# Patient Record
Sex: FEMALE | Race: WHITE | NOT HISPANIC OR LATINO | Employment: FULL TIME | URBAN - METROPOLITAN AREA
[De-identification: names, ages, dates, MRNs, and addresses within clinical notes are randomized per-mention and may not be internally consistent; named-entity substitution may affect disease eponyms.]

---

## 2017-02-15 ENCOUNTER — OFFICE VISIT (OUTPATIENT)
Dept: FAMILY MEDICINE | Facility: CLINIC | Age: 42
End: 2017-02-15
Payer: COMMERCIAL

## 2017-02-15 VITALS
WEIGHT: 149 LBS | HEIGHT: 67 IN | OXYGEN SATURATION: 98 % | TEMPERATURE: 97.4 F | SYSTOLIC BLOOD PRESSURE: 142 MMHG | DIASTOLIC BLOOD PRESSURE: 92 MMHG | BODY MASS INDEX: 23.39 KG/M2

## 2017-02-15 DIAGNOSIS — J32.9 OTHER SINUSITIS: Primary | ICD-10-CM

## 2017-02-15 DIAGNOSIS — J30.1 ALLERGIC RHINITIS DUE TO POLLEN, UNSPECIFIED RHINITIS SEASONALITY: ICD-10-CM

## 2017-02-15 PROCEDURE — 99213 OFFICE O/P EST LOW 20 MIN: CPT | Performed by: NURSE PRACTITIONER

## 2017-02-15 RX ORDER — CEFDINIR 300 MG/1
600 CAPSULE ORAL DAILY
Qty: 14 CAPSULE | Refills: 0 | Status: SHIPPED | OUTPATIENT
Start: 2017-02-15 | End: 2017-02-22

## 2017-02-15 NOTE — PROGRESS NOTES
"  SUBJECTIVE:                                                    Olesya Cintron is a 41 year old female who presents to clinic today for the following health issues:      ENT Symptoms             Symptoms: cc Present Absent Comment   Fever/Chills   x    Fatigue  x     Muscle Aches   x    Eye Irritation   x    Sneezing   x    Nasal Amandeep/Drg  x     Sinus Pressure/Pain  x     Loss of smell  x     Dental pain  x     Sore Throat   x    Swollen Glands  x     Ear Pain/Fullness   x    Cough   x    Wheeze   x    Chest Pain   x    Shortness of breath   x    Rash   x    Other   x      Symptom duration:  two weeks   Symptom severity:  moderate   Treatments tried:  OTC   Contacts:  none     History of asthma/allergies- controlled    -------------------------------------    Problem list and histories reviewed & adjusted, as indicated.  Additional history: as documented    Problem list, Medication list, Allergies, and Medical/Social/Surgical histories reviewed in EPIC and updated as appropriate.    ROS:  Constitutional, HEENT, cardiovascular, pulmonary, GI, , musculoskeletal, neuro, skin, endocrine and psych systems are negative, except as otherwise noted.    OBJECTIVE:                                                    BP (!) 142/92 (BP Location: Left arm, Patient Position: Left side, Cuff Size: Adult Regular)  Temp 97.4  F (36.3  C) (Tympanic)  Ht 5' 6.75\" (1.695 m)  Wt 149 lb (67.6 kg)  SpO2 98%  BMI 23.51 kg/m2  Body mass index is 23.51 kg/(m^2).  GENERAL: healthy, alert and no distress  HENT: normal cephalic/atraumatic, ear canals and TM's normal, nose and mouth without ulcers or lesions, oropharynx clear, oral mucous membranes moist and sinuses: maxillary, frontal, ethmoid tenderness on bilaterally  NECK: no adenopathy, no asymmetry, masses, or scars and thyroid normal to palpation  RESP: lungs clear to auscultation - no rales, rhonchi or wheezes  CV: regular rate and rhythm, normal S1 S2, no S3 or S4, no murmur, click or " rub, no peripheral edema and peripheral pulses strong  MS: no gross musculoskeletal defects noted, no edema    Diagnostic Test Results:  none      ASSESSMENT/PLAN:                                                      1. Other sinusitis    - cefdinir (OMNICEF) 300 MG capsule; Take 2 capsules (600 mg) by mouth daily for 7 days  Dispense: 14 capsule; Refill: 0  Continue to use Neti Pot-  Consider starting flonase.     2. Allergic rhinitis due to pollen, unspecified rhinitis seasonality          FANI Loera [facility]

## 2017-02-15 NOTE — NURSING NOTE
"Initial BP (!) 142/92 (BP Location: Left arm, Patient Position: Left side, Cuff Size: Adult Regular)  Temp 97.4  F (36.3  C) (Tympanic)  Ht 5' 6.75\" (1.695 m)  Wt 149 lb (67.6 kg)  SpO2 98%  BMI 23.51 kg/m2 Estimated body mass index is 23.51 kg/(m^2) as calculated from the following:    Height as of this encounter: 5' 6.75\" (1.695 m).    Weight as of this encounter: 149 lb (67.6 kg). .    Amada Rolle    "

## 2017-02-15 NOTE — PATIENT INSTRUCTIONS
1. Take antibiotics once a day for 7 days  Sinusitis (Antibiotic Treatment)    The sinuses are air-filled spaces within the bones of the face. They connect to the inside of the nose. Sinusitis is an inflammation of the tissue lining the sinus cavity. Sinus inflammation can occur during a cold. It can also be due to allergies to pollens and other particles in the air. Sinusitis can cause symptoms of sinus congestion and fullness. A sinus infection causes fever, headache and facial pain. There is often green or yellow drainage from the nose or into the back of the throat (post-nasal drip). You have been given antibiotics to treat this condition.  Home care:    Take the full course of antibiotics as instructed. Do not stop taking them, even if you feel better.    Drink plenty of water, hot tea, and other liquids. This may help thin mucus. It also may promote sinus drainage.    Heat may help soothe painful areas of the face. Use a towel soaked in hot water. Or,  the shower and direct the hot spray onto your face. Using a vaporizer along with a menthol rub at night may also help.     An expectorant containing guaifenesin may help thin the mucus and promote drainage from the sinuses.    Over-the-counter decongestants may be used unless a similar medicine was prescribed. Nasal sprays work the fastest. Use one that contains phenylephrine or oxymetazoline. First blow the nose gently. Then use the spray. Do not use these medicines more often than directed on the label or symptoms may get worse. You may also use tablets containing pseudoephedrine. Avoid products that combine ingredients, because side effects may be increased. Read labels. You can also ask the pharmacist for help. (NOTE: Persons with high blood pressure should not use decongestants. They can raise blood pressure.)    Over-the-counter antihistamines may help if allergies contributed to your sinusitis.      Do not use nasal rinses or irrigation during an  acute sinus infection, unless told to by your health care provider. Rinsing may spread the infection to other sinuses.    Use acetaminophen or ibuprofen to control pain, unless another pain medicine was prescribed. (If you have chronic liver or kidney disease or ever had a stomach ulcer, talk with your doctor before using these medicines. Aspirin should never be used in anyone under 18 years of age who is ill with a fever. It may cause severe liver damage.)    Don't smoke. This can worsen symptoms.  Follow-up care  Follow up with your healthcare provider or our staff if you are not improving within the next week.  When to seek medical advice  Call your healthcare provider if any of these occur:    Facial pain or headache becoming more severe    Stiff neck    Unusual drowsiness or confusion    Swelling of the forehead or eyelids    Vision problems, including blurred or double vision    Fever of 100.4 F (38 C) or higher, or as directed by your healthcare provider    Seizure    Breathing problems    Symptoms not resolving within 10 days    [id number] [employer] [address] [place] [address], [place] [address]. All rights reserved. This information is not intended as a substitute for professional medical care. Always follow your healthcare professional's instructions.

## 2017-02-15 NOTE — MR AVS SNAPSHOT
After Visit Summary   2/15/2017    Olesya Cintron    MRN: [id number]           Patient Information     Date Of Birth          1975        Visit Information        Provider Department      2/15/2017 11:40 AM Randa Boles APRN [facility]        Today's Diagnoses     Other sinusitis    -  1      Care Instructions    1. Take antibiotics once a day for 7 days  Sinusitis (Antibiotic Treatment)    The sinuses are air-filled spaces within the bones of the face. They connect to the inside of the nose. Sinusitis is an inflammation of the tissue lining the sinus cavity. Sinus inflammation can occur during a cold. It can also be due to allergies to pollens and other particles in the air. Sinusitis can cause symptoms of sinus congestion and fullness. A sinus infection causes fever, headache and facial pain. There is often green or yellow drainage from the nose or into the back of the throat (post-nasal drip). You have been given antibiotics to treat this condition.  Home care:    Take the full course of antibiotics as instructed. Do not stop taking them, even if you feel better.    Drink plenty of water, hot tea, and other liquids. This may help thin mucus. It also may promote sinus drainage.    Heat may help soothe painful areas of the face. Use a towel soaked in hot water. Or,  the shower and direct the hot spray onto your face. Using a vaporizer along with a menthol rub at night may also help.     An expectorant containing guaifenesin may help thin the mucus and promote drainage from the sinuses.    Over-the-counter decongestants may be used unless a similar medicine was prescribed. Nasal sprays work the fastest. Use one that contains phenylephrine or oxymetazoline. First blow the nose gently. Then use the spray. Do not use these medicines more often than directed on the label or symptoms may get worse. You may also use tablets containing pseudoephedrine. Avoid products that  combine ingredients, because side effects may be increased. Read labels. You can also ask the pharmacist for help. (NOTE: Persons with high blood pressure should not use decongestants. They can raise blood pressure.)    Over-the-counter antihistamines may help if allergies contributed to your sinusitis.      Do not use nasal rinses or irrigation during an acute sinus infection, unless told to by your health care provider. Rinsing may spread the infection to other sinuses.    Use acetaminophen or ibuprofen to control pain, unless another pain medicine was prescribed. (If you have chronic liver or kidney disease or ever had a stomach ulcer, talk with your doctor before using these medicines. Aspirin should never be used in anyone under 18 years of age who is ill with a fever. It may cause severe liver damage.)    Don't smoke. This can worsen symptoms.  Follow-up care  Follow up with your healthcare provider or our staff if you are not improving within the next week.  When to seek medical advice  Call your healthcare provider if any of these occur:    Facial pain or headache becoming more severe    Stiff neck    Unusual drowsiness or confusion    Swelling of the forehead or eyelids    Vision problems, including blurred or double vision    Fever of 100.4 F (38 C) or higher, or as directed by your healthcare provider    Seizure    Breathing problems    Symptoms not resolving within 10 days    [id number] [employer]. [address] [place] [address], [place] [address]. All rights reserved. This information is not intended as a substitute for professional medical care. Always follow your healthcare professional's instructions.              Follow-ups after your visit        Who to contact     If you have questions or need follow up information about today's clinic visit or your schedule please contact [facility] directly at [phone].  Normal or non-critical lab and imaging results will be communicated  "to you by Foodcloudhart, letter or phone within 4 business days after the clinic has received the results. If you do not hear from us within 7 days, please contact the clinic through Des[employer]plic[employer] or phone. If you have a critical or abnormal lab result, we will notify you by phone as soon as possible.  Submit refill requests through [employer] or call your pharmacy and they will forward the refill request to us. Please allow 3 business days for your refill to be completed.          Additional Information About Your Visit        [employer]a Information     [employer] gives you secure access to your electronic health record. If you see a primary care provider, you can also send messages to your care team and make appointments. If you have questions, please call your primary care clinic.  If you do not have a primary care provider, please call [phone] and they will assist you.        Care EveryWhere ID     This is your Care EveryWhere ID. This could be used by other organizations to access your [place] medical records  [id number]        Your Vitals Were     Temperature Height Pulse Oximetry BMI (Body Mass Index)          97.4  F (36.3  C) (Tympanic) 5' 6.75\" (1.695 m) 98% 23.51 kg/m2         Blood Pressure from Last 3 Encounters:   02/15/17 (!) 142/92   06/16/16 121/72   03/29/16 128/75    Weight from Last 3 Encounters:   02/15/17 149 lb (67.6 kg)   06/16/16 145 lb (65.8 kg)   03/29/16 141 lb 9.6 oz (64.2 kg)              Today, you had the following     No orders found for display         Today's Medication Changes          These changes are accurate as of: 2/15/17 12:03 PM.  If you have any questions, ask your nurse or doctor.               Start taking these medicines.        Dose/Directions    cefdinir 300 MG capsule   Commonly known as:  OMNICEF   Used for:  Other sinusitis        Dose:  600 mg   Take 2 capsules (600 mg) by mouth daily for 7 days   Quantity:  14 capsule   Refills:  0            Where to get your medicines    "   These medications were sent to [facility] [place], [place] - [address]  [address] [address]     Phone:  [phone]     cefdinir 300 MG capsule                Primary Care Provider Office Phone # Fax #    Bere HarrisTRAMAINE [phone]       [facility] [address] [facility] [address]        Thank you!     Thank you for choosing [facility]  for your care. Our goal is always to provide you with excellent care. Hearing back from our patients is one way we can continue to improve our services. Please take a few minutes to complete the written survey that you may receive in the mail after your visit with us. Thank you!             Your Updated Medication List - Protect others around you: Learn how to safely use, store and throw away your medicines at www.disposemymeds.org.          This list is accurate as of: 2/15/17 12:03 PM.  Always use your most recent med list.                   Brand Name Dispense Instructions for use    * albuterol (2.5 MG/3ML) 0.083% neb solution     30 vial    Take 1 vial (2.5 mg) by nebulization every 6 hours as needed for shortness of breath / dyspnea or wheezing       * albuterol 108 (90 BASE) MCG/ACT Inhaler    PROAIR HFA/PROVENTIL HFA/VENTOLIN HFA    1 Inhaler    Inhale 2 puffs into the lungs every 6 hours as needed for shortness of breath / dyspnea or wheezing       cefdinir 300 MG capsule    OMNICEF    14 capsule    Take 2 capsules (600 mg) by mouth daily for 7 days       * fluticasone 50 MCG/ACT spray    FLONASE    1 g    Spray 1-2 sprays into both nostrils daily       * fluticasone 50 MCG/ACT spray    FLONASE    16 g    Spray 1-2 sprays into both nostrils daily as needed for rhinitis or allergies       ibuprofen 200 MG tablet    ADVIL/MOTRIN     Take 200 mg by mouth every 4 hours as needed for mild pain       methylphenidate 20 MG tablet    RITALIN     Take 20 mg by mouth 2 times daily        norgestimate-ethinyl estradiol 0.25-35 MG-MCG per tablet    ORTHO-CYCLEN, SPRINTEC    84 tablet    Take 1 tablet by mouth daily       order for DME     1 Units    Nebulizer       traZODone 25 MG Tabs half-tab    DESYREL     Take 25 mg by mouth At Bedtime       * Notice:  This list has 4 medication(s) that are the same as other medications prescribed for you. Read the directions carefully, and ask your doctor or other care provider to review them with you.

## 2017-03-06 ENCOUNTER — MYC MEDICAL ADVICE (OUTPATIENT)
Dept: FAMILY MEDICINE | Facility: CLINIC | Age: 42
End: 2017-03-06

## 2017-03-06 ENCOUNTER — TELEPHONE (OUTPATIENT)
Dept: FAMILY MEDICINE | Facility: CLINIC | Age: 42
End: 2017-03-06

## 2017-03-06 ENCOUNTER — HOSPITAL ENCOUNTER (EMERGENCY)
Facility: CLINIC | Age: 42
Discharge: HOME | End: 2017-03-06
Attending: NURSE PRACTITIONER | Admitting: NURSE PRACTITIONER
Payer: COMMERCIAL

## 2017-03-06 VITALS
SYSTOLIC BLOOD PRESSURE: 138 MMHG | BODY MASS INDEX: 22.09 KG/M2 | TEMPERATURE: 98.2 F | DIASTOLIC BLOOD PRESSURE: 87 MMHG | WEIGHT: 140 LBS | HEART RATE: 76 BPM | OXYGEN SATURATION: 98 % | RESPIRATION RATE: 16 BRPM

## 2017-03-06 DIAGNOSIS — J06.9 VIRAL URI: ICD-10-CM

## 2017-03-06 PROCEDURE — 99212 OFFICE O/P EST SF 10 MIN: CPT

## 2017-03-06 PROCEDURE — 99212 OFFICE O/P EST SF 10 MIN: CPT | Performed by: NURSE PRACTITIONER

## 2017-03-06 ASSESSMENT — ENCOUNTER SYMPTOMS
RHINORRHEA: 1
MUSCULOSKELETAL NEGATIVE: 1
RESPIRATORY NEGATIVE: 1
GASTROINTESTINAL NEGATIVE: 1
NEUROLOGICAL NEGATIVE: 1
CONSTITUTIONAL NEGATIVE: 1
SORE THROAT: 1
SINUS PRESSURE: 1
EYES NEGATIVE: 1
CARDIOVASCULAR NEGATIVE: 1

## 2017-03-06 NOTE — TELEPHONE ENCOUNTER
Reason for Call:  Other med request    Detailed comments: patient states she was seen 2/15/17 for a sinus infection and given antibiotics but it feels like the infection is back and she wants more medication.  [facility].    Phone Number Patient can be reached at: Home number on file [phone] (home)    Best Time: any    Can we leave a detailed message on this number? YES    Call taken on 3/6/2017 at 2:01 PM by Charlene Pedraza

## 2017-03-06 NOTE — TELEPHONE ENCOUNTER
Left detailed message , per her ok that we do so, that antibiotics are not usually reordered without a visit/ being seen, or evisit or zipnosis.   I sent her a mychart note also with the dot phrase for evisit sinus.     Deanna Chavez RNC

## 2017-03-06 NOTE — ED AVS SNAPSHOT
[facility]    [address] [address]    Phone:  [phone]    Fax:  [phone]                                       Olesya Cintron   MRN: [id number]    Department:  [facility]   Date of Visit:  3/6/2017           Patient Information     Date Of Birth          1975        Your diagnoses for this visit were:     Viral URI        You were seen by Candido Patel, FANI CNP.      Follow-up Information     Follow up with Bere Harris NP.    Specialty:  Nurse Practitioner - Family    Why:  As needed, If symptoms worsen    Contact information:    [facility]  [address] [address]  [phone]          Discharge Instructions         Viral Upper Respiratory Illness (Adult)  You have a viral upper respiratory illness (URI), which is another term for the common cold. This illness is contagious during the first few days. It is spread through the air by coughing and sneezing. It may also be spread by direct contact (touching the sick person and then touching your own eyes, nose, or mouth). Frequent handwashing will decrease risk of spread. Most viral illnesses go away within 7 to 10 days with rest and simple home remedies. Sometimes the illness may last for several weeks. Antibiotics will not kill a virus, and they are generally not prescribed for this condition.    Home care    If symptoms are severe, rest at home for the first 2 to 3 days. When you resume activity, don't let yourself get too tired.    Avoid being exposed to cigarette smoke (yours or others ).    You may use acetaminophen or ibuprofen to control pain and fever, unless another medicine was prescribed. (Note: If you have chronic liver or kidney disease, have ever had a stomach ulcer or gastrointestinal bleeding, or are taking blood-thinning medicines, talk with your healthcare provider before using these medicines.) Aspirin should never  be given to anyone under 18 years of age who is ill with a viral infection or fever. It may cause severe liver or brain damage.    Your appetite may be poor, so a light diet is fine. Avoid dehydration by drinking 6 to 8 glasses of fluids per day (water, soft drinks, juices, tea, or soup). Extra fluids will help loosen secretions in the nose and lungs.    Over-the-counter cold medicines will not shorten the length of time you re sick, but they may be helpful for the following symptoms: cough, sore throat, and nasal and sinus congestion. (Note: Do not use decongestants if you have high blood pressure.)  Follow-up care  Follow up with your healthcare provider, or as advised.  When to seek medical advice  Call your healthcare provider right away if any of these occur:    Cough with lots of colored sputum (mucus)    Severe headache; face, neck, or ear pain    Difficulty swallowing due to throat pain    Fever of 100.4 F (38 C)  Call 911, or get immediate medical care  Call emergency services right away if any of these occur:    Chest pain, shortness of breath, wheezing, or difficulty breathing    Coughing up blood    Inability to swallow due to throat pain    9763-8457 [employer]. [address] [place] [address]. All rights reserved. This information is not intended as a substitute for professional medical care. Always follow your healthcare professional's instructions.          24 Hour Appointment Hotline       To make an appointment at any [facility], call [phone] ([phone]). If you don't have a family doctor or clinic, we will help you find one. [facility] clinics are conveniently located to serve the needs of you and your family.             Review of your medicines      Our records show that you are taking the medicines listed below. If these are incorrect, please call your family doctor or clinic.        Dose / Directions Last dose taken    * albuterol (2.5 MG/3ML) 0.083% neb  solution   Dose:  1 vial   Quantity:  30 vial        Take 1 vial (2.5 mg) by nebulization every 6 hours as needed for shortness of breath / dyspnea or wheezing   Refills:  1        * albuterol 108 (90 BASE) MCG/ACT Inhaler   Commonly known as:  PROAIR HFA/PROVENTIL HFA/VENTOLIN HFA   Dose:  2 puff   Quantity:  1 Inhaler        Inhale 2 puffs into the lungs every 6 hours as needed for shortness of breath / dyspnea or wheezing   Refills:  0        * fluticasone 50 MCG/ACT spray   Commonly known as:  FLONASE   Dose:  1-2 spray   Quantity:  1 g        Spray 1-2 sprays into both nostrils daily   Refills:  0        * fluticasone 50 MCG/ACT spray   Commonly known as:  FLONASE   Dose:  1-2 spray   Quantity:  16 g        Spray 1-2 sprays into both nostrils daily as needed for rhinitis or allergies   Refills:  1        ibuprofen 200 MG tablet   Commonly known as:  ADVIL/MOTRIN   Dose:  200 mg        Take 200 mg by mouth every 4 hours as needed for mild pain   Refills:  0        methylphenidate 20 MG tablet   Commonly known as:  RITALIN   Dose:  20 mg        Take 20 mg by mouth 2 times daily   Refills:  0        norgestimate-ethinyl estradiol 0.25-35 MG-MCG per tablet   Commonly known as:  ORTHO-CYCLEN, SPRINTEC   Dose:  1 tablet   Quantity:  84 tablet        Take 1 tablet by mouth daily   Refills:  3        order for DME   Quantity:  1 Units        Nebulizer   Refills:  0        traZODone 25 MG Tabs half-tab   Commonly known as:  DESYREL   Dose:  25 mg        Take 25 mg by mouth At Bedtime   Refills:  0        * Notice:  This list has 4 medication(s) that are the same as other medications prescribed for you. Read the directions carefully, and ask your doctor or other care provider to review them with you.            Orders Needing Specimen Collection     None      Pending Results     No orders found from 3/4/2017 to 3/7/2017.            Pending Culture Results     No orders found from 3/4/2017 to 3/7/2017.             Test  Results from your hospital stay            Thank you for choosing [facility]       Thank you for choosing [facility] for your care. Our goal is always to provide you with excellent care. Hearing back from our patients is one way we can continue to improve our services. Please take a few minutes to complete the written survey that you may receive in the mail after you visit with us. Thank you!        [employer] Powerhart Information     BuzzMob gives you secure access to your electronic health record. If you see a primary care provider, you can also send messages to your care team and make appointments. If you have questions, please call your primary care clinic.  If you do not have a primary care provider, please call [phone] and they will assist you.        Care EveryWhere ID     This is your Care EveryWhere ID. This could be used by other organizations to access your [facility] medical records  [id number]        After Visit Summary       This is your record. Keep this with you and show to your community pharmacist(s) and doctor(s) at your next visit.

## 2017-03-06 NOTE — ED AVS SNAPSHOT
[facility]    [address] [address]    Phone:  [phone]    Fax:  [phone]                                       Olesya Cintron   MRN: [id number]    Department:  [facility]   Date of Visit:  3/6/2017           After Visit Summary Signature Page     I have received my discharge instructions, and my questions have been answered. I have discussed any challenges I see with this plan with the nurse or doctor.    ..........................................................................................................................................  Patient/Patient Representative Signature      ..........................................................................................................................................  Patient Representative Print Name and Relationship to Patient    ..................................................               ................................................  Date                                            Time    ..........................................................................................................................................  Reviewed by Signature/Title    ...................................................              ..............................................  Date                                                            Time

## 2017-03-07 NOTE — ED PROVIDER NOTES
History     Chief Complaint   Patient presents with     Sinusitis     seen on 2/15 for sinus infection. Once abx wore off, st, tooth pain, achy.      HPI  Olesya Cintron is a 41 year old female who has had sinus pain for 2 days without fever. Also has nasal congestion and ST. No rash. She had a sinus infection 3 weeks ago and was treated with antibiotics and improved. She takes flonase for chronic rhinitis.     I have reviewed the Medications, Allergies, Past Medical and Surgical History, and Social History in the Epic system.    Review of Systems   Constitutional: Negative.    HENT: Positive for congestion, postnasal drip, rhinorrhea, sinus pressure and sore throat.    Eyes: Negative.    Respiratory: Negative.    Cardiovascular: Negative.    Gastrointestinal: Negative.    Genitourinary: Negative.    Musculoskeletal: Negative.    Skin: Negative.    Neurological: Negative.        Physical Exam   BP: 138/87  Pulse: 76  Temp: 98.2  F (36.8  C)  Resp: 16  Weight: 63.5 kg (140 lb)  SpO2: 98 %  Physical Exam   Constitutional: She is oriented to person, place, and time. She appears well-developed and well-nourished. No distress.   HENT:   Right Ear: External ear normal.   Left Ear: External ear normal.   Mouth/Throat: No oropharyngeal exudate (PND).   Mild Sinus tenderness   Eyes: Conjunctivae and EOM are normal. Right eye exhibits no discharge. Left eye exhibits no discharge.   Neck: Neck supple.   Cardiovascular: Normal rate and normal heart sounds.    No murmur heard.  Pulmonary/Chest: Effort normal and breath sounds normal. No respiratory distress. She has no wheezes. She has no rales. She exhibits no tenderness.   Lymphadenopathy:     She has cervical adenopathy.   Neurological: She is alert and oriented to person, place, and time. No cranial nerve deficit.   Skin: Skin is warm. No rash noted.       ED Course     ED Course     Procedures           Labs Ordered and Resulted from Time of ED Arrival Up to the Time of  Departure from the ED - No data to display    Assessments & Plan (with Medical Decision Making)   Viral uri    I have reviewed the nursing notes.    I have reviewed the findings, diagnosis, plan and need for follow up with the patient.    Discharge Medication List as of 3/6/2017  8:03 PM          Final diagnoses:   Viral URI       3/6/2017   [facility] EMERGENCY DEPARTMENT     Candido Patel APRN CNP  03/06/17 2009

## 2017-03-07 NOTE — DISCHARGE INSTRUCTIONS

## 2017-05-05 DIAGNOSIS — Z30.41 ENCOUNTER FOR SURVEILLANCE OF CONTRACEPTIVE PILLS: ICD-10-CM

## 2017-05-08 RX ORDER — NORGESTIMATE AND ETHINYL ESTRADIOL 0.25-0.035
KIT ORAL
Qty: 84 TABLET | Refills: 3 | Status: SHIPPED | OUTPATIENT
Start: 2017-05-08 | End: 2018-03-17

## 2017-05-08 NOTE — TELEPHONE ENCOUNTER
Mono-Linyan     Last Written Prescription Date: 03/29/16  Last Fill Quantity: 84,  # refills: 3   Last Office Visit with [facility], [facility] or [facility] prescribing provider: 02/15/17                                         Next 5 appointments (look out 90 days)     May 12, 2017  2:20 PM CDT   SHORT with Saman Singh MD   [facility] ([facility])    [address] [address] [phone]

## 2017-05-12 ENCOUNTER — OFFICE VISIT (OUTPATIENT)
Dept: FAMILY MEDICINE | Facility: CLINIC | Age: 42
End: 2017-05-12
Payer: COMMERCIAL

## 2017-05-12 VITALS
WEIGHT: 147.8 LBS | TEMPERATURE: 98.7 F | BODY MASS INDEX: 23.2 KG/M2 | HEIGHT: 67 IN | HEART RATE: 81 BPM | OXYGEN SATURATION: 99 % | SYSTOLIC BLOOD PRESSURE: 110 MMHG | DIASTOLIC BLOOD PRESSURE: 76 MMHG

## 2017-05-12 DIAGNOSIS — N91.2 ABSENCE OF MENSTRUATION: Primary | ICD-10-CM

## 2017-05-12 DIAGNOSIS — R10.31 ABDOMINAL PAIN, RIGHT LOWER QUADRANT: ICD-10-CM

## 2017-05-12 LAB
BETA HCG QUAL IFA URINE: NEGATIVE
FSH SERPL-ACNC: 7.4 IU/L
T4 FREE SERPL-MCNC: 0.88 NG/DL (ref 0.76–1.46)
TSH SERPL DL<=0.05 MIU/L-ACNC: 1.1 MU/L (ref 0.4–4)

## 2017-05-12 PROCEDURE — 36415 COLL VENOUS BLD VENIPUNCTURE: CPT | Performed by: FAMILY MEDICINE

## 2017-05-12 PROCEDURE — 84703 CHORIONIC GONADOTROPIN ASSAY: CPT | Performed by: FAMILY MEDICINE

## 2017-05-12 PROCEDURE — 99213 OFFICE O/P EST LOW 20 MIN: CPT | Performed by: FAMILY MEDICINE

## 2017-05-12 PROCEDURE — 84439 ASSAY OF FREE THYROXINE: CPT | Performed by: FAMILY MEDICINE

## 2017-05-12 PROCEDURE — 83001 ASSAY OF GONADOTROPIN (FSH): CPT | Performed by: FAMILY MEDICINE

## 2017-05-12 PROCEDURE — 84443 ASSAY THYROID STIM HORMONE: CPT | Performed by: FAMILY MEDICINE

## 2017-05-12 RX ORDER — MULTIPLE VITAMINS W/ MINERALS TAB 9MG-400MCG
1 TAB ORAL DAILY
COMMUNITY
End: 2021-01-28

## 2017-05-12 NOTE — PROGRESS NOTES
"  SUBJECTIVE:                                                    Olesya Cintron is a 41 year old female who presents to clinic today for the following health issues:      Concern - amenorrhea     Onset: 2 weeks    Description:   Patient is normally very regular with periods, has not had for last 2 weeks as was scheduled but did start 2 days ago - very light, patient has had ovarian cysts in the past and is wondering if this may be happening again, some pain (currently 4/10), also wondering if may be early onset of menopause     Intensity: mild    Progression of Symptoms:  same    Accompanying Signs & Symptoms:  nausea       Previous history of similar problem:   NA    Precipitating factors:   Worsened by: NA    Alleviating factors:  Improved by: NA       Therapies Tried and outcome: has taken a couple of home pregnancy tests coming back negative      Problem list and histories reviewed & adjusted, as indicated.  Additional history: as documented    Reviewed and updated as needed this visit by clinical staff  Tobacco  Allergies  Med Hx  Surg Hx  Fam Hx  Soc Hx      Reviewed and updated as needed this visit by Provider         ROS:  C: NEGATIVE for fever, chills, change in weight  E/M: NEGATIVE for ear, mouth and throat problems  R: NEGATIVE for significant cough or SOB  CV: NEGATIVE for chest pain, palpitations or peripheral edema  ENDOCRINE: NEGATIVE for temperature intolerance, skin/hair changes    OBJECTIVE:                                                    /76  Pulse 81  Temp 98.7  F (37.1  C) (Tympanic)  Ht 5' 6.75\" (1.695 m)  Wt 147 lb 12.8 oz (67 kg)  LMP 05/10/2017 (Exact Date)  SpO2 99%  BMI 23.32 kg/m2  Body mass index is 23.32 kg/(m^2).  GENERAL: healthy, alert and no distress  ABDOMEN: soft, Right lower quadrant tenderness mild no hepatosplenomegaly, no masses and bowel sounds normal  BACK: no CVA tenderness, no paralumbar tenderness    Diagnostic Test Results:  Results for orders " placed or performed in visit on 05/12/17 (from the past 24 hour(s))   Beta HCG qual IFA urine   Result Value Ref Range    Beta HCG Qual IFA Urine Negative NEG        ASSESSMENT/PLAN:                                                        Olesya was seen today for menopausal sx.    Diagnoses and all orders for this visit:    Absence of menstruation: rule out menopause with FSH and then decide whether to start OCPs again or not  -     Beta HCG qual IFA urine  -     TSH  -     T4 FREE  -     Follicle stimulating hormone    Abdominal pain, right lower quadrant:   -     US Pelvic Complete w Transvaginal; Future        Patient Instructions     To lab for blood work    Call to schedule the ultrasound        Saman Singh MD  [facility]

## 2017-05-12 NOTE — PATIENT INSTRUCTIONS
To lab for blood work    Call to schedule the ultrasound    Thank you for choosing [facility].  You may be receiving a survey in the mail from [employer] regarding your visit today.  Please take a few minutes to complete and return the survey to let us know how we are doing.      If you have questions or concerns, please contact us via SmashChart or you can contact your care team at [phone].    Our Clinic hours are:  Monday 6:40 am  to 7:00 pm  Tuesday -Friday 6:40 am to 5:00 pm    The [place] outpatient lab hours are:  Monday - Friday 6:10 am to 4:45 pm  Saturdays 7:00 am to 11:00 am  Appointments are required, call [phone]    If you have clinical questions after hours or would like to schedule an appointment,  call the clinic at [phone].

## 2017-05-12 NOTE — MR AVS SNAPSHOT
After Visit Summary   5/12/2017    Olesya Cintron    MRN: [id number]           Patient Information     Date Of Birth          1975        Visit Information        Provider Department      5/12/2017 2:20 PM Saman Singh MD [facility]        Today's Diagnoses     Absence of menstruation    -  1    Abdominal pain, right lower quadrant          Care Instructions      To lab for blood work    Call to schedule the ultrasound    Thank you for choosing [facility].  You may be receiving a survey in the mail from [employer] regarding your visit today.  Please take a few minutes to complete and return the survey to let us know how we are doing.      If you have questions or concerns, please contact us via [employer] or you can contact your care team at [phone].    Our Clinic hours are:  Monday 6:40 am  to 7:00 pm  Tuesday -Friday 6:40 am to 5:00 pm    The [place] outpatient lab hours are:  Monday - Friday 6:10 am to 4:45 pm  Saturdays 7:00 am to 11:00 am  Appointments are required, call [phone]    If you have clinical questions after hours or would like to schedule an appointment,  call the clinic at [phone].        Follow-ups after your visit        Future tests that were ordered for you today     Open Future Orders        Priority Expected Expires Ordered    US Pelvic Complete w Transvaginal Routine 8/10/2017 5/12/2018 5/12/2017            Who to contact     If you have questions or need follow up information about today's clinic visit or your schedule please contact [facility] directly at [phone].  Normal or non-critical lab and imaging results will be communicated to you by MyChart, letter or phone within 4 business days after the clinic has received the results. If you do not hear from us within 7 days, please contact the clinic through [employer] or phone. If you have a critical or abnormal lab result, we will notify you by phone as soon as  "possible.  Submit refill requests through [employer] or call your pharmacy and they will forward the refill request to us. Please allow 3 business days for your refill to be completed.          Additional Information About Your Visit        [employer] Information     [employer] gives you secure access to your electronic health record. If you see a primary care provider, you can also send messages to your care team and make appointments. If you have questions, please call your primary care clinic.  If you do not have a primary care provider, please call [phone] and they will assist you.        Care EveryWhere ID     This is your Care EveryWhere ID. This could be used by other organizations to access your [place] medical records  [id number]        Your Vitals Were     Pulse Temperature Height Last Period Pulse Oximetry BMI (Body Mass Index)    81 98.7  F (37.1  C) (Tympanic) 5' 6.75\" (1.695 m) 05/10/2017 (Exact Date) 99% 23.32 kg/m2       Blood Pressure from Last 3 Encounters:   05/12/17 110/76   03/06/17 138/87   02/15/17 (!) 142/92    Weight from Last 3 Encounters:   05/12/17 147 lb 12.8 oz (67 kg)   03/06/17 140 lb (63.5 kg)   02/15/17 149 lb (67.6 kg)              We Performed the Following     Beta HCG qual IFA urine     Follicle stimulating hormone     T4 FREE     TSH        Primary Care Provider Office Phone # Fax #    Bere Harris [phone]       [facility] [address] [address]        Thank you!     Thank you for choosing [facility]  for your care. Our goal is always to provide you with excellent care. Hearing back from our patients is one way we can continue to improve our services. Please take a few minutes to complete the written survey that you may receive in the mail after your visit with us. Thank you!             Your Updated Medication List - Protect others around you: Learn how to safely use, store and throw away your medicines at " www.disposemymeds.org.          This list is accurate as of: 5/12/17  3:39 PM.  Always use your most recent med list.                   Brand Name Dispense Instructions for use    * albuterol (2.5 MG/3ML) 0.083% neb solution     30 vial    Take 1 vial (2.5 mg) by nebulization every 6 hours as needed for shortness of breath / dyspnea or wheezing       * albuterol 108 (90 BASE) MCG/ACT Inhaler    PROAIR HFA/PROVENTIL HFA/VENTOLIN HFA    1 Inhaler    Inhale 2 puffs into the lungs every 6 hours as needed for shortness of breath / dyspnea or wheezing       * fluticasone 50 MCG/ACT spray    FLONASE    1 g    Spray 1-2 sprays into both nostrils daily       * fluticasone 50 MCG/ACT spray    FLONASE    16 g    Spray 1-2 sprays into both nostrils daily as needed for rhinitis or allergies       ibuprofen 200 MG tablet    ADVIL/MOTRIN     Take 200 mg by mouth every 4 hours as needed for mild pain       methylphenidate 20 MG tablet    RITALIN     Take 38 mg by mouth 2 times daily Time released       MONO-LINYAH 0.25-35 MG-MCG per tablet   Generic drug:  norgestimate-ethinyl estradiol     84 tablet    TAKE ONE TABLET BY MOUTH EVERY DAY       Multi-vitamin Tabs tablet      Take 1 tablet by mouth daily       order for DME     1 Units    Nebulizer       traZODone 25 mg Tabs half-tab    DESYREL     Take 25 mg by mouth At Bedtime       VITAMIN D (CHOLECALCIFEROL) PO      Take by mouth daily       * Notice:  This list has 4 medication(s) that are the same as other medications prescribed for you. Read the directions carefully, and ask your doctor or other care provider to review them with you.

## 2017-05-12 NOTE — NURSING NOTE
"Chief Complaint   Patient presents with     Menopausal Sx     patient states has been about 2 weeks late on her period - normally very timely each month; wants to be checked for pregnancy or possible hormonal changes (menopause)       Initial /76  Pulse 81  Temp 98.7  F (37.1  C) (Tympanic)  Ht 5' 6.75\" (1.695 m)  Wt 147 lb 12.8 oz (67 kg)  LMP 05/10/2017 (Exact Date)  SpO2 99%  BMI 23.32 kg/m2 Estimated body mass index is 23.32 kg/(m^2) as calculated from the following:    Height as of this encounter: 5' 6.75\" (1.695 m).    Weight as of this encounter: 147 lb 12.8 oz (67 kg).  Medication Reconciliation: complete  "

## 2017-05-17 ENCOUNTER — HOSPITAL ENCOUNTER (OUTPATIENT)
Dept: ULTRASOUND IMAGING | Facility: CLINIC | Age: 42
Discharge: HOME | End: 2017-05-17
Attending: FAMILY MEDICINE | Admitting: FAMILY MEDICINE
Payer: COMMERCIAL

## 2017-05-17 DIAGNOSIS — R10.31 ABDOMINAL PAIN, RIGHT LOWER QUADRANT: ICD-10-CM

## 2017-05-17 PROCEDURE — 76830 TRANSVAGINAL US NON-OB: CPT

## 2017-10-18 DIAGNOSIS — J01.00 ACUTE MAXILLARY SINUSITIS, RECURRENCE NOT SPECIFIED: ICD-10-CM

## 2017-10-19 RX ORDER — FLUTICASONE PROPIONATE 50 MCG
SPRAY, SUSPENSION (ML) NASAL
Qty: 16 G | Refills: 1 | Status: SHIPPED | OUTPATIENT
Start: 2017-10-19

## 2017-10-19 NOTE — TELEPHONE ENCOUNTER
fluticasone (FLONASE) 50 MCG/ACT nasal spray      Last Written Prescription Date: 9/10/16  Last Fill Quantity: 16g,  # refills: 1   Last Office Visit with [facility], [facility] or [facility] prescribing provider: 5/12/17

## 2017-12-04 ENCOUNTER — TELEPHONE (OUTPATIENT)
Dept: FAMILY MEDICINE | Facility: CLINIC | Age: 42
End: 2017-12-04

## 2017-12-04 DIAGNOSIS — N39.0 RECURRENT URINARY TRACT INFECTION: Primary | ICD-10-CM

## 2017-12-04 NOTE — TELEPHONE ENCOUNTER
Routing refill request to provider for review/approval because:  Drug not on the [facility] refill protocol.   Patient reports she takes this medication after intercourse to prevent UTI.  Patient also reports it has been effective at UTI prevention.    She has been out of this medication for a couple weeks now and can notice a change in her body after intercourse.  RX was prescribed by Dr. Jimenez - note below states this request is to go to Dr. Singh.    macrobid      Last Written Prescription Date: 9-12-16  Last Fill Quantity: 90,  # refills: 1   Last Office Visit with [facility], P or [place] prescribing provider: 5-12-17                                               Mey LARKIN RN

## 2017-12-04 NOTE — TELEPHONE ENCOUNTER
Refill request was sent to the ob clinic but should have gone to Dr. Singh.  Refill for Macrobid 100 MG Caps #90    Send to the [facility] pharmacy    Please advise -    Mahsa Laughlin  [facility] [place]

## 2017-12-05 ENCOUNTER — TELEPHONE (OUTPATIENT)
Dept: FAMILY MEDICINE | Facility: CLINIC | Age: 42
End: 2017-12-05

## 2017-12-05 DIAGNOSIS — R82.90 NONSPECIFIC FINDING ON EXAMINATION OF URINE: Primary | ICD-10-CM

## 2017-12-05 DIAGNOSIS — R30.0 DYSURIA: ICD-10-CM

## 2017-12-05 DIAGNOSIS — R30.0 DYSURIA: Primary | ICD-10-CM

## 2017-12-05 LAB
ALBUMIN UR-MCNC: NEGATIVE MG/DL
APPEARANCE UR: ABNORMAL
BACTERIA #/AREA URNS HPF: ABNORMAL /HPF
BILIRUB UR QL STRIP: NEGATIVE
COLOR UR AUTO: YELLOW
GLUCOSE UR STRIP-MCNC: NEGATIVE MG/DL
HGB UR QL STRIP: ABNORMAL
KETONES UR STRIP-MCNC: NEGATIVE MG/DL
LEUKOCYTE ESTERASE UR QL STRIP: ABNORMAL
NITRATE UR QL: POSITIVE
PH UR STRIP: 6 PH (ref 5–7)
RBC #/AREA URNS AUTO: ABNORMAL /HPF
SOURCE: ABNORMAL
SP GR UR STRIP: 1.01 (ref 1–1.03)
UROBILINOGEN UR STRIP-ACNC: 0.2 EU/DL (ref 0.2–1)
WBC #/AREA URNS AUTO: ABNORMAL /HPF

## 2017-12-05 PROCEDURE — 87186 SC STD MICRODIL/AGAR DIL: CPT | Performed by: FAMILY MEDICINE

## 2017-12-05 PROCEDURE — 87086 URINE CULTURE/COLONY COUNT: CPT | Performed by: FAMILY MEDICINE

## 2017-12-05 PROCEDURE — 87088 URINE BACTERIA CULTURE: CPT | Performed by: FAMILY MEDICINE

## 2017-12-05 PROCEDURE — 81001 URINALYSIS AUTO W/SCOPE: CPT | Performed by: FAMILY MEDICINE

## 2017-12-05 RX ORDER — SULFAMETHOXAZOLE/TRIMETHOPRIM 800-160 MG
1 TABLET ORAL DAILY PRN
Qty: 20 TABLET | Refills: 5 | Status: SHIPPED | OUTPATIENT
Start: 2017-12-05 | End: 2018-05-10

## 2017-12-05 NOTE — TELEPHONE ENCOUNTER
Ok refilled.  ASSESSMENT/PLAN  Olesya was seen today for refill request.    Diagnoses and all orders for this visit:    Recurrent urinary tract infection  -     sulfamethoxazole-trimethoprim (BACTRIM DS/SEPTRA DS) 800-160 MG per tablet; Take 1 tablet by mouth daily as needed After intercourse        Saman Singh MD  [facility]

## 2017-12-06 LAB
BACTERIA SPEC CULT: ABNORMAL
Lab: ABNORMAL
SPECIMEN SOURCE: ABNORMAL

## 2017-12-07 NOTE — TELEPHONE ENCOUNTER
Please call patient.  The urine did come back with infection that should be well treated by the bactrim. She has bactrim at home that she uses post coital to prevent UTI and she was planning to start the bactrim after leaving the urine sample.  Plan to use the Bactrim DS twice daily for 3 days total.    Thanks,  Saman Singh MD

## 2017-12-19 ENCOUNTER — TELEPHONE (OUTPATIENT)
Dept: PEDIATRICS | Facility: CLINIC | Age: 42
End: 2017-12-19

## 2017-12-19 NOTE — TELEPHONE ENCOUNTER
The infection showed that macrobid would not be effective and that the Bactrim would be effective.  So if symptoms still ongoing I would recommend appointment.    Thanks,  Saman Singh MD

## 2017-12-19 NOTE — TELEPHONE ENCOUNTER
Patient called stating 12/5 she dropped off a UA-- (patient was seen with son in clinic) and treated with bactrim,(takes bactrim after intercourse; so has a longterm rx) patient states that she has had not improvement on bactrim (has taken for 4 days vs 3 as requested)   she is requesting a rx for Macrobid.    Pharmacy: [facility] pharmacy       Eloina BAEZ  [facility]

## 2018-01-05 ENCOUNTER — OFFICE VISIT (OUTPATIENT)
Dept: FAMILY MEDICINE | Facility: CLINIC | Age: 43
End: 2018-01-05
Payer: COMMERCIAL

## 2018-01-05 VITALS
BODY MASS INDEX: 24.17 KG/M2 | WEIGHT: 154 LBS | HEIGHT: 67 IN | DIASTOLIC BLOOD PRESSURE: 83 MMHG | SYSTOLIC BLOOD PRESSURE: 116 MMHG | HEART RATE: 75 BPM | TEMPERATURE: 98.5 F

## 2018-01-05 DIAGNOSIS — K82.9 GALLBLADDER ATTACK: ICD-10-CM

## 2018-01-05 DIAGNOSIS — R82.90 NONSPECIFIC FINDING ON EXAMINATION OF URINE: ICD-10-CM

## 2018-01-05 DIAGNOSIS — K64.4 EXTERNAL HEMORRHOIDS: ICD-10-CM

## 2018-01-05 DIAGNOSIS — R81 GLUCOSURIA: ICD-10-CM

## 2018-01-05 DIAGNOSIS — N76.0 VAGINITIS AND VULVOVAGINITIS: Primary | ICD-10-CM

## 2018-01-05 LAB
ALBUMIN SERPL-MCNC: 3.6 G/DL (ref 3.4–5)
ALBUMIN UR-MCNC: ABNORMAL MG/DL
ALP SERPL-CCNC: 61 U/L (ref 40–150)
ALT SERPL W P-5'-P-CCNC: 19 U/L (ref 0–50)
ANION GAP SERPL CALCULATED.3IONS-SCNC: 5 MMOL/L (ref 3–14)
APPEARANCE UR: CLEAR
AST SERPL W P-5'-P-CCNC: 19 U/L (ref 0–45)
BILIRUB SERPL-MCNC: 0.4 MG/DL (ref 0.2–1.3)
BILIRUB UR QL STRIP: NEGATIVE
BUN SERPL-MCNC: 12 MG/DL (ref 7–30)
CALCIUM SERPL-MCNC: 9 MG/DL (ref 8.5–10.1)
CHLORIDE SERPL-SCNC: 101 MMOL/L (ref 94–109)
CO2 SERPL-SCNC: 30 MMOL/L (ref 20–32)
COLOR UR AUTO: ABNORMAL
CREAT SERPL-MCNC: 0.77 MG/DL (ref 0.52–1.04)
GFR SERPL CREATININE-BSD FRML MDRD: 82 ML/MIN/1.7M2
GLUCOSE SERPL-MCNC: 74 MG/DL (ref 70–99)
GLUCOSE UR STRIP-MCNC: 100 MG/DL
HGB UR QL STRIP: ABNORMAL
KETONES UR STRIP-MCNC: NEGATIVE MG/DL
LEUKOCYTE ESTERASE UR QL STRIP: NEGATIVE
NITRATE UR QL: POSITIVE
PH UR STRIP: 5 PH (ref 5–7)
POTASSIUM SERPL-SCNC: 4.1 MMOL/L (ref 3.4–5.3)
PROT SERPL-MCNC: 7.4 G/DL (ref 6.8–8.8)
RBC #/AREA URNS AUTO: NORMAL /HPF
SODIUM SERPL-SCNC: 136 MMOL/L (ref 133–144)
SOURCE: ABNORMAL
SP GR UR STRIP: 1.01 (ref 1–1.03)
SPECIMEN SOURCE: NORMAL
UROBILINOGEN UR STRIP-ACNC: 1 EU/DL (ref 0.2–1)
WBC #/AREA URNS AUTO: NORMAL /HPF
WET PREP SPEC: NORMAL

## 2018-01-05 PROCEDURE — 81001 URINALYSIS AUTO W/SCOPE: CPT | Performed by: FAMILY MEDICINE

## 2018-01-05 PROCEDURE — 87491 CHLMYD TRACH DNA AMP PROBE: CPT | Performed by: FAMILY MEDICINE

## 2018-01-05 PROCEDURE — 87210 SMEAR WET MOUNT SALINE/INK: CPT | Performed by: FAMILY MEDICINE

## 2018-01-05 PROCEDURE — 36415 COLL VENOUS BLD VENIPUNCTURE: CPT | Performed by: FAMILY MEDICINE

## 2018-01-05 PROCEDURE — 87086 URINE CULTURE/COLONY COUNT: CPT | Performed by: FAMILY MEDICINE

## 2018-01-05 PROCEDURE — 80053 COMPREHEN METABOLIC PANEL: CPT | Performed by: FAMILY MEDICINE

## 2018-01-05 PROCEDURE — 87591 N.GONORRHOEAE DNA AMP PROB: CPT | Performed by: FAMILY MEDICINE

## 2018-01-05 PROCEDURE — 99214 OFFICE O/P EST MOD 30 MIN: CPT | Performed by: FAMILY MEDICINE

## 2018-01-05 RX ORDER — LIDOCAINE 50 MG/G
OINTMENT TOPICAL 3 TIMES DAILY PRN
Qty: 30 G | Refills: 0 | Status: SHIPPED | OUTPATIENT
Start: 2018-01-05 | End: 2018-04-06

## 2018-01-05 NOTE — PATIENT INSTRUCTIONS
1. To lab    IBS (irritable bowel syndrome):  This is a bothersome lifelong (chronic) condition that affects the large intestine.  It commonly causes gas, bloating, cramping, diarrhea and constipation.  It is a bothersome, but not serious problem, it does not cause changes to bowel tissue.  It does not increase colon cancer risk.    http://www.[employer].milog/wp-content/uploads/2013/02/[place]-Low-FODMAP-Diet-Handout.pdf  Elimination diet for low FODMAPs (Fermentable Oligo-Di-Monosaccharides and Polyols) to do for 6 weeks, if symptoms improve then add back one food every 3 days.  If no improvement with elimination diet then can go back to regular diet at the end of 6 weeks.  Avoid high gas foods like raw veggies or high fructose corn syrup.  Medications:   -Antispasmodics for diarrhea predominant IBS like Scopolamine or Bentyl or hyoscyamine   -Antidiarrheal medications like Immodium if needed for bouts of diarrhea (but this can worsen constipation)  Over the Counter /Supplements: Several studies have shown that enteric-coated peppermint capsules can help treat symptoms of IBS, including pain, bloating, gas, and diarrhea. (Enteric-coated capsules keep peppermint oil from being released in the stomach, which can cause heartburn and indigestion.) However, a few studies have shown no effect.  Psyllium Fiber like Metamucil have been proven to reduce symptoms for constipation predominant IBS.    Counseling: This can help if stress worsens the IBS symptoms by helping to learn stress relieving techniques.

## 2018-01-05 NOTE — PROGRESS NOTES
Normal electrolyte, liver, and kidney tests.  Blood sugar was very normal.  As discussed other tests normal.    Saman Singh MD  [facility]

## 2018-01-05 NOTE — NURSING NOTE
"Chief Complaint   Patient presents with     Vaginal Problem       Initial /83  Pulse 75  Temp 98.5  F (36.9  C) (Tympanic)  Ht 5' 6.75\" (1.695 m)  Wt 154 lb (69.9 kg)  BMI 24.3 kg/m2 Estimated body mass index is 24.3 kg/(m^2) as calculated from the following:    Height as of this encounter: 5' 6.75\" (1.695 m).    Weight as of this encounter: 154 lb (69.9 kg).  Medication Reconciliation: complete     Diogenes Ross, ASHLEY    "

## 2018-01-05 NOTE — PROGRESS NOTES
"  SUBJECTIVE:   Olesya Cintron is a 42 year old female who presents to clinic today for the following health issues:      Vaginal Symptoms  Onset: 1 month     Description:  Vaginal Discharge: white   Itching (Pruritis): YES, more of hemorrhoids   Burning sensation:  YES  Odor: YES    Accompanying Signs & Symptoms:  Pain with Urination: no   Abdominal Pain: no   Fever: no     History:   Sexually active: YES  New Partner: no   Possibility of Pregnancy:  No    Precipitating factors:   Recent Antibiotic Use: YES     Alleviating factors:  None    Therapies Tried and outcome: Monistat     Mom has gallstones and patient has had intermittent attacks of RUQ pain worst after alcohol.      Hemorrhoids  Onset: 1 month     Description:   Pain: YES  Itching: YES    Accompanying Signs & Symptoms:  Blood streaked toilet paper: YES- in the beginning   Blood in stool: no   Changes in stool pattern: YES    History:   Any previous GI studies done:none  Family History of colon cancer: no     Precipitating factors:   None    Alleviating factors:  None    Therapies Tried and outcome: preparation H and witch hazel pads, peppermint oil  Had the stomach flu which caused diarrhea and brought the hemorrhoids on and then intermittent diarrhea since with certain foods.  Trouble sleeping with the hemorrhoid due to pain and itching. Will sleep with ice pack to get relief.      Problem list and histories reviewed & adjusted, as indicated.  Additional history: as documented    Reviewed and updated as needed this visit by clinical staff     Reviewed and updated as needed this visit by Provider         ROS:  C: NEGATIVE for fever, chills, change in weight  R: NEGATIVE for significant cough or SOB  CV: NEGATIVE for chest pain, palpitations or peripheral edema  : pos for frequency of urination, no pain with urination, no hematuria. No vaginal pain.    OBJECTIVE:     /83  Pulse 75  Temp 98.5  F (36.9  C) (Tympanic)  Ht 5' 6.75\" (1.695 m)  Wt " 154 lb (69.9 kg)  BMI 24.3 kg/m2  Body mass index is 24.3 kg/(m^2).  GENERAL: healthy, alert and no distress  ABDOMEN: soft, mild tender suprapubic, no hepatosplenomegaly, no masses and bowel sounds normal   (female): normal female external genitalia, normal urethral meatus, vaginal mucosa, normal cervix, some white discharge (likely from monistat place 2 nights ago). Between labia majora and perineum the skin is irritation and slightly cracking in the fold.  RECTAL (female): normal sphincter tone, no rectal masses, few skin tags, no bleeding hemorrhoids or anal fissure.    Diagnostic Test Results:  Results for orders placed or performed in visit on 01/05/18 (from the past 24 hour(s))   Wet prep   Result Value Ref Range    Specimen Description Vagina     Wet Prep No yeast seen     Wet Prep No clue cells seen     Wet Prep No Trichomonas seen    *UA reflex to Microscopic and Culture ([place] and [facility] (except [place] and [place])   Result Value Ref Range    Color Urine Orange     Appearance Urine Clear     Glucose Urine 100 (A) NEG^Negative mg/dL    Bilirubin Urine Negative NEG^Negative    Ketones Urine Negative NEG^Negative mg/dL    Specific Gravity Urine 1.015 1.003 - 1.035    Blood Urine Trace (A) NEG^Negative    pH Urine 5.0 5.0 - 7.0 pH    Protein Albumin Urine Trace (A) NEG^Negative mg/dL    Urobilinogen Urine 1.0 0.2 - 1.0 EU/dL    Nitrite Urine Positive (A) NEG^Negative    Leukocyte Esterase Urine Negative NEG^Negative    Source Midstream Urine    Urine Microscopic   Result Value Ref Range    WBC Urine O - 2 OTO2^O - 2 /HPF    RBC Urine O - 2 OTO2^O - 2 /HPF       ASSESSMENT/PLAN:       Olesya was seen today for vaginal problem.    Diagnoses and all orders for this visit:    Vaginitis and vulvovaginitis: skin barrier like aquafor or vasline to irritated area and can use the preperation H there too. Likely the vestibulitis as she has had in the past so again try lidocaine  -     Wet prep  -      *UA reflex to Microscopic and Culture ([place] and [facility] (except [place] and [place])  -     Chlamydia trachomatis PCR  -     Neisseria gonorrhoeae PCR  -     Urine Microscopic  -     lidocaine (XYLOCAINE) 5 % ointment; Apply topically 3 times daily as needed for moderate pain    Nonspecific finding on examination of urine  -     Urine Culture Aerobic Bacterial    Glucosuria  -     Comprehensive metabolic panel    External hemorrhoids: continue usual cares.    Gallbladder attacks: plan US    See Patient Instructions    Saman Singh MD  [facility]

## 2018-01-05 NOTE — LETTER
January 5, 2018      Olesya Cintron  [address] [facility]  [place] [address]        Dear ,    We are writing to inform you of your test results.    Normal electrolyte, liver, and kidney tests.   Blood sugar was very normal.   As discussed other tests normal.    Resulted Orders   Wet prep   Result Value Ref Range    Specimen Description Vagina     Wet Prep No yeast seen     Wet Prep No clue cells seen     Wet Prep No Trichomonas seen    *UA reflex to Microscopic and Culture ([place] and [facility] (except [place] and [place])   Result Value Ref Range    Color Urine Orange     Appearance Urine Clear     Glucose Urine 100 (A) NEG^Negative mg/dL    Bilirubin Urine Negative NEG^Negative    Ketones Urine Negative NEG^Negative mg/dL    Specific Gravity Urine 1.015 1.003 - 1.035    Blood Urine Trace (A) NEG^Negative    pH Urine 5.0 5.0 - 7.0 pH    Protein Albumin Urine Trace (A) NEG^Negative mg/dL    Urobilinogen Urine 1.0 0.2 - 1.0 EU/dL    Nitrite Urine Positive (A) NEG^Negative    Leukocyte Esterase Urine Negative NEG^Negative    Source Midstream Urine    Urine Microscopic   Result Value Ref Range    WBC Urine O - 2 OTO2^O - 2 /HPF    RBC Urine O - 2 OTO2^O - 2 /HPF   Comprehensive metabolic panel   Result Value Ref Range    Sodium 136 133 - 144 mmol/L    Potassium 4.1 3.4 - 5.3 mmol/L    Chloride 101 94 - 109 mmol/L    Carbon Dioxide 30 20 - 32 mmol/L    Anion Gap 5 3 - 14 mmol/L    Glucose 74 70 - 99 mg/dL      Comment:      Non Fasting    Urea Nitrogen 12 7 - 30 mg/dL    Creatinine 0.77 0.52 - 1.04 mg/dL    GFR Estimate 82 >60 mL/min/1.7m2      Comment:      Non  GFR Calc    GFR Estimate If Black >90 >60 mL/min/1.7m2      Comment:       GFR Calc    Calcium 9.0 8.5 - 10.1 mg/dL    Bilirubin Total 0.4 0.2 - 1.3 mg/dL    Albumin 3.6 3.4 - 5.0 g/dL    Protein Total 7.4 6.8 - 8.8 g/dL    Alkaline Phosphatase 61 40 - 150 U/L    ALT 19 0 - 50 U/L    AST 19 0 - 45 U/L       If you  have any questions or concerns, please call the clinic at the number listed above.       Sincerely,        Saman Singh MD/iban

## 2018-01-05 NOTE — MR AVS SNAPSHOT
After Visit Summary   1/5/2018    Olesya Cintron    MRN: [id number]           Patient Information     Date Of Birth          1975        Visit Information        Provider Department      1/5/2018 8:00 AM Saman Singh MD [facility]        Today's Diagnoses     Vaginitis and vulvovaginitis    -  1    Nonspecific finding on examination of urine        Glucosuria          Care Instructions    1. To lab    IBS (irritable bowel syndrome):  This is a bothersome lifelong (chronic) condition that affects the large intestine.  It commonly causes gas, bloating, cramping, diarrhea and constipation.  It is a bothersome, but not serious problem, it does not cause changes to bowel tissue.  It does not increase colon cancer risk.    http://www.[employer]/wp-content/uploads/2013/02/[place]-Low-FODMAP-Diet-Handout.pdf  Elimination diet for low FODMAPs (Fermentable Oligo-Di-Monosaccharides and Polyols) to do for 6 weeks, if symptoms improve then add back one food every 3 days.  If no improvement with elimination diet then can go back to regular diet at the end of 6 weeks.  Avoid high gas foods like raw veggies or high fructose corn syrup.  Medications:   -Antispasmodics for diarrhea predominant IBS like Scopolamine or Bentyl or hyoscyamine   -Antidiarrheal medications like Immodium if needed for bouts of diarrhea (but this can worsen constipation)  Over the Counter /Supplements: Several studies have shown that enteric-coated peppermint capsules can help treat symptoms of IBS, including pain, bloating, gas, and diarrhea. (Enteric-coated capsules keep peppermint oil from being released in the stomach, which can cause heartburn and indigestion.) However, a few studies have shown no effect.  Psyllium Fiber like Metamucil have been proven to reduce symptoms for constipation predominant IBS.    Counseling: This can help if stress worsens the IBS symptoms by helping to learn stress  "relieving techniques.              Follow-ups after your visit        Who to contact     If you have questions or need follow up information about today's clinic visit or your schedule please contact [facility] directly at [phone].  Normal or non-critical lab and imaging results will be communicated to you by MyChart, letter or phone within 4 business days after the clinic has received the results. If you do not hear from us within 7 days, please contact the clinic through e[employer] Solar[employer] or phone. If you have a critical or abnormal lab result, we will notify you by phone as soon as possible.  Submit refill requests through One[employer] or call your pharmacy and they will forward the refill request to us. Please allow 3 business days for your refill to be completed.          Additional Information About Your Visit        ePod Solar[employer] Information     OneShift gives you secure access to your electronic health record. If you see a primary care provider, you can also send messages to your care team and make appointments. If you have questions, please call your primary care clinic.  If you do not have a primary care provider, please call [phone] and they will assist you.        Care EveryWhere ID     This is your Care EveryWhere ID. This could be used by other organizations to access your [place] medical records  [id number]        Your Vitals Were     Pulse Temperature Height BMI (Body Mass Index)          75 98.5  F (36.9  C) (Tympanic) 5' 6.75\" (1.695 m) 24.3 kg/m2         Blood Pressure from Last 3 Encounters:   01/05/18 116/83   05/12/17 110/76   03/06/17 138/87    Weight from Last 3 Encounters:   01/05/18 154 lb (69.9 kg)   05/12/17 147 lb 12.8 oz (67 kg)   03/06/17 140 lb (63.5 kg)              We Performed the Following     *UA reflex to Microscopic and Culture ([place] and [facility] (except [place] and [place])     Chlamydia trachomatis PCR     Comprehensive metabolic panel     Neisseria " gonorrhoeae PCR     Urine Culture Aerobic Bacterial     Urine Microscopic     Wet prep        Primary Care Provider Office Phone # Fax #    Bere Harris, [place] [phone] [address] [facility] [address]        Equal Access to Services     LUCINA MCMAHAN : Hadii jaja bauer hadeloyo Soomaali, waaxda luqadaha, qaybta kaalmada adeegyada, makenzie janellein hayren macieirvin blanca bessy hussein. So [facility] [phone].    ATENCIÓN: Si habla español, tiene a doyle disposición servicios gratuitos de asistencia lingüística. Llame al [phone].    We comply with applicable federal civil rights laws and [place] laws. We do not discriminate on the basis of race, color, national origin, age, disability, sex, sexual orientation, or gender identity.            Thank you!     Thank you for choosing [facility]  for your care. Our goal is always to provide you with excellent care. Hearing back from our patients is one way we can continue to improve our services. Please take a few minutes to complete the written survey that you may receive in the mail after your visit with us. Thank you!             Your Updated Medication List - Protect others around you: Learn how to safely use, store and throw away your medicines at www.disposemymeds.org.          This list is accurate as of: 1/5/18  9:16 AM.  Always use your most recent med list.                   Brand Name Dispense Instructions for use Diagnosis    * albuterol (2.5 MG/3ML) 0.083% neb solution     30 vial    Take 1 vial (2.5 mg) by nebulization every 6 hours as needed for shortness of breath / dyspnea or wheezing    Acute bronchitis, unspecified organism       * albuterol 108 (90 BASE) MCG/ACT Inhaler    PROAIR HFA/PROVENTIL HFA/VENTOLIN HFA    1 Inhaler    Inhale 2 puffs into the lungs every 6 hours as needed for shortness of breath / dyspnea or wheezing    Acute bronchitis, unspecified organism       * fluticasone 50 MCG/ACT spray    FLONASE    1 g     Spray 1-2 sprays into both nostrils daily    Acute recurrent maxillary sinusitis       * fluticasone 50 MCG/ACT spray    FLONASE    16 g    USE ONE TO TWO SPRAYS IN BOTH NOSTRILS ONCE DAILY AS NEEDED FOR RHINITIS OR ALLERGIES    Acute maxillary sinusitis, recurrence not specified       ibuprofen 200 MG tablet    ADVIL/MOTRIN     Take 200 mg by mouth every 4 hours as needed for mild pain        methylphenidate 20 MG tablet    RITALIN     Take 38 mg by mouth 2 times daily Time released        MONO-LINYAH 0.25-35 MG-MCG per tablet   Generic drug:  norgestimate-ethinyl estradiol     84 tablet    TAKE ONE TABLET BY MOUTH EVERY DAY    Encounter for surveillance of contraceptive pills       Multi-vitamin Tabs tablet      Take 1 tablet by mouth daily        order for DME     1 Units    Nebulizer    Acute bronchitis, unspecified organism       sulfamethoxazole-trimethoprim 800-160 MG per tablet    BACTRIM DS/SEPTRA DS    20 tablet    Take 1 tablet by mouth daily as needed After intercourse    Recurrent urinary tract infection       traZODone 25 mg Tabs half-tab    DESYREL     Take 25 mg by mouth At Bedtime        VITAMIN D (CHOLECALCIFEROL) PO      Take by mouth daily        * Notice:  This list has 4 medication(s) that are the same as other medications prescribed for you. Read the directions carefully, and ask your doctor or other care provider to review them with you.

## 2018-01-06 LAB
BACTERIA SPEC CULT: NO GROWTH
Lab: NORMAL
SPECIMEN SOURCE: NORMAL

## 2018-01-08 LAB
C TRACH DNA SPEC QL NAA+PROBE: NEGATIVE
N GONORRHOEA DNA SPEC QL NAA+PROBE: NEGATIVE
SPECIMEN SOURCE: NORMAL
SPECIMEN SOURCE: NORMAL

## 2018-03-17 ENCOUNTER — MYC MEDICAL ADVICE (OUTPATIENT)
Dept: FAMILY MEDICINE | Facility: CLINIC | Age: 43
End: 2018-03-17

## 2018-03-17 DIAGNOSIS — Z30.41 ENCOUNTER FOR SURVEILLANCE OF CONTRACEPTIVE PILLS: ICD-10-CM

## 2018-03-19 RX ORDER — NORGESTIMATE AND ETHINYL ESTRADIOL 0.25-0.035
KIT ORAL
Qty: 84 TABLET | Refills: 0 | Status: SHIPPED | OUTPATIENT
Start: 2018-03-19 | End: 2018-05-10

## 2018-03-19 NOTE — TELEPHONE ENCOUNTER
"Requested Prescriptions   Pending Prescriptions Disp Refills     MONO-LINYAH 0.25-35 MG-MCG per tablet [Pharmacy Med Name: MONO-LINYAH 0.25-35 MG-MCG TABS] 84 tablet 3     Sig: TAKE ONE TABLET BY MOUTH EVERY DAY    Contraceptives Protocol Passed    3/17/2018 10:41 AM       Passed - Patient is not a current smoker if age is 35 or older       Passed - Recent (12 mo) or future (30 days) visit within the authorizing provider's specialty    Patient had office visit in the last 12 months or has a visit in the next 30 days with authorizing provider or within the authorizing provider's specialty.  See \"Patient Info\" tab in inbasket, or \"Choose Columns\" in Meds & Orders section of the refill encounter.           Passed - No active pregnancy on record       Passed - No positive pregnancy test in past 12 months        Prescription approved per [facility] Refill Protocol.  Deanna Chavez RNC    "

## 2018-04-05 ENCOUNTER — TRANSFERRED RECORDS (OUTPATIENT)
Dept: HEALTH INFORMATION MANAGEMENT | Facility: CLINIC | Age: 43
End: 2018-04-05
Payer: COMMERCIAL

## 2018-04-06 ENCOUNTER — OFFICE VISIT (OUTPATIENT)
Dept: FAMILY MEDICINE | Facility: CLINIC | Age: 43
End: 2018-04-06
Payer: COMMERCIAL

## 2018-04-06 VITALS
BODY MASS INDEX: 23.1 KG/M2 | HEART RATE: 90 BPM | WEIGHT: 147.2 LBS | SYSTOLIC BLOOD PRESSURE: 117 MMHG | OXYGEN SATURATION: 99 % | DIASTOLIC BLOOD PRESSURE: 78 MMHG | HEIGHT: 67 IN | TEMPERATURE: 97.8 F

## 2018-04-06 DIAGNOSIS — R03.0 ELEVATED BLOOD PRESSURE READING WITHOUT DIAGNOSIS OF HYPERTENSION: Primary | ICD-10-CM

## 2018-04-06 PROCEDURE — 99213 OFFICE O/P EST LOW 20 MIN: CPT | Performed by: FAMILY MEDICINE

## 2018-04-06 NOTE — MR AVS SNAPSHOT
After Visit Summary   4/6/2018    Olesya Cintron    MRN: [id number]           Patient Information     Date Of Birth          1975        Visit Information        Provider Department      4/6/2018 8:40 AM Saman Singh MD [facility]        Today's Diagnoses     Elevated blood pressure reading without diagnosis of hypertension    -  1      Care Instructions          Thank you for choosing [facility].  You may be receiving a survey in the mail from [facility] regarding your visit today.  Please take a few minutes to complete and return the survey to let us know how we are doing.      If you have questions or concerns, please contact us via [place] [employer] or you can contact your care team at [phone].    Our Clinic hours are:  Monday 6:40 am  to 7:00 pm  Tuesday -Friday 6:40 am to 5:00 pm    The [place] outpatient lab hours are:  Monday - Friday 6:10 am to 4:45 pm  Saturdays 7:00 am to 11:00 am  Appointments are required, call [phone]    If you have clinical questions after hours or would like to schedule an appointment,  call the clinic at [phone].          Follow-ups after your visit        Your next 10 appointments already scheduled     May 10, 2018  2:20 PM CDT   PHYSICAL with Saman Singh MD   [facility]    [address] [facility] [address] [phone]              Who to contact     If you have questions or need follow up information about today's clinic visit or your schedule please contact [facility] directly at [phone].  Normal or non-critical lab and imaging results will be communicated to you by MyChart, letter or phone within 4 business days after the clinic has received the results. If you do not hear from us within 7 days, please contact the clinic through MyChart or phone. If you have a critical or abnormal lab result, we will notify you by phone as soon as  "possible.  Submit refill requests through [employer] or call your pharmacy and they will forward the refill request to us. Please allow 3 business days for your refill to be completed.          Additional Information About Your Visit        Klipfoliohart Information     [employer] gives you secure access to your electronic health record. If you see a primary care provider, you can also send messages to your care team and make appointments. If you have questions, please call your primary care clinic.  If you do not have a primary care provider, please call [phone] and they will assist you.        Care EveryWhere ID     This is your Care EveryWhere ID. This could be used by other organizations to access your [place] medical records  [id number]        Your Vitals Were     Pulse Temperature Height Last Period Pulse Oximetry BMI (Body Mass Index)    90 97.8  F (36.6  C) (Tympanic) 5' 6.75\" (1.695 m) 04/05/2018 (Exact Date) 99% 23.23 kg/m2       Blood Pressure from Last 3 Encounters:   04/06/18 117/78   01/05/18 116/83   05/12/17 110/76    Weight from Last 3 Encounters:   04/06/18 147 lb 3.2 oz (66.8 kg)   01/05/18 154 lb (69.9 kg)   05/12/17 147 lb 12.8 oz (67 kg)              Today, you had the following     No orders found for display       Primary Care Provider Office Phone # Fax #    Bere Chen Harris [phone] [address] [facility] [address]        Equal Access to Services     DESIRE MCMAHAN : Hadii aad ku hadasho Soomaali, waaxda luqadaha, qaybta kaalmada adeegyada, makenzie hussein. So [facility] [phone].    ATENCIÓN: Si habla sammieañol, tiene a doyle disposición servicios gratuitos de asistencia lingüística. Llame al [phone].    We comply with applicable federal civil rights laws and [place] laws. We do not discriminate on the basis of race, color, national origin, age, disability, sex, sexual orientation, or gender identity.            Thank you!     Thank " you for choosing [facility]  for your care. Our goal is always to provide you with excellent care. Hearing back from our patients is one way we can continue to improve our services. Please take a few minutes to complete the written survey that you may receive in the mail after your visit with us. Thank you!             Your Updated Medication List - Protect others around you: Learn how to safely use, store and throw away your medicines at www.disposemymeds.org.          This list is accurate as of 4/6/18  9:17 AM.  Always use your most recent med list.                   Brand Name Dispense Instructions for use Diagnosis    * albuterol (2.5 MG/3ML) 0.083% neb solution     30 vial    Take 1 vial (2.5 mg) by nebulization every 6 hours as needed for shortness of breath / dyspnea or wheezing    Acute bronchitis, unspecified organism       * albuterol 108 (90 BASE) MCG/ACT Inhaler    PROAIR HFA/PROVENTIL HFA/VENTOLIN HFA    1 Inhaler    Inhale 2 puffs into the lungs every 6 hours as needed for shortness of breath / dyspnea or wheezing    Acute bronchitis, unspecified organism       fluticasone 50 MCG/ACT spray    FLONASE    16 g    USE ONE TO TWO SPRAYS IN BOTH NOSTRILS ONCE DAILY AS NEEDED FOR RHINITIS OR ALLERGIES    Acute maxillary sinusitis, recurrence not specified       ibuprofen 200 MG tablet    ADVIL/MOTRIN     Take 200 mg by mouth every 4 hours as needed for mild pain        methylphenidate 20 MG tablet    RITALIN     Take 38 mg by mouth 2 times daily Time released        MONO-LINYAH 0.25-35 MG-MCG per tablet   Generic drug:  norgestimate-ethinyl estradiol     84 tablet    TAKE ONE TABLET BY MOUTH EVERY DAY    Encounter for surveillance of contraceptive pills       Multi-vitamin Tabs tablet      Take 1 tablet by mouth daily        order for DME     1 Units    Nebulizer    Acute bronchitis, unspecified organism       sulfamethoxazole-trimethoprim 800-160 MG per tablet    BACTRIM DS/SEPTRA DS    20  tablet    Take 1 tablet by mouth daily as needed After intercourse    Recurrent urinary tract infection       traZODone 25 mg Tabs half-tab    DESYREL     Take 50 mg by mouth At Bedtime 1 to 1 1/2 tablets at bedtime        UNABLE TO FIND      MEDICATION NAME: Mydayis 37.5 mg daily        VITAMIN D (CHOLECALCIFEROL) PO      Take by mouth daily        * Notice:  This list has 2 medication(s) that are the same as other medications prescribed for you. Read the directions carefully, and ask your doctor or other care provider to review them with you.

## 2018-04-06 NOTE — LETTER
[facility]  [address] [facility] [address] [address]  Phone: [phone]    April 6, 2018        Olesya Cintron  [address] [place] [address]        Attn Jeremías Michel PA-C:  Fax [phone]      RE: Olesya Dacosta in as established patient of mine and was seen and treated today at our clinic.  Blood pressures here have all been well within goal.  Today's at 117/78.  She is not needing any blood pressure medication and blood pressure are in goal.      BP Readings from Last 3 Encounters:   04/06/18 117/78   01/05/18 116/83   05/12/17 110/76    Wt Readings from Last 3 Encounters:   04/06/18 147 lb 3.2 oz (66.8 kg)   01/05/18 154 lb (69.9 kg)   05/12/17 147 lb 12.8 oz (67 kg)        Please contact me for questions or concerns.      Sincerely,        Saman Singh MD

## 2018-04-06 NOTE — PATIENT INSTRUCTIONS
Thank you for choosing [facility].  You may be receiving a survey in the mail from Missy Alas regarding your visit today.  Please take a few minutes to complete and return the survey to let us know how we are doing.      If you have questions or concerns, please contact us via Circle or you can contact your care team at [phone].    Our Clinic hours are:  Monday 6:40 am  to 7:00 pm  Tuesday -Friday 6:40 am to 5:00 pm    The [place] outpatient lab hours are:  Monday - Friday 6:10 am to 4:45 pm  Saturdays 7:00 am to 11:00 am  Appointments are required, call [phone]    If you have clinical questions after hours or would like to schedule an appointment,  call the clinic at [phone].

## 2018-04-06 NOTE — NURSING NOTE
"Chief Complaint   Patient presents with     Hypertension     patient was recently seen at [facility] to get a med refill; while there she had elevated b/p and was not able to get the script filled        Initial /78  Pulse 90  Temp 97.8  F (36.6  C) (Tympanic)  Ht 5' 6.75\" (1.695 m)  Wt 147 lb 3.2 oz (66.8 kg)  LMP 04/05/2018 (Exact Date)  SpO2 99%  BMI 23.23 kg/m2 Estimated body mass index is 23.23 kg/(m^2) as calculated from the following:    Height as of this encounter: 5' 6.75\" (1.695 m).    Weight as of this encounter: 147 lb 3.2 oz (66.8 kg).  Medication Reconciliation: complete  "

## 2018-04-06 NOTE — PROGRESS NOTES
"  SUBJECTIVE:   Olesya Cintron is a 42 year old female who presents to clinic today for the following health issues:      Chief Complaint   Patient presents with     Hypertension     patient was recently seen at [facility] to get a med refill; while there she had elevated b/p and was not able to get the script filled      That day BPs 171/95 then 165/99 then 147/98 15 min later this was after a full psych assessment and a bad day at work.  Psychiatry PA is asking for medical clearance for BP prior to prescribing methylphenidate.    Dad had HTN and quintuple bypass.   Mom has HTN.       Problem list and histories reviewed & adjusted, as indicated.  Additional history: as documented    BP Readings from Last 3 Encounters:   04/06/18 117/78   01/05/18 116/83   05/12/17 110/76    Wt Readings from Last 3 Encounters:   04/06/18 147 lb 3.2 oz (66.8 kg)   01/05/18 154 lb (69.9 kg)   05/12/17 147 lb 12.8 oz (67 kg)                    Reviewed and updated as needed this visit by clinical staff  Tobacco  Allergies  Meds       Reviewed and updated as needed this visit by Provider         ROS:  CONSTITUTIONAL: NEGATIVE for fever, chills, change in weight  RESP: NEGATIVE for significant cough or SOB  CV: NEGATIVE for chest pain, palpitations or peripheral edema    OBJECTIVE:     /78  Pulse 90  Temp 97.8  F (36.6  C) (Tympanic)  Ht 5' 6.75\" (1.695 m)  Wt 147 lb 3.2 oz (66.8 kg)  LMP 04/05/2018 (Exact Date)  SpO2 99%  BMI 23.23 kg/m2  Body mass index is 23.23 kg/(m^2).  GENERAL: healthy, alert and no distress  CV: regular rate and rhythm, normal S1 S2, no S3 or S4, no murmur, click or rub, no peripheral edema and peripheral pulses strong    Diagnostic Test Results:  none     ASSESSMENT/PLAN:       Olesya was seen today for hypertension.    Diagnoses and all orders for this visit:    Elevated blood pressure reading without diagnosis of hypertension: at psychiatry office after bad day  -here BPs all in " goal.  Will fax the letter for BP goal to psychiatrist.        Saman Singh MD  [facility]

## 2018-04-27 ENCOUNTER — HOSPITAL ENCOUNTER (OUTPATIENT)
Dept: ULTRASOUND IMAGING | Facility: CLINIC | Age: 43
Discharge: HOME | End: 2018-04-27
Attending: FAMILY MEDICINE | Admitting: FAMILY MEDICINE
Payer: COMMERCIAL

## 2018-04-27 DIAGNOSIS — K82.9 GALLBLADDER ATTACK: ICD-10-CM

## 2018-04-27 PROCEDURE — 76700 US EXAM ABDOM COMPLETE: CPT

## 2018-05-01 ENCOUNTER — MYC MEDICAL ADVICE (OUTPATIENT)
Dept: FAMILY MEDICINE | Facility: CLINIC | Age: 43
End: 2018-05-01

## 2018-05-01 DIAGNOSIS — K29.70 GASTRITIS, PRESENCE OF BLEEDING UNSPECIFIED, UNSPECIFIED CHRONICITY, UNSPECIFIED GASTRITIS TYPE: Primary | ICD-10-CM

## 2018-05-10 ENCOUNTER — OFFICE VISIT (OUTPATIENT)
Dept: FAMILY MEDICINE | Facility: CLINIC | Age: 43
End: 2018-05-10
Payer: COMMERCIAL

## 2018-05-10 VITALS
HEIGHT: 67 IN | DIASTOLIC BLOOD PRESSURE: 83 MMHG | HEART RATE: 93 BPM | TEMPERATURE: 98.5 F | BODY MASS INDEX: 22.98 KG/M2 | WEIGHT: 146.4 LBS | OXYGEN SATURATION: 100 % | SYSTOLIC BLOOD PRESSURE: 128 MMHG

## 2018-05-10 DIAGNOSIS — N39.0 RECURRENT URINARY TRACT INFECTION: ICD-10-CM

## 2018-05-10 DIAGNOSIS — Z01.419 WELL WOMAN EXAM WITH ROUTINE GYNECOLOGICAL EXAM: Primary | ICD-10-CM

## 2018-05-10 DIAGNOSIS — Z30.41 ENCOUNTER FOR SURVEILLANCE OF CONTRACEPTIVE PILLS: ICD-10-CM

## 2018-05-10 DIAGNOSIS — J20.9 ACUTE BRONCHITIS, UNSPECIFIED ORGANISM: ICD-10-CM

## 2018-05-10 DIAGNOSIS — K21.9 GASTROESOPHAGEAL REFLUX DISEASE WITHOUT ESOPHAGITIS: ICD-10-CM

## 2018-05-10 PROCEDURE — 99396 PREV VISIT EST AGE 40-64: CPT | Performed by: FAMILY MEDICINE

## 2018-05-10 RX ORDER — ALBUTEROL SULFATE 90 UG/1
2 AEROSOL, METERED RESPIRATORY (INHALATION) EVERY 6 HOURS PRN
Qty: 1 INHALER | Refills: 3 | Status: SHIPPED | OUTPATIENT
Start: 2018-05-10 | End: 2021-01-28

## 2018-05-10 RX ORDER — SULFAMETHOXAZOLE/TRIMETHOPRIM 800-160 MG
1 TABLET ORAL DAILY PRN
Qty: 20 TABLET | Refills: 5 | Status: SHIPPED | OUTPATIENT
Start: 2018-05-10 | End: 2019-03-13

## 2018-05-10 RX ORDER — NORGESTIMATE AND ETHINYL ESTRADIOL 0.25-0.035
1 KIT ORAL DAILY
Qty: 84 TABLET | Refills: 4 | Status: SHIPPED | OUTPATIENT
Start: 2018-05-10 | End: 2019-06-21

## 2018-05-10 NOTE — PATIENT INSTRUCTIONS
Reflux: continue omeprazole daily for few months to a year  After that you can step down to zantac twice daily.    Plan fasting labs for cholesterol and glucose when it works for you, schedule.    Preventive Health Recommendations  Female Ages 40 to 49    Yearly exam:     See your health care provider every year in order to  1. Review health changes.   2. Discuss preventive care.    3. Review your medicines if your doctor prescribed any.      Get a Pap test every three years (unless you have an abnormal result and your provider advises testing more often).      If you get Pap tests with HPV test, you only need to test every 5 years, unless you have an abnormal result. You do not need a Pap test if your uterus was removed (hysterectomy) and you have not had cancer.      You should be tested each year for STDs (sexually transmitted diseases), if you're at risk.       Ask your doctor if you should have a mammogram. [phone]      Have a colonoscopy (test for colon cancer) if someone in your family has had colon cancer or polyps before age 50.       Have a cholesterol test every 5 years.       Have a diabetes test (fasting glucose) after age 45. If you are at risk for diabetes, you should have this test every 3 years.    Shots: Get a flu shot each year. Get a tetanus shot every 10 years.     Nutrition:     Eat at least 5 servings of fruits and vegetables each day.    Eat whole-grain bread, whole-wheat pasta and brown rice instead of white grains and rice.    Talk to your provider about Calcium and Vitamin D.     Lifestyle    Exercise at least 150 minutes a week (an average of 30 minutes a day, 5 days a week). This will help you control your weight and prevent disease.    Limit alcohol to one drink per day.    No smoking.     Wear sunscreen to prevent skin cancer.  See your dentist every six months for an exam and cleaning.        Thank you for choosing [facility].  You may be receiving a survey in the mail from  [employer] regarding your visit today.  Please take a few minutes to complete and return the survey to let us know how we are doing.      If you have questions or concerns, please contact us via [employer] or you can contact your care team at [phone].    Our Clinic hours are:  Monday 6:40 am  to 7:00 pm  Tuesday -Friday 6:40 am to 5:00 pm    The [place] outpatient lab hours are:  Monday - Friday 6:10 am to 4:45 pm  Saturdays 7:00 am to 11:00 am  Appointments are required, call [phone]      If you have clinical questions after hours or would like to schedule an appointment,  call the clinic at [phone].
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
## 2018-05-10 NOTE — MR AVS SNAPSHOT
After Visit Summary   5/10/2018    Olesya Cintron    MRN: [id number]           Patient Information     Date Of Birth          1975        Visit Information        Provider Department      5/10/2018 2:20 PM Saman Singh MD [facility]        Today's Diagnoses     Well woman exam with routine gynecological exam    -  1    Acute bronchitis, unspecified organism        Encounter for surveillance of contraceptive pills        Gastroesophageal reflux disease without esophagitis          Care Instructions    Reflux: continue omeprazole daily for few months to a year  After that you can step down to zantac twice daily.    Plan fasting labs for cholesterol and glucose when it works for you, schedule.    Preventive Health Recommendations  Female Ages 40 to 49    Yearly exam:     See your health care provider every year in order to  1. Review health changes.   2. Discuss preventive care.    3. Review your medicines if your doctor prescribed any.      Get a Pap test every three years (unless you have an abnormal result and your provider advises testing more often).      If you get Pap tests with HPV test, you only need to test every 5 years, unless you have an abnormal result. You do not need a Pap test if your uterus was removed (hysterectomy) and you have not had cancer.      You should be tested each year for STDs (sexually transmitted diseases), if you're at risk.       Ask your doctor if you should have a mammogram. [phone]      Have a colonoscopy (test for colon cancer) if someone in your family has had colon cancer or polyps before age 50.       Have a cholesterol test every 5 years.       Have a diabetes test (fasting glucose) after age 45. If you are at risk for diabetes, you should have this test every 3 years.    Shots: Get a flu shot each year. Get a tetanus shot every 10 years.     Nutrition:     Eat at least 5 servings of fruits and vegetables each day.    Eat  whole-grain bread, whole-wheat pasta and brown rice instead of white grains and rice.    Talk to your provider about Calcium and Vitamin D.     Lifestyle    Exercise at least 150 minutes a week (an average of 30 minutes a day, 5 days a week). This will help you control your weight and prevent disease.    Limit alcohol to one drink per day.    No smoking.     Wear sunscreen to prevent skin cancer.  See your dentist every six months for an exam and cleaning.        Thank you for choosing [facility].  You may be receiving a survey in the mail from [employer] regarding your visit today.  Please take a few minutes to complete and return the survey to let us know how we are doing.      If you have questions or concerns, please contact us via [employer] or you can contact your care team at [phone].    Our Clinic hours are:  Monday 6:40 am  to 7:00 pm  Tuesday -Friday 6:40 am to 5:00 pm    The [place] outpatient lab hours are:  Monday - Friday 6:10 am to 4:45 pm  Saturdays 7:00 am to 11:00 am  Appointments are required, call [phone]      If you have clinical questions after hours or would like to schedule an appointment,  call the clinic at [phone].          Follow-ups after your visit        Future tests that were ordered for you today     Open Future Orders        Priority Expected Expires Ordered    Lipid panel reflex to direct LDL Fasting Routine 5/10/2018 5/10/2019 5/10/2018    **Glucose FUTURE anytime Routine 5/10/2018 5/10/2019 5/10/2018            Who to contact     If you have questions or need follow up information about today's clinic visit or your schedule please contact [facility] directly at [phone].  Normal or non-critical lab and imaging results will be communicated to you by MyChart, letter or phone within 4 business days after the clinic has received the results. If you do not hear from us within 7 days, please contact the clinic through [employer] or phone. If you have a  "critical or abnormal lab result, we will notify you by phone as soon as possible.  Submit refill requests through [employer] or call your pharmacy and they will forward the refill request to us. Please allow 3 business days for your refill to be completed.          Additional Information About Your Visit        [employer] Information     [employer] gives you secure access to your electronic health record. If you see a primary care provider, you can also send messages to your care team and make appointments. If you have questions, please call your primary care clinic.  If you do not have a primary care provider, please call [phone] and they will assist you.        Care EveryWhere ID     This is your Care EveryWhere ID. This could be used by other organizations to access your [facility] medical records  [id number]        Your Vitals Were     Pulse Temperature Height Last Period Pulse Oximetry BMI (Body Mass Index)    93 98.5  F (36.9  C) (Tympanic) 5' 6.75\" (1.695 m) 04/08/2018 100% 23.1 kg/m2       Blood Pressure from Last 3 Encounters:   05/10/18 128/83   04/06/18 117/78   01/05/18 116/83    Weight from Last 3 Encounters:   05/10/18 146 lb 6.4 oz (66.4 kg)   04/06/18 147 lb 3.2 oz (66.8 kg)   01/05/18 154 lb (69.9 kg)                 Today's Medication Changes          These changes are accurate as of 5/10/18  3:12 PM.  If you have any questions, ask your nurse or doctor.               Start taking these medicines.        Dose/Directions    order for DME   Used for:  Acute bronchitis, unspecified organism   Started by:  Saman Singh MD        Equipment being ordered: Valved chamber spacer, use with inhaler.   Quantity:  1 Device   Refills:  0         These medicines have changed or have updated prescriptions.        Dose/Directions    norgestimate-ethinyl estradiol 0.25-35 MG-MCG per tablet   Commonly known as:  MONO-LINYAH   This may have changed:  See the new instructions.   Used for:  Encounter for " surveillance of contraceptive pills   Changed by:  Saman Singh MD        Dose:  1 tablet   Take 1 tablet by mouth daily   Quantity:  84 tablet   Refills:  4       * omeprazole 20 MG CR capsule   Commonly known as:  priLOSEC   This may have changed:  Another medication with the same name was added. Make sure you understand how and when to take each.   Used for:  Gastritis, presence of bleeding unspecified, unspecified chronicity, unspecified gastritis type   Changed by:  Saman Singh MD        Dose:  40 mg   Take 2 capsules (40 mg) by mouth daily For 2 weeks then take 2 capsule (20mg) daily for 2 weeks.   Quantity:  60 capsule   Refills:  0       * omeprazole 20 MG CR capsule   Commonly known as:  priLOSEC   This may have changed:  You were already taking a medication with the same name, and this prescription was added. Make sure you understand how and when to take each.   Used for:  Gastroesophageal reflux disease without esophagitis   Changed by:  Saman Singh MD        Dose:  20 mg   Take 1 capsule (20 mg) by mouth daily   Quantity:  90 capsule   Refills:  3       * Notice:  This list has 2 medication(s) that are the same as other medications prescribed for you. Read the directions carefully, and ask your doctor or other care provider to review them with you.         Where to get your medicines      These medications were sent to [facility] [address]  [address] [address]     Phone:  [phone]     albuterol 108 (90 Base) MCG/ACT Inhaler    norgestimate-ethinyl estradiol 0.25-35 MG-MCG per tablet    omeprazole 20 MG CR capsule         Some of these will need a paper prescription and others can be bought over the counter.  Ask your nurse if you have questions.     Bring a paper prescription for each of these medications     order for DME                Primary Care Provider Office Phone # Fax #    Bere Harris NP  [phone]       [address] [facility] [address]        Equal Access to Services     LUCINA MCMAHAN : Hadii aad ku hadeloycatie Cassandra, waprasannada emmievivien, lauryta kakenyada bety, makenzie guerra macieirvin blanca laBaridylan hussein. So [facility] [phone].    ATENCIÓN: Si habla español, tiene a doyle disposición servicios gratuitos de asistencia lingüística. Llame al [phone].    We comply with applicable federal civil rights laws and [place] laws. We do not discriminate on the basis of race, color, national origin, age, disability, sex, sexual orientation, or gender identity.            Thank you!     Thank you for choosing [facility]  for your care. Our goal is always to provide you with excellent care. Hearing back from our patients is one way we can continue to improve our services. Please take a few minutes to complete the written survey that you may receive in the mail after your visit with us. Thank you!             Your Updated Medication List - Protect others around you: Learn how to safely use, store and throw away your medicines at www.disposemymeds.org.          This list is accurate as of 5/10/18  3:12 PM.  Always use your most recent med list.                   Brand Name Dispense Instructions for use Diagnosis    * albuterol (2.5 MG/3ML) 0.083% neb solution     30 vial    Take 1 vial (2.5 mg) by nebulization every 6 hours as needed for shortness of breath / dyspnea or wheezing    Acute bronchitis, unspecified organism       * albuterol 108 (90 Base) MCG/ACT Inhaler    PROAIR HFA/PROVENTIL HFA/VENTOLIN HFA    1 Inhaler    Inhale 2 puffs into the lungs every 6 hours as needed for shortness of breath / dyspnea or wheezing    Acute bronchitis, unspecified organism       fluticasone 50 MCG/ACT spray    FLONASE    16 g    USE ONE TO TWO SPRAYS IN BOTH NOSTRILS ONCE DAILY AS NEEDED FOR RHINITIS OR ALLERGIES    Acute maxillary sinusitis, recurrence not specified       ibuprofen 200 MG  tablet    ADVIL/MOTRIN     Take 200 mg by mouth every 4 hours as needed for mild pain        Multi-vitamin Tabs tablet      Take 1 tablet by mouth daily        norgestimate-ethinyl estradiol 0.25-35 MG-MCG per tablet    MONO-LINYAH    84 tablet    Take 1 tablet by mouth daily    Encounter for surveillance of contraceptive pills       * omeprazole 20 MG CR capsule    priLOSEC    60 capsule    Take 2 capsules (40 mg) by mouth daily For 2 weeks then take 2 capsule (20mg) daily for 2 weeks.    Gastritis, presence of bleeding unspecified, unspecified chronicity, unspecified gastritis type       * omeprazole 20 MG CR capsule    priLOSEC    90 capsule    Take 1 capsule (20 mg) by mouth daily    Gastroesophageal reflux disease without esophagitis       order for DME     1 Units    Nebulizer    Acute bronchitis, unspecified organism       order for DME     1 Device    Equipment being ordered: Valved chamber spacer, use with inhaler.    Acute bronchitis, unspecified organism       sulfamethoxazole-trimethoprim 800-160 MG per tablet    BACTRIM DS/SEPTRA DS    20 tablet    Take 1 tablet by mouth daily as needed After intercourse    Recurrent urinary tract infection       traZODone 25 mg Tabs half-tab    DESYREL     Take 50 mg by mouth At Bedtime 1 to 1 1/2 tablets at bedtime        UNABLE TO FIND      MEDICATION NAME: Mydayis 37.5 mg daily        VITAMIN D (CHOLECALCIFEROL) PO      Take by mouth daily        * Notice:  This list has 4 medication(s) that are the same as other medications prescribed for you. Read the directions carefully, and ask your doctor or other care provider to review them with you.

## 2018-07-09 DIAGNOSIS — Z01.419 WELL WOMAN EXAM WITH ROUTINE GYNECOLOGICAL EXAM: ICD-10-CM

## 2018-07-09 LAB
CHOLEST SERPL-MCNC: 198 MG/DL
GLUCOSE SERPL-MCNC: 85 MG/DL (ref 70–99)
HDLC SERPL-MCNC: 58 MG/DL
LDLC SERPL CALC-MCNC: 90 MG/DL
NONHDLC SERPL-MCNC: 140 MG/DL
TRIGL SERPL-MCNC: 249 MG/DL

## 2018-07-09 PROCEDURE — 80061 LIPID PANEL: CPT | Performed by: FAMILY MEDICINE

## 2018-07-09 PROCEDURE — 36415 COLL VENOUS BLD VENIPUNCTURE: CPT | Performed by: FAMILY MEDICINE

## 2018-07-09 PROCEDURE — 82947 ASSAY GLUCOSE BLOOD QUANT: CPT | Performed by: FAMILY MEDICINE

## 2018-07-16 ENCOUNTER — OFFICE VISIT (OUTPATIENT)
Dept: ORTHOPEDICS | Facility: CLINIC | Age: 43
End: 2018-07-16
Payer: COMMERCIAL

## 2018-07-16 ENCOUNTER — RADIANT APPOINTMENT (OUTPATIENT)
Dept: GENERAL RADIOLOGY | Facility: CLINIC | Age: 43
End: 2018-07-16
Attending: PEDIATRICS
Payer: COMMERCIAL

## 2018-07-16 ENCOUNTER — HOSPITAL ENCOUNTER (OUTPATIENT)
Dept: MAMMOGRAPHY | Facility: CLINIC | Age: 43
Discharge: HOME | End: 2018-07-16
Attending: FAMILY MEDICINE | Admitting: FAMILY MEDICINE
Payer: COMMERCIAL

## 2018-07-16 VITALS
BODY MASS INDEX: 22.91 KG/M2 | HEIGHT: 67 IN | WEIGHT: 146 LBS | DIASTOLIC BLOOD PRESSURE: 89 MMHG | SYSTOLIC BLOOD PRESSURE: 134 MMHG

## 2018-07-16 DIAGNOSIS — M54.2 CERVICALGIA: ICD-10-CM

## 2018-07-16 DIAGNOSIS — Z12.31 VISIT FOR SCREENING MAMMOGRAM: ICD-10-CM

## 2018-07-16 DIAGNOSIS — M54.2 CERVICALGIA: Primary | ICD-10-CM

## 2018-07-16 PROCEDURE — 99203 OFFICE O/P NEW LOW 30 MIN: CPT | Performed by: PEDIATRICS

## 2018-07-16 PROCEDURE — 77067 SCR MAMMO BI INCL CAD: CPT

## 2018-07-16 PROCEDURE — 72040 X-RAY EXAM NECK SPINE 2-3 VW: CPT

## 2018-07-16 NOTE — PATIENT INSTRUCTIONS
Plan:  - Today's Plan of Care:  Rehab: [facility] [phone]    Follow Up: 4-6 weeks    If you have any further questions for your physician or physician s care team you can call [phone] and use option 3 to leave a voice message. Calls received during business hours will be returned same day.

## 2018-07-16 NOTE — PROGRESS NOTES
Sports Medicine Clinic Visit    PCP: Bere Harris Abdulaziz is a 43 year old female who is seen  as a self referral presenting with left sided neck pain    Injury: Patient reports neck pain started in April after getting a massage and stretching from her .  She reports left anterior neck pain and warmth.  Her neck pain worsened ~ 2 weeks ago when she fell out of a hammock and hit her head on the ground, forcing her neck into flexion.  She reports posterior neck pain since then.  Sometimes tension headaches.  Intermittent numbness into left arm (though not consistent).  - also having some dizziness, sensitivity to light and noise since the injury, though overall improving    Location of Pain: neck   Duration of Pain: 2 week(s)  Rating of Pain at worst: 6/10  Rating of Pain Currently: 3/10  Symptoms are better with: Tylenol, Ibuprofen and chiropractic care  Symptoms are worse with: opening a door and throwing motion  Additional Features:   Positive: pain and head ache   Negative: swelling, bruising, popping, grinding, catching, locking, instability, paresthesias, numbness and weakness  Other evaluation and/or treatments so far consists of: Ice, Tylenol, Ibuprofen and chiropractic care  Prior History of related problems: nothing    Social History:     Review of Systems  Skin: no bruising, no swelling  Musculoskeletal: as above  Neurologic: no numbness, paresthesias  Remainder of review of systems is negative including constitutional, CV, pulmonary, GI, except as noted in HPI or medical history.    Patient's current problem list, past medical and surgical history, and family history were reviewed.    Patient Active Problem List   Diagnosis     History of urinary tract infection     Other alopecia     CARDIOVASCULAR SCREENING; LDL GOAL LESS THAN 160     Pelvic pain in female     Elevated blood pressure     Vulvar vestibulitis     BV (bacterial vaginosis)     ADHD (attention  "deficit hyperactivity disorder)     Allergic rhinitis     Recurrent urinary tract infection     Past Medical History:   Diagnosis Date     Depression     age 12     Eating disorder     anorexia age 12     Helicobacter pylori (H. pylori)     txed with abx     History of frequent urinary tract infections      MEDICAL HISTORY OF -     fx tailbone age 12     Mononucleosis     age 11     Personal history of other malignant neoplasm of skin     precancer left ear, left foot      labor ,     S/P LEEP of cervix     path results??     Past Surgical History:   Procedure Laterality Date     HC COLONOSCOPY THRU STOMA, DIAGNOSTIC      normal     HC REMOVE TONSILS/ADENOIDS,12+ Y/O       LEEP TX, CERVICAL       SURGICAL HISTORY OF -   age 6    Hydrodistention of bladder     SURGICAL HISTORY OF -       Local excision of skin precancer- foot and ear     UPPER GI ENDOSCOPY      H-pylori     Family History   Problem Relation Age of Onset     Thyroid Disease Mother      Hypertension Mother      Lipids Mother      Lipids Father      Hypertension Father      HEART DISEASE Father      bypass surgery     GASTROINTESTINAL DISEASE Father      ulcer     Prostate Cancer Paternal Grandfather      Alzheimer Disease Paternal Grandmother      Cardiovascular Maternal Grandfather      MI     Diabetes Maternal Grandfather      Breast Cancer No family hx of      Colon Cancer No family hx of        Objective  /89 (BP Location: Left arm, Patient Position: Chair, Cuff Size: Adult Regular)  Ht 5' 6.75\" (1.695 m)  Wt 146 lb (66.2 kg)  BMI 23.04 kg/m2    GENERAL APPEARANCE: healthy, alert and no distress   GAIT: NORMAL  SKIN: no suspicious lesions or rashes  HEENT: Sclera clear, anicteric  CV: good peripheral pulses  RESP: Breathing not labored  NEURO: Normal strength and tone, mentation intact and speech normal  PSYCH:  mentation appears normal and affect normal/bright    Cervical Spine Exam    Inspection:       " No visible deformity        normal lordotic curvature maintained    Posture:      rounded shoulders and upper back    Tender:      paraspinal muscles       upper border of trapezius       Left sternocleidomastoid muscle    Non-Tender:      remainder of cervical spine area    Range of Motion:       Full active and passive ROM forward flexion, extension, lateral rotation, lateral flexion.    Painful Motions:       extension        lateral rotation        lateral flexion    Strength:     C4 (shoulder shrug)  symmetric 5/5       C5 (shoulder abduction) symmetric 5/5       C6 (elbow flexion) symmetric 5/5       C7 (elbow extension) symmetric 5/5       C8 (finger abduction, thumb flexion) symmetric 5/5    Reflexes:      C5 (biceps) symmetric normal       C6 (supinator) symmetric normal       C7 (triceps) symmetric normal    Sensation:     grossly intact througout bilateral upper extremities    Special Tests:      neg (-) Spurling            neg (-) hyperabduction maneuver     Skin:     well perfused       capillary refill brisk    Lymphatics:      no edema noted in the upper extremities     Radiology  I ordered, visualized and reviewed these images with the patient  2 XR views of cervical spine reviewed: no acute bony abnormality, no significant degenerative change  - will follow official read    Assessment:  1. Cervicalgia      Cervicalgia with intermittent arm pain, discussed differential of thoracic outlet syndrome or radiculopathy, however, unlikely given exam.  I recommend physical therapy, would consider further imaging pending clinical course.  - Possible concussion as well given history and current symptoms.  Brief neurologic exam normal.  Discussed our current understanding of concussion, pathophysiology, symptoms, prognosis, risk of re-injury, and possible complications, as well as typical management for this condition.  Counseled on importance of rest from physical and cognitive activities until asymptomatic,  followed by graduated return to activity with close monitoring for recurrence of symptoms. Discussed in depth what the patient should avoid, as well as worrisome signs, symptoms, and reasons to go to the ED.  Discussed avoiding analgesics, which may mask some symptoms.  Recommend follow up with PCP or myself if symptoms do not improve.    Plan:  - Today's Plan of Care:  Rehab: [phone]    Follow Up: 4-6 weeks    Concerning signs and symptoms were reviewed.  The patient expressed understanding of this management plan and all questions were answered at this time.    Roxanne Sawant MD CAQ  Primary Care Sports Medicine  [facility]

## 2018-07-16 NOTE — LETTER
7/16/2018         RE: Olesya Cintron  [address] [place] [address]  [place] [address]        Dear Colleague,    Thank you for referring your patient, Olesya Cintron, to the [facility]. Please see a copy of my visit note below.    Sports Medicine Clinic Visit    PCP: Bere Harris    Olesya Cintron is a 43 year old female who is seen  as a self referral presenting with left sided neck pain    Injury: Patient reports neck pain started in April after getting a massage and stretching from her .  She reports left anterior neck pain and warmth.  Her neck pain worsened ~ 2 weeks ago when she fell out of a hammock and hit her head on the ground, forcing her neck into flexion.  She reports posterior neck pain since then.  Sometimes tension headaches.  Intermittent numbness into left arm (though not consistent).  - also having some dizziness, sensitivity to light and noise since the injury, though overall improving    Location of Pain: neck   Duration of Pain: 2 week(s)  Rating of Pain at worst: 6/10  Rating of Pain Currently: 3/10  Symptoms are better with: Tylenol, Ibuprofen and chiropractic care  Symptoms are worse with: opening a door and throwing motion  Additional Features:   Positive: pain and head ache   Negative: swelling, bruising, popping, grinding, catching, locking, instability, paresthesias, numbness and weakness  Other evaluation and/or treatments so far consists of: Ice, Tylenol, Ibuprofen and chiropractic care  Prior History of related problems: nothing    Social History:     Review of Systems  Skin: no bruising, no swelling  Musculoskeletal: as above  Neurologic: no numbness, paresthesias  Remainder of review of systems is negative including constitutional, CV, pulmonary, GI, except as noted in HPI or medical history.    Patient's current problem list, past medical and surgical history, and family history were reviewed.    Patient Active Problem List  "  Diagnosis     History of urinary tract infection     Other alopecia     CARDIOVASCULAR SCREENING; LDL GOAL LESS THAN 160     Pelvic pain in female     Elevated blood pressure     Vulvar vestibulitis     BV (bacterial vaginosis)     ADHD (attention deficit hyperactivity disorder)     Allergic rhinitis     Recurrent urinary tract infection     Past Medical History:   Diagnosis Date     Depression     age 12     Eating disorder     anorexia age 12     Helicobacter pylori (H. pylori)     txed with abx     History of frequent urinary tract infections      MEDICAL HISTORY OF -     fx tailbone age 12     Mononucleosis     age 11     Personal history of other malignant neoplasm of skin     precancer left ear, left foot      labor ,     S/P LEEP of cervix     path results??     Past Surgical History:   Procedure Laterality Date     HC COLONOSCOPY THRU STOMA, DIAGNOSTIC      normal     HC REMOVE TONSILS/ADENOIDS,12+ Y/O       LEEP TX, CERVICAL       SURGICAL HISTORY OF -   age 6    Hydrodistention of bladder     SURGICAL HISTORY OF -       Local excision of skin precancer- foot and ear     UPPER GI ENDOSCOPY      H-pylori     Family History   Problem Relation Age of Onset     Thyroid Disease Mother      Hypertension Mother      Lipids Mother      Lipids Father      Hypertension Father      HEART DISEASE Father      bypass surgery     GASTROINTESTINAL DISEASE Father      ulcer     Prostate Cancer Paternal Grandfather      Alzheimer Disease Paternal Grandmother      Cardiovascular Maternal Grandfather      MI     Diabetes Maternal Grandfather      Breast Cancer No family hx of      Colon Cancer No family hx of        Objective  /89 (BP Location: Left arm, Patient Position: Chair, Cuff Size: Adult Regular)  Ht 5' 6.75\" (1.695 m)  Wt 146 lb (66.2 kg)  BMI 23.04 kg/m2    GENERAL APPEARANCE: healthy, alert and no distress   GAIT: NORMAL  SKIN: no suspicious lesions or rashes  HEENT: " Sclera clear, anicteric  CV: good peripheral pulses  RESP: Breathing not labored  NEURO: Normal strength and tone, mentation intact and speech normal  PSYCH:  mentation appears normal and affect normal/bright    Cervical Spine Exam    Inspection:       No visible deformity        normal lordotic curvature maintained    Posture:      rounded shoulders and upper back    Tender:      paraspinal muscles       upper border of trapezius       Left sternocleidomastoid muscle    Non-Tender:      remainder of cervical spine area    Range of Motion:       Full active and passive ROM forward flexion, extension, lateral rotation, lateral flexion.    Painful Motions:       extension        lateral rotation        lateral flexion    Strength:     C4 (shoulder shrug)  symmetric 5/5       C5 (shoulder abduction) symmetric 5/5       C6 (elbow flexion) symmetric 5/5       C7 (elbow extension) symmetric 5/5       C8 (finger abduction, thumb flexion) symmetric 5/5    Reflexes:      C5 (biceps) symmetric normal       C6 (supinator) symmetric normal       C7 (triceps) symmetric normal    Sensation:     grossly intact througout bilateral upper extremities    Special Tests:      neg (-) Spurling            neg (-) hyperabduction maneuver     Skin:     well perfused       capillary refill brisk    Lymphatics:      no edema noted in the upper extremities     Radiology  I ordered, visualized and reviewed these images with the patient  2 XR views of cervical spine reviewed: no acute bony abnormality, no significant degenerative change  - will follow official read    Assessment:  1. Cervicalgia      Cervicalgia with intermittent arm pain, discussed differential of thoracic outlet syndrome or radiculopathy, however, unlikely given exam.  I recommend physical therapy, would consider further imaging pending clinical course.  - Possible concussion as well given history and current symptoms.  Brief neurologic exam normal.  Discussed our current  understanding of concussion, pathophysiology, symptoms, prognosis, risk of re-injury, and possible complications, as well as typical management for this condition.  Counseled on importance of rest from physical and cognitive activities until asymptomatic, followed by graduated return to activity with close monitoring for recurrence of symptoms. Discussed in depth what the patient should avoid, as well as worrisome signs, symptoms, and reasons to go to the ED.  Discussed avoiding analgesics, which may mask some symptoms.  Recommend follow up with PCP or myself if symptoms do not improve.    Plan:  - Today's Plan of Care:  Rehab: [facility]: [facility] - [phone]    Follow Up: 4-6 weeks    Concerning signs and symptoms were reviewed.  The patient expressed understanding of this management plan and all questions were answered at this time.    Roxanne Sawant MD CAQ  Primary Care Sports Medicine  [facility]    Again, thank you for allowing me to participate in the care of your patient.        Sincerely,        Roxanne Sawant MD

## 2018-07-16 NOTE — Clinical Note
Sending you this patient for neck pain.  She likely had a concussion as well from her injury history.  I discussed briefly what to avoid and what to look out for.  Recommended follow up if those concussion symptoms don't improve.  Let me know if other concerns. Roxanne

## 2018-07-25 ENCOUNTER — HOSPITAL ENCOUNTER (OUTPATIENT)
Dept: PHYSICAL THERAPY | Facility: CLINIC | Age: 43
Setting detail: THERAPIES SERIES
End: 2018-07-25
Attending: PEDIATRICS
Payer: COMMERCIAL

## 2018-07-25 PROCEDURE — 97161 PT EVAL LOW COMPLEX 20 MIN: CPT | Mod: GP | Performed by: PHYSICAL THERAPIST

## 2018-07-25 PROCEDURE — 97110 THERAPEUTIC EXERCISES: CPT | Mod: GP | Performed by: PHYSICAL THERAPIST

## 2018-07-25 PROCEDURE — 40000767 ZZHC STATISTIC PT CONCUSSION VISIT: Performed by: PHYSICAL THERAPIST

## 2018-07-25 PROCEDURE — 97140 MANUAL THERAPY 1/> REGIONS: CPT | Mod: GP | Performed by: PHYSICAL THERAPIST

## 2018-07-25 NOTE — PROGRESS NOTES
PHYSICAL THERAPY INITIAL EVALUATION  07/25/18 1400   Quick Adds   Quick Adds Vestibular Eval   Type of Visit Initial OP PT Evaluation   General Information   Start of Care Date 07/25/18   Referring Physician Dr. Sawant   Orders Evaluate and Treat as Indicated   Order Date 07/16/18   Medical Diagnosis cervicalgia   Onset of illness/injury or Date of Surgery 07/04/18   Precautions/Limitations no known precautions/limitations   Surgical/Medical history reviewed Yes   Pertinent history of current problem (include personal factors and/or comorbidities that impact the POC) Couple months ago was had massage and sore next day. had her  rub her neck and had more pain. Gradually got a little better. Then 2 weeks ago fell out of a hammock. Hit back of head pushign neck to chin. Neck pain worsened after this and starting having headaches which was new, Also having some nausea with movement, sound senesitivity and dizziness after this although all symptoms seem to be imprving slowly.  Feels neck pain most with sitting or laying down. Has been going to the chiropractor 2x a week which has been helping. Feeling really nauseated today and a little dizzy.     Prior level of function comment independent   Patient role/Employment history Employed  (teacher)   Current Assistive Devices (none)   Patient/Family Goals Statement to feel normal   Fall Risk Screen   Fall screen completed by PT   Have you fallen 2 or more times in the past year? No   Have you fallen and had an injury in the past year? No   Is patient a fall risk? No   Pain   Patient currently in pain Yes   Pain location L side neck   Pain rating 5/10   Posture   Posture Comments forward shoulders, tight pec minor B   Palpation   Palpation tender L SCM, scalenes, L masseter    Range of Motion (ROM)   ROM Comment Shoulder AROM WNL all directions without pain. Thoracic AROM Rotation 80% B tightness felt in back    Musculoskeletal Special Tests   Musculoskeletal Special  Tests - spurling   Gait   Gait Comments normal gait, no evidence for imbalance   Cervicogenic Screen   Neck ROM WNL, tightness felt in posterior neck with flexion    Vertebral Artery Test Normal   Alar Ligament Test Normal   Transverse Ligament Test Normal   Oculomotor Exam   Smooth Pursuit Normal   Saccades Normal   VOR Normal   VOR Comments horizontal- pt reported it was tiring    VOR Cancellation Normal   Convergence Testing Normal   Infrared Goggle Exam or Frenzel Lense Exam   Exam completed with Room Light   Spontaneous Nystagmus Negative   Gaze Evoked Nystagmus Negative   Planned Therapy Interventions   Planned Therapy Interventions joint mobilization;neuromuscular re-education;ROM;strengthening;stretching;manual therapy   Clinical Impression   Criteria for Skilled Therapeutic Interventions Met yes, treatment indicated   PT Diagnosis neck pain, nausea, dizziness    Influenced by the following impairments neck pain, nausea, dizziness    Functional limitations due to impairments sleeping, sitting, exertion   Clinical Presentation Stable/Uncomplicated   Clinical Presentation Rationale improving symptoms   Clinical Decision Making (Complexity) Low complexity   Therapy Frequency 1 time/week   Predicted Duration of Therapy Intervention (days/wks) 6 weeks   Risk & Benefits of therapy have been explained Yes   Patient, Family & other staff in agreement with plan of care Yes   Clinical Impression Comments Patient presenting with history of neck pain from the spring, aggravated after falling out of hammock. Neck pain appears mostly muscular in nature. Patient also presenting with symptoms consistent of a possible concussion although neuro exam was relatively normal today.    Education Assessment   Preferred Learning Style Listening;Demonstration;Pictures/video   Barriers to Learning No barriers   GOALS   PT Eval Goals 1;2;3;4   Goal 1   Goal Identifier 1   Goal Description Patient will be able to sleep on left side  without increased pain.   Target Date 09/05/18   Goal 2   Goal Identifier 2   Goal Description Patient will be able to sit 60 mintues without increase pain.   Target Date 09/05/18   Goal 3   Goal Identifier 3   Goal Description Patient will report no nausea or dizziness with mobility.    Target Date 09/05/18   Goal 4   Goal Identifier 4   Goal Description Patient will be independent and compliant with HEP to aid functional recovery.   Target Date 09/05/18   Total Evaluation Time   Total Evaluation Time (Minutes) 30       Please contact me with any questions or concerns.  Thank you for your referral.    Nunu Oviedo, PT, DPT, OCS  Physical Therapist, Orthopedic Certified Specialist  [facility]  [phone]

## 2018-08-02 ENCOUNTER — HOSPITAL ENCOUNTER (OUTPATIENT)
Dept: PHYSICAL THERAPY | Facility: CLINIC | Age: 43
Setting detail: THERAPIES SERIES
End: 2018-08-02
Attending: PEDIATRICS
Payer: COMMERCIAL

## 2018-08-02 PROCEDURE — 97140 MANUAL THERAPY 1/> REGIONS: CPT | Mod: GP | Performed by: PHYSICAL THERAPIST

## 2018-08-02 PROCEDURE — 40000718 ZZHC STATISTIC PT DEPARTMENT ORTHO VISIT: Performed by: PHYSICAL THERAPIST

## 2018-08-02 PROCEDURE — 97110 THERAPEUTIC EXERCISES: CPT | Mod: GP | Performed by: PHYSICAL THERAPIST

## 2018-08-09 ENCOUNTER — HOSPITAL ENCOUNTER (OUTPATIENT)
Dept: PHYSICAL THERAPY | Facility: CLINIC | Age: 43
Setting detail: THERAPIES SERIES
End: 2018-08-09
Attending: PEDIATRICS
Payer: COMMERCIAL

## 2018-08-09 PROCEDURE — 40000718 ZZHC STATISTIC PT DEPARTMENT ORTHO VISIT: Performed by: PHYSICAL THERAPIST

## 2018-08-09 PROCEDURE — 97140 MANUAL THERAPY 1/> REGIONS: CPT | Mod: GP | Performed by: PHYSICAL THERAPIST

## 2018-08-09 PROCEDURE — 97110 THERAPEUTIC EXERCISES: CPT | Mod: GP | Performed by: PHYSICAL THERAPIST

## 2018-08-16 ENCOUNTER — HOSPITAL ENCOUNTER (OUTPATIENT)
Dept: PHYSICAL THERAPY | Facility: CLINIC | Age: 43
Setting detail: THERAPIES SERIES
End: 2018-08-16
Attending: PEDIATRICS
Payer: COMMERCIAL

## 2018-08-16 PROCEDURE — 40000718 ZZHC STATISTIC PT DEPARTMENT ORTHO VISIT: Performed by: PHYSICAL THERAPIST

## 2018-08-16 PROCEDURE — 97140 MANUAL THERAPY 1/> REGIONS: CPT | Mod: GP | Performed by: PHYSICAL THERAPIST

## 2018-08-16 PROCEDURE — 97110 THERAPEUTIC EXERCISES: CPT | Mod: GP | Performed by: PHYSICAL THERAPIST

## 2018-08-23 ENCOUNTER — HOSPITAL ENCOUNTER (OUTPATIENT)
Dept: PHYSICAL THERAPY | Facility: CLINIC | Age: 43
Setting detail: THERAPIES SERIES
End: 2018-08-23
Attending: PEDIATRICS
Payer: COMMERCIAL

## 2018-08-23 PROCEDURE — 97140 MANUAL THERAPY 1/> REGIONS: CPT | Mod: GP | Performed by: PHYSICAL THERAPIST

## 2018-08-23 PROCEDURE — 40000718 ZZHC STATISTIC PT DEPARTMENT ORTHO VISIT: Performed by: PHYSICAL THERAPIST

## 2018-08-23 PROCEDURE — 97110 THERAPEUTIC EXERCISES: CPT | Mod: GP | Performed by: PHYSICAL THERAPIST

## 2018-08-30 ENCOUNTER — HOSPITAL ENCOUNTER (OUTPATIENT)
Dept: PHYSICAL THERAPY | Facility: CLINIC | Age: 43
Setting detail: THERAPIES SERIES
End: 2018-08-30
Attending: PEDIATRICS
Payer: COMMERCIAL

## 2018-08-30 PROCEDURE — 97140 MANUAL THERAPY 1/> REGIONS: CPT | Mod: GP | Performed by: PHYSICAL THERAPIST

## 2018-08-30 PROCEDURE — 40000718 ZZHC STATISTIC PT DEPARTMENT ORTHO VISIT: Performed by: PHYSICAL THERAPIST

## 2018-08-30 PROCEDURE — 97110 THERAPEUTIC EXERCISES: CPT | Mod: GP | Performed by: PHYSICAL THERAPIST

## 2018-11-26 ENCOUNTER — OFFICE VISIT (OUTPATIENT)
Dept: FAMILY MEDICINE | Facility: CLINIC | Age: 43
End: 2018-11-26
Payer: COMMERCIAL

## 2018-11-26 VITALS
HEART RATE: 80 BPM | SYSTOLIC BLOOD PRESSURE: 128 MMHG | TEMPERATURE: 99 F | BODY MASS INDEX: 22.91 KG/M2 | RESPIRATION RATE: 12 BRPM | HEIGHT: 67 IN | DIASTOLIC BLOOD PRESSURE: 82 MMHG | OXYGEN SATURATION: 99 % | WEIGHT: 146 LBS

## 2018-11-26 DIAGNOSIS — M54.2 NECK PAIN: Primary | ICD-10-CM

## 2018-11-26 PROCEDURE — 99213 OFFICE O/P EST LOW 20 MIN: CPT | Performed by: NURSE PRACTITIONER

## 2018-11-26 NOTE — PROGRESS NOTES
"  SUBJECTIVE:   Olesya Cintron is a 43 year old female who presents to clinic today for the following health issues:      Neck Pain- recheck  Getting worse over the last month      Duration: 6-8 months now    Started after her  \"over-stretched\" her neck one day. She felt like a muscle ripped on the left lateral side of her neck or that her collar bone broke.    Has had pain in the lateral and anterior neck muscles and ligaments since then.    Description:  Location: left sided neck pain  Radiation: tight in shoulder muscles, up into ear  No numbness or tingling down arms    Intensity:  Moderate to severe    Accompanying signs and symptoms:      One day a few weeks ago, had pain while yawning- felt a popping sensation in her neck with a \"fluid sensation\" and then pain symptoms completely resolved for 2 days - then came back.    History (similar episodes/previous evaluation): FSOC evaluation and cervical spine xrays - normal.    Worsened after she fell out of a hammock this summer and hit her head on the ground, forcing her neck into flexion    Sore to the touch    Precipitating or alleviating factors: interferes with sleep- using a new pillow;       Therapies tried and outcome: physical therapy and chiropractor;  Muscle relaxer - nothing is helpful      RESPIRATORY SYMPTOMS      Duration: 2 weeks    Description  sore throat, facial pain/pressure and dental pain    Severity: moderate - better after antibiotics     Accompanying signs and symptoms: None    History (predisposing factors):  none    Precipitating or alleviating factors: None    Therapies tried and outcome:  Antibiotic she had at home (Bactrim)      Problem list and histories reviewed & adjusted, as indicated.  Additional history: as documented    Reviewed and updated as needed this visit by clinical staff  Tobacco  Allergies  Meds       Reviewed and updated as needed this visit by Provider         ROS:  Constitutional, HEENT, cardiovascular, " "pulmonary, gi and gu systems are negative, except as otherwise noted.    OBJECTIVE:     /82 (BP Location: Right arm)  Pulse 80  Temp 99  F (37.2  C) (Tympanic)  Resp 12  Ht 5' 6.75\" (1.695 m)  Wt 146 lb (66.2 kg)  SpO2 99%  BMI 23.04 kg/m2  Body mass index is 23.04 kg/(m^2).  GENERAL: healthy, alert and no distress  HENT: ear canals and TM's normal, nose and mouth without ulcers or lesions  NECK: no adenopathy, no asymmetry, masses, or scars and thyroid normal to palpation. No bruit on auscultation.  RESP: lungs clear to auscultation - no rales, rhonchi or wheezes  CV: regular rate and rhythm, normal S1 S2, no S3 or S4, no murmur, click or rub, no peripheral edema and peripheral pulses strong  MS: Neck and shoulder ROM normal. No tenderness to touch.      ASSESSMENT/PLAN:       ICD-10-CM    1. Neck pain M54.2 CT Soft Tissue Neck w Contrast     Pain after  manipulated neck - located in left lateral and anterior neck muscles and soft tissues.  Doubt cervical spine involvement.  Will obtain advanced imaging - further plan pending results.    Patient Instructions   Schedule CT scan: [phone]          The risks, benefits and treatment options of prescribed medications or other treatments have been discussed with the patient. The patient verbalized their understanding and should call or follow up if no improvement or if they develop further problems.    F[facility]  "

## 2018-11-26 NOTE — MR AVS SNAPSHOT
After Visit Summary   11/26/2018    Olesya Cintron    MRN: [id number]           Patient Information     Date Of Birth          1975        Visit Information        Provider Department      11/26/2018 11:00 AM Genie Guerrero APRN CNP [facility]        Today's Diagnoses     Neck pain    -  1      Care Instructions    Schedule CT scan: [phone]            Thank you for choosing [facility].  You may be receiving a survey in the mail from Missy Alas regarding your visit today.  Please take a few minutes to complete and return the survey to let us know how we are doing.      If you have questions or concerns, please contact us via "[employer] or you can contact your care team at [phone].    Our Clinic hours are:  Monday 6:40 am  to 7:00 pm  Tuesday -Friday 6:40 am to 5:00 pm    The [place] outpatient lab hours are:  Monday - Friday 6:10 am to 4:45 pm  Saturdays 7:00 am to 11:00 am  Appointments are required, call [phone]    If you have clinical questions after hours or would like to schedule an appointment,  call the clinic at [phone].            Follow-ups after your visit        Future tests that were ordered for you today     Open Future Orders        Priority Expected Expires Ordered    CT Soft Tissue Neck w Contrast Routine  11/26/2019 11/26/2018            Who to contact     If you have questions or need follow up information about today's clinic visit or your schedule please contact [facility] directly at [phone].  Normal or non-critical lab and imaging results will be communicated to you by [employer], letter or phone within 4 business days after the clinic has received the results. If you do not hear from us within 7 days, please contact the clinic through [employer] or phone. If you have a critical or abnormal lab result, we will notify you by phone as soon as possible.  Submit refill requests through "[employer] or call your pharmacy  "and they will forward the refill request to us. Please allow 3 business days for your refill to be completed.          Additional Information About Your Visit        [email or web address] Information     [employer] gives you secure access to your electronic health record. If you see a primary care provider, you can also send messages to your care team and make appointments. If you have questions, please call your primary care clinic.  If you do not have a primary care provider, please call [phone] and they will assist you.        Care EveryWhere ID     This is your Care EveryWhere ID. This could be used by other organizations to access your [place] medical records  [id number]        Your Vitals Were     Pulse Temperature Respirations Height Pulse Oximetry BMI (Body Mass Index)    80 99  F (37.2  C) (Tympanic) 12 5' 6.75\" (1.695 m) 99% 23.04 kg/m2       Blood Pressure from Last 3 Encounters:   11/26/18 128/82   07/16/18 134/89   05/10/18 128/83    Weight from Last 3 Encounters:   11/26/18 146 lb (66.2 kg)   07/16/18 146 lb (66.2 kg)   05/10/18 146 lb 6.4 oz (66.4 kg)               Primary Care Provider Office Phone # Fax #    Bere Chen Harris [phone] [address] [facility] [address]        Equal Access to Services     LUCINA MCMAHAN : Hadii aad ku hadasho Soomaali, waaxda luqadaha, qaybta kaalmada adeegyada, makenzie hussein. So [facility] [phone].    ATENCIÓN: Si habla español, tiene a doyle disposición servicios gratuitos de asistencia lingüística. Twan al [phone].    We comply with applicable federal civil rights laws and [place] laws. We do not discriminate on the basis of race, color, national origin, age, disability, sex, sexual orientation, or gender identity.            Thank you!     Thank you for choosing [facility]  for your care. Our goal is always to provide you with excellent care. Hearing back from our patients is one way we can " continue to improve our services. Please take a few minutes to complete the written survey that you may receive in the mail after your visit with us. Thank you!             Your Updated Medication List - Protect others around you: Learn how to safely use, store and throw away your medicines at www.disposemymeds.org.          This list is accurate as of 11/26/18 11:34 AM.  Always use your most recent med list.                   Brand Name Dispense Instructions for use Diagnosis    * albuterol (2.5 MG/3ML) 0.083% neb solution    PROVENTIL    30 vial    Take 1 vial (2.5 mg) by nebulization every 6 hours as needed for shortness of breath / dyspnea or wheezing    Acute bronchitis, unspecified organism       * albuterol 108 (90 Base) MCG/ACT inhaler    PROAIR HFA/PROVENTIL HFA/VENTOLIN HFA    1 Inhaler    Inhale 2 puffs into the lungs every 6 hours as needed for shortness of breath / dyspnea or wheezing    Acute bronchitis, unspecified organism       FISH OIL + D3 PO           fluticasone 50 MCG/ACT spray    FLONASE    16 g    USE ONE TO TWO SPRAYS IN BOTH NOSTRILS ONCE DAILY AS NEEDED FOR RHINITIS OR ALLERGIES    Acute maxillary sinusitis, recurrence not specified       ibuprofen 200 MG tablet    ADVIL/MOTRIN     Take 200 mg by mouth every 4 hours as needed for mild pain        Multi-vitamin Tabs tablet      Take 1 tablet by mouth daily        norgestimate-ethinyl estradiol 0.25-35 MG-MCG per tablet    MONO-LINYAH    84 tablet    Take 1 tablet by mouth daily    Encounter for surveillance of contraceptive pills       omeprazole 20 MG DR capsule    priLOSEC    90 capsule    Take 1 capsule (20 mg) by mouth daily    Gastroesophageal reflux disease without esophagitis       order for DME     1 Units    Nebulizer    Acute bronchitis, unspecified organism       order for DME     1 Device    Equipment being ordered: Valved chamber spacer, use with inhaler.    Acute bronchitis, unspecified organism        sulfamethoxazole-trimethoprim 800-160 MG per tablet    BACTRIM DS/SEPTRA DS    20 tablet    Take 1 tablet by mouth daily as needed After intercourse    Recurrent urinary tract infection       traZODone 25 mg Tabs half-tab    DESYREL     Take 50 mg by mouth At Bedtime 1 to 1 1/2 tablets at bedtime        UNABLE TO FIND      MEDICATION NAME: Mydayis 37.5 mg daily        VITAMIN D (CHOLECALCIFEROL) PO      Take by mouth daily        * Notice:  This list has 2 medication(s) that are the same as other medications prescribed for you. Read the directions carefully, and ask your doctor or other care provider to review them with you.

## 2018-11-27 ENCOUNTER — HOSPITAL ENCOUNTER (OUTPATIENT)
Dept: CT IMAGING | Facility: CLINIC | Age: 43
Discharge: HOME | End: 2018-11-27
Attending: NURSE PRACTITIONER | Admitting: NURSE PRACTITIONER
Payer: COMMERCIAL

## 2018-11-27 DIAGNOSIS — M54.2 NECK PAIN: ICD-10-CM

## 2018-11-27 PROCEDURE — 70491 CT SOFT TISSUE NECK W/DYE: CPT

## 2018-11-27 PROCEDURE — 25000125 ZZHC RX 250: Performed by: RADIOLOGY

## 2018-11-27 PROCEDURE — 25000128 H RX IP 250 OP 636: Performed by: RADIOLOGY

## 2018-11-27 RX ORDER — IOPAMIDOL 755 MG/ML
80 INJECTION, SOLUTION INTRAVASCULAR ONCE
Status: COMPLETED | OUTPATIENT
Start: 2018-11-27 | End: 2018-11-27

## 2018-11-27 RX ADMIN — IOPAMIDOL 80 ML: 755 INJECTION, SOLUTION INTRAVENOUS at 16:59

## 2018-11-27 RX ADMIN — SODIUM CHLORIDE 80 ML: 9 INJECTION, SOLUTION INTRAVENOUS at 17:00

## 2018-11-29 ENCOUNTER — MYC MEDICAL ADVICE (OUTPATIENT)
Dept: FAMILY MEDICINE | Facility: CLINIC | Age: 43
End: 2018-11-29

## 2018-11-29 DIAGNOSIS — M54.2 NECK PAIN: Primary | ICD-10-CM

## 2018-11-29 NOTE — TELEPHONE ENCOUNTER
Genie,    Please review patient's DrAvailablet message regarding a phone conversation between you and the patient.     Francine OLIVAS RN

## 2018-11-30 RX ORDER — CYCLOBENZAPRINE HCL 10 MG
5-10 TABLET ORAL 3 TIMES DAILY PRN
Qty: 30 TABLET | Refills: 1 | Status: SHIPPED | OUTPATIENT
Start: 2018-11-30 | End: 2019-11-08

## 2018-12-04 NOTE — ADDENDUM NOTE
Encounter addended by: Nunu Oviedo, PT on: 12/4/2018 10:39 AM<BR>     Actions taken: Sign clinical note, Flowsheet accepted, Episode resolved

## 2018-12-04 NOTE — PROGRESS NOTES
Patient did not return for follow up treatments as directed.  Goal status and current objective information is therefore unknown.  The daily note from the patient's last visit will serve as the discharge note. Discharge from PT services at this time for this episode of treatment. Please see attached documentation under this episode of care for further information including dates of service, start of care date, referring physician, Dx, treatment plan, treatments, etc.    Please contact me with any questions or concerns.  Thank you for your referral.    Nunu Oviedo PT, DPT, OCS  Physical Therapist, Orthopedic Certified Specialist  [facility]  [phone]       PHYSICAL THERAPY DISCHARGE NOTE  08/30/18 1600   Signing Clinician's Name / Credentials   Signing clinician's name / credentials Nunu Oviedo PT, MANUELT, OCS   Session Number   Session Number 6 BCBS   Progress Note/Recertification   Progress Note Due Date 09/05/18   Adult Goals   PT Eval Goals 1;2;3;4   Goal 1   Goal Identifier 1   Goal Description Patient will be able to sleep on left side without increased pain.   Target Date 09/05/18   Goal 2   Goal Identifier 2   Goal Description Patient will be able to sit 60 mintues without increase pain.   Target Date 09/05/18   Goal 3   Goal Identifier 3   Goal Description Patient will report no nausea or dizziness with mobility.    Target Date 09/05/18   Date Met 08/16/18   Goal 4   Goal Identifier 4   Goal Description Patient will be independent and compliant with HEP to aid functional recovery.   Target Date 09/05/18   Subjective Report   Subjective Report Patient reports that she feels her neck is improved.    Treatment Interventions   Interventions Therapeutic Procedure/Exercise;Manual Therapy   Therapeutic Procedure/exercise   Minutes 15   Skilled Intervention HEP instruction, ROM, flexibility to decrease pain and improve function   Patient Response no pain, see  cues below   Treatment Detail progressed supine chin tucks to chin tuck head lift 5' holds x 10 cues to keep chin tucked while lowering down. scap retractions 3 x 10 with good form, instr pt in resisted rowing RTB x 20 cues to hyperextend thoracic and lumbar spine, cues to keep shoulders down. instr pt in lat pull downs RTB x  20 cues to not extend spine and keep shoulders relaxed.   Manual Therapy   Minutes 15   Skilled Intervention MT to improve mobility and decrease pain   Patient Response improved flexibility   Treatment Detail MET: ERS R T1, C2 side glides R grade III, suboccipital release, TPR R levator, C2 MWM R.    Education   Learner Patient   Readiness Eager   Method Booklet/handout;Explanation;Demonstration   Response Verbalizes Understanding;Demonstrates Understanding   Plan   Home program above ex   Updates to plan of care f/u 1 week   Plan for next session MT for neck, progress ex   Total Session Time   Timed Code Treatment Minutes 30   Total Treatment Time (sum of timed and untimed services) 30, te mt   AMBULATORY CLINICS ONLY-MEDICAL AND TREATMENT DIAGNOSIS   Medical Diagnosis cervicalgia   PT Diagnosis neck pain, nausea, dizziness        Please contact me with any questions or concerns.  Thank you for your referral.    Nunu Oviedo, PT, DPT, OCS  Physical Therapist, Orthopedic Certified Specialist  [facility]  [phone]

## 2018-12-07 ENCOUNTER — MYC MEDICAL ADVICE (OUTPATIENT)
Dept: FAMILY MEDICINE | Facility: CLINIC | Age: 43
End: 2018-12-07

## 2019-01-09 ENCOUNTER — HOSPITAL ENCOUNTER (EMERGENCY)
Facility: CLINIC | Age: 44
Discharge: HOME | End: 2019-01-09
Attending: NURSE PRACTITIONER | Admitting: NURSE PRACTITIONER
Payer: COMMERCIAL

## 2019-01-09 VITALS — OXYGEN SATURATION: 100 % | SYSTOLIC BLOOD PRESSURE: 152 MMHG | TEMPERATURE: 97.8 F | DIASTOLIC BLOOD PRESSURE: 87 MMHG

## 2019-01-09 DIAGNOSIS — R30.0 DYSURIA: ICD-10-CM

## 2019-01-09 DIAGNOSIS — R82.998 LEUKOCYTES IN URINE: ICD-10-CM

## 2019-01-09 LAB
ALBUMIN UR-MCNC: NEGATIVE MG/DL
APPEARANCE UR: ABNORMAL
BACTERIA #/AREA URNS HPF: ABNORMAL /HPF
BILIRUB UR QL STRIP: NEGATIVE
COLOR UR AUTO: ABNORMAL
GLUCOSE UR STRIP-MCNC: NEGATIVE MG/DL
HGB UR QL STRIP: ABNORMAL
KETONES UR STRIP-MCNC: NEGATIVE MG/DL
LEUKOCYTE ESTERASE UR QL STRIP: ABNORMAL
NITRATE UR QL: NEGATIVE
PH UR STRIP: 6 PH (ref 5–7)
RBC #/AREA URNS AUTO: 2 /HPF (ref 0–2)
SOURCE: ABNORMAL
SP GR UR STRIP: 1 (ref 1–1.03)
UROBILINOGEN UR STRIP-MCNC: 0 MG/DL (ref 0–2)
WBC #/AREA URNS AUTO: 166 /HPF (ref 0–5)

## 2019-01-09 PROCEDURE — 81001 URINALYSIS AUTO W/SCOPE: CPT | Performed by: NURSE PRACTITIONER

## 2019-01-09 PROCEDURE — 99213 OFFICE O/P EST LOW 20 MIN: CPT | Performed by: NURSE PRACTITIONER

## 2019-01-09 PROCEDURE — 87186 SC STD MICRODIL/AGAR DIL: CPT | Performed by: NURSE PRACTITIONER

## 2019-01-09 PROCEDURE — 87086 URINE CULTURE/COLONY COUNT: CPT | Performed by: NURSE PRACTITIONER

## 2019-01-09 PROCEDURE — 87088 URINE BACTERIA CULTURE: CPT | Performed by: NURSE PRACTITIONER

## 2019-01-09 PROCEDURE — G0463 HOSPITAL OUTPT CLINIC VISIT: HCPCS

## 2019-01-09 RX ORDER — CEPHALEXIN 500 MG/1
500 CAPSULE ORAL 2 TIMES DAILY
Qty: 20 CAPSULE | Refills: 0 | Status: SHIPPED | OUTPATIENT
Start: 2019-01-09 | End: 2019-01-18 | Stop reason: ALTCHOICE

## 2019-01-09 NOTE — ED AVS SNAPSHOT
[facility] Emergency Department  [address] [address]  Phone:  [phone]  Fax:  [phone]                                    Olesya Cintron   MRN: [id number]    Department:  [facility] Emergency Department   Date of Visit:  1/9/2019           After Visit Summary Signature Page    I have received my discharge instructions, and my questions have been answered. I have discussed any challenges I see with this plan with the nurse or doctor.    ..........................................................................................................................................  Patient/Patient Representative Signature      ..........................................................................................................................................  Patient Representative Print Name and Relationship to Patient    ..................................................               ................................................  Date                                   Time    ..........................................................................................................................................  Reviewed by Signature/Title    ...................................................              ..............................................  Date                                               Time          22EPIC Rev 08/18

## 2019-01-10 NOTE — RESULT ENCOUNTER NOTE
Emergency Dept/Urgent Care discharge antibiotic (if prescribed): Cephalexin (Keflex) 500 mg capsule, 1 capsule (500 mg) by mouth 2 times daily for 10 days.  Date of Rx (if applicable):  1/9/19  No changes in treatment per Urine culture protocol.

## 2019-01-10 NOTE — ED PROVIDER NOTES
"  History     Chief Complaint   Patient presents with     UTI     HPI    HPI:   Olesya Cintron is a 43 year old female who presents to the ED/UC with dysuria, frequency, hesitancy, urgency and back aches.  Symptoms began 1 week(s) ago.  She denies long duration, rigors, flank pain, temperature > 101 degrees F., Vomiting, significant nausea or diarrhea and vaginal burning but patient reports that is is chronic.  Pt has been taking bactrim BID for the past 5 days without any improvement.  PT has been two days without any antibiotic or any azo and reports worsening of symptoms.  Patient has history of frequent UTIs and has been doing the bactrim post intercourse.   She was out of bactrim over the holidays and admits she had more intercourse.  Pt also has vaginal pH \"issues\" which usually resolved by vagisil but patient reports she has tried this yesterday and there is no improvement in her burning.  She has tried Azo and Increase fluid intake.  Recent illnesses:  none.  LMP due any day and is currently on ocp and in last week.      Problem List:    Patient Active Problem List    Diagnosis Date Noted     Recurrent urinary tract infection 03/29/2016     Priority: Medium     Allergic rhinitis 10/29/2014     Priority: Medium     Problem list name updated by automated process. Provider to review       ADHD (attention deficit hyperactivity disorder) 07/02/2013     Priority: Medium     Vulvar vestibulitis 06/07/2013     Priority: Medium     BV (bacterial vaginosis) 06/07/2013     Priority: Medium     Elevated blood pressure 11/08/2012     Priority: Medium     Pelvic pain in female 05/22/2012     Priority: Medium     CARDIOVASCULAR SCREENING; LDL GOAL LESS THAN 160 10/31/2010     Priority: Medium     History of urinary tract infection 03/31/2008     Priority: Medium     FREQUENT Q2 month infections, \"documented\" by cx at another facility; txed with hydrodistention at age 6 for frequent UTI  Problem list name updated by " automated process. Provider to review       Other alopecia 2008     Priority: Medium        Past Medical History:    Past Medical History:   Diagnosis Date     Depression      Eating disorder      Helicobacter pylori (H. pylori)      History of frequent urinary tract infections      MEDICAL HISTORY OF -      Mononucleosis      Personal history of other malignant neoplasm of skin       labor ,     S/P LEEP of cervix        Past Surgical History:    Past Surgical History:   Procedure Laterality Date     HC COLONOSCOPY THRU STOMA, DIAGNOSTIC      normal     HC REMOVE TONSILS/ADENOIDS,11+ Y/O       LEEP TX, CERVICAL       SURGICAL HISTORY OF -   age 6    Hydrodistention of bladder     SURGICAL HISTORY OF -       Local excision of skin precancer- foot and ear     UPPER GI ENDOSCOPY      H-pylori       Family History:    Family History   Problem Relation Age of Onset     Thyroid Disease Mother      Hypertension Mother      Lipids Mother      Lipids Father      Hypertension Father      Heart Disease Father         bypass surgery     Gastrointestinal Disease Father         ulcer     Prostate Cancer Paternal Grandfather      Alzheimer Disease Paternal Grandmother      Cardiovascular Maternal Grandfather         MI     Diabetes Maternal Grandfather      Breast Cancer No family hx of      Colon Cancer No family hx of        Social History:  Marital Status:   [2]  Social History     Tobacco Use     Smoking status: Former Smoker     Smokeless tobacco: Never Used   Substance Use Topics     Alcohol use: Yes     Comment: occas-      Drug use: No        Medications:      cephALEXin (KEFLEX) 500 MG capsule   albuterol (2.5 MG/3ML) 0.083% nebulizer solution   albuterol (PROAIR HFA/PROVENTIL HFA/VENTOLIN HFA) 108 (90 Base) MCG/ACT Inhaler   cyclobenzaprine (FLEXERIL) 10 MG tablet   Fish Oil-Cholecalciferol (FISH OIL + D3 PO)   fluticasone (FLONASE) 50 MCG/ACT spray   ibuprofen  (ADVIL,MOTRIN) 200 MG tablet   multivitamin, therapeutic with minerals (MULTI-VITAMIN) TABS tablet   norgestimate-ethinyl estradiol (MONO-LINYAH) 0.25-35 MG-MCG per tablet   omeprazole (PRILOSEC) 20 MG CR capsule   order for DME   order for DME   sulfamethoxazole-trimethoprim (BACTRIM DS/SEPTRA DS) 800-160 MG per tablet   traZODone (DESYREL) 25 MG TABS   UNABLE TO FIND   VITAMIN D, CHOLECALCIFEROL, PO       Review of Systems:  CONSTITUTIONAL: NEGATIVE for fever, chills, change in weight  INTEGUMENTARY/SKIN: NEGATIVE for worrisome rashes, moles or lesions  ENT/MOUTH: NEGATIVE for ear, mouth and throat problems  CV: NEGATIVE for chest pain, palpitations or peripheral edema  MUSCULOSKELETAL: NEGATIVE for significant arthralgias or myalgia  PSYCHIATRIC: NEGATIVE for changes in mood or affect  R: NEGATIVE for significant cough or SOB  As mentioned above in the history present illness. All other systems were reviewed and are negative.    Physical Exam   BP: 152/87  Heart Rate: 78  Temp: 97.8  F (36.6  C)  SpO2: 100 %      Physical Exam  General: healthy, alert, no distress, cooperative and smiling  Head: Normocephalic. No masses, lesions, tenderness or abnormalities  Respiratory: Normal - Clear to auscultation without rales, rhonchi, or wheezing.  Cardiovascular: Regular rate and rhythm, Normal (S1/S2), No murmurs, rubs or gallops  Abdomen: Abdomen soft, non-tender. BS normal. No masses or organomegaly, pt reports supapubic pressure  : not performed and was declined  Back/Spine: Back symmetric, no curvature. ROM normal. No CVA tenderness.      ED Course        Procedures    Results for orders placed or performed during the hospital encounter of 01/09/19 (from the past 24 hour(s))   UA with Microscopic reflex to Culture   Result Value Ref Range    Color Urine Straw     Appearance Urine Slightly Cloudy     Glucose Urine Negative NEG^Negative mg/dL    Bilirubin Urine Negative NEG^Negative    Ketones Urine Negative  NEG^Negative mg/dL    Specific Gravity Urine 1.004 1.003 - 1.035    Blood Urine Small (A) NEG^Negative    pH Urine 6.0 5.0 - 7.0 pH    Protein Albumin Urine Negative NEG^Negative mg/dL    Urobilinogen mg/dL 0.0 0.0 - 2.0 mg/dL    Nitrite Urine Negative NEG^Negative    Leukocyte Esterase Urine Large (A) NEG^Negative    Source Midstream Urine     WBC Urine 166 (H) 0 - 5 /HPF    RBC Urine 2 0 - 2 /HPF    Bacteria Urine Moderate (A) NEG^Negative /HPF       Medications - No data to display    Assessments & Plan (with Medical Decision Making)     I have reviewed the nursing notes.    I have reviewed the findings, diagnosis, plan and need for follow up with the patient.  Delmis is a 43-year-old female presenting to urgent care with a one-week history of dysuria, urgency, frequency, burning of urination without any signs of pyelonephritis such as acute fevers, chills, sweats, myalgias.  Patient has a history of frequent urinary tract infections and has Bactrim at home for which she is supposed to take 1 tablet post intercourse.  Patient states she had been going without the Bactrim for this prophylaxis.  Patient reports more intercourse over the past few weeks than usual.  Patient states when she noted the symptoms approximately 1 week ago she went and filled the prescription and took the Bactrim 1 tablet p.o. twice daily for 5 days and then stopped due to lack of response.  Patient also reports history of vaginal pH issues for which she uses Vagisil as needed.  Patient has been using this as well with mild relief.  Exam as noted above patient declined wet prep and this was offered due to history.  Urinalysis obtained and reveals large leukocyte esterase and 166 white blood cells.  No evidence of pyelonephritis renal or ureteral stones.  Vaginitis examination was declined.  We will treat as dysuria with likely urinary tract infection with Keflex due to patient recently being on Bactrim and also allergy to ciprofloxacin.   Patient states her penicillin allergy is from childhood and was a rash as far she can remember.  Encourage patient to follow-up in 2-3 days if no improvement of symptoms.  Patient verbalizes understanding and was discharged in stable condition.     Medication List      Started    cephALEXin 500 MG capsule  Commonly known as:  KEFLEX  500 mg, Oral, 2 TIMES DAILY            Final diagnoses:   Leukocytes in urine   Dysuria       1/9/2019   [facility] EMERGENCY DEPARTMENT     Randa Luna APRN CNP  01/09/19 [id number]

## 2019-01-11 NOTE — RESULT ENCOUNTER NOTE
Await final culture report per [facility] ED Lab Result protocol.  RN confirmed Patient was prescribed antibiotic from ED visit.

## 2019-01-12 ENCOUNTER — TELEPHONE (OUTPATIENT)
Dept: EMERGENCY MEDICINE | Facility: CLINIC | Age: 44
End: 2019-01-12

## 2019-01-12 LAB
BACTERIA SPEC CULT: ABNORMAL
BACTERIA SPEC CULT: ABNORMAL
Lab: ABNORMAL
SPECIMEN SOURCE: ABNORMAL

## 2019-01-12 RX ORDER — CEFDINIR 300 MG/1
300 CAPSULE ORAL 2 TIMES DAILY
Qty: 14 CAPSULE | Refills: 0 | Status: CANCELLED | OUTPATIENT
Start: 2019-01-12 | End: 2019-01-19

## 2019-01-12 NOTE — TELEPHONE ENCOUNTER
"[facility] Emergency Department Lab result notification [Adult-Female]    [place] ED lab result protocol used  Urine Culture    Reason for call  Notify of lab results, assess symptoms,  review ED providers recommendations/discharge instructions (if necessary) and advise per ED lab result f/u protocol    Lab Result (including Rx patient on, if applicable)  Final Urine Culture Report on 1/12/18.  Emergency Dept/Urgent Care discharge antibiotic prescribed: Cephalexin (Keflex) 500 mg capsule, 1 capsule (500 mg) by mouth 2 times daily for 10 days.  Bacteria #1: 50,000 to 100,000 colonies/ml Escherichia coli is [SUSCEPTIBLE] to antibiotic  Bacteria #2: 10,000 to 50,000 colonies/ml Strain 2 Escherichia coli is [INTERMEDIATE SUSCEPTIBLE] to antibiotic  Recommendations in treatment per  ED Lab result protocol.  Information table from ED Provider visit on 1/9/19  Symptoms reported at ED visit (Chief complaint, HPI)  UTI      HPI     HPI:   Olesya Cintron is a 43 year old female who presents to the ED/UC with dysuria, frequency, hesitancy, urgency and back aches.  Symptoms began 1 week(s) ago.  She denies long duration, rigors, flank pain, temperature > 101 degrees F., Vomiting, significant nausea or diarrhea and vaginal burning but patient reports that is is chronic.  Pt has been taking bactrim BID for the past 5 days without any improvement.  PT has been two days without any antibiotic or any azo and reports worsening of symptoms.  Patient has history of frequent UTIs and has been doing the bactrim post intercourse.   She was out of bactrim over the holidays and admits she had more intercourse.  Pt also has vaginal pH \"issues\" which usually resolved by vagisil but patient reports she has tried this yesterday and there is no improvement in her burning.  She has tried Azo and Increase fluid intake.  Recent illnesses:  none.  LMP due any day and is currently on ocp and in last week.       Significant Medical " hx, if applicable (i.e. CKD, diabetes) Recurrent UTI   Allergies Allergies   Allergen Reactions     Ciprofloxacin Nausea and Vomiting     Penicillin [Penicillins]      Hives        Weight, if applicable Wt Readings from Last 2 Encounters:   11/26/18 66.2 kg (146 lb)   07/16/18 66.2 kg (146 lb)      Coumadin/Warfarin [Yes /No] No   Creatinine Level (mg/dl) Creatinine   Date Value Ref Range Status   01/05/2018 0.77 0.52 - 1.04 mg/dL Final      Creatinine clearance (ml/min), if applicable Creatinine clearance cannot be calculated (Patient's most recent lab result is older than the maximum 90 days allowed.)   Pregnant (Yes/No/NA) No   Breastfeeding (Yes/No/NA) No   ED providers Impression and Plan (applicable information) Delmis is a 43-year-old female presenting to urgent care with a one-week history of dysuria, urgency, frequency, burning of urination without any signs of pyelonephritis such as acute fevers, chills, sweats, myalgias.  Patient has a history of frequent urinary tract infections and has Bactrim at home for which she is supposed to take 1 tablet post intercourse.  Patient states she had been going without the Bactrim for this prophylaxis.  Patient reports more intercourse over the past few weeks than usual.  Patient states when she noted the symptoms approximately 1 week ago she went and filled the prescription and took the Bactrim 1 tablet p.o. twice daily for 5 days and then stopped due to lack of response.  Patient also reports history of vaginal pH issues for which she uses Vagisil as needed.  Patient has been using this as well with mild relief.  Exam as noted above patient declined wet prep and this was offered due to history.  Urinalysis obtained and reveals large leukocyte esterase and 166 white blood cells.  No evidence of pyelonephritis renal or ureteral stones.  Vaginitis examination was declined.  We will treat as dysuria with likely urinary tract infection with Keflex due to patient recently  being on Bactrim and also allergy to ciprofloxacin.  Patient states her penicillin allergy is from childhood and was a rash as far she can remember.  Encourage patient to follow-up in 2-3 days if no improvement of symptoms.  Patient verbalizes understanding and was discharged in stable condition.   ED diagnosis Leukocytes in urine   Dysuria        ED provider Randa Luna APRN CNP      RN Assessment (Patient s current Symptoms), include time called.  [Insert Left message here if message left]  11:12AM: Left voicemail message requesting a call back to [phone] between 10 a.m. and 6:30 p.m., 7 days a week for patient's ED/UC lab results.  May leave a message 24/7, if no one available.     [RN Name]  Meghann Cooper RN  [facility] RN  Lung Nodule and ED Lab Result RN  Epic pool (ED late result f/u RN): [id number]  [facility] INCIDENTAL RADIOLOGY F/U NURSES: P [id number]  # [phone]    Copy of Lab result   Urine Culture [[id number]] (Order [id number])   Exam Information     Exam Date Exam Time Accession # Results    1/9/19  6:35 PM [id number]    Component Results     Specimen Information: Midstream Urine        Component Collected Lab   Specimen Description 01/09/2019  6:35    Midstream Urine    Special Requests 01/09/2019  6:35 PM 75   Specimen received in preservative    Culture Micro (Abnormal) 01/09/2019  6:35    Abnormal   50,000 to 100,000 colonies/mL   Escherichia coli     Culture Micro (Abnormal) 01/09/2019  6:35    Abnormal   10,000 to 50,000 colonies/mL   Strain 2   Escherichia coli     Susceptibility     Escherichia coli (1)     Antibiotic Interpretation Sensitivity Method Status   AMPICILLIN Resistant >=32 ug/mL AAYUSH Final   CEFAZOLIN Sensitive <=4 ug/mL AAYUSH Final    Cefazolin AAYUSH breakpoints are for the treatment of uncomplicated urinary tract   infections.  For the treatment of systemic infections, please contact the   laboratory for additional testing.   CEFOXITIN  Sensitive <=4 ug/mL AAYUSH Final   CEFTAZIDIME Sensitive <=1 ug/mL AAYUSH Final   CEFTRIAXONE Sensitive <=1 ug/mL AAYUSH Final   CIPROFLOXACIN Sensitive <=0.25 ug/mL AAYUSH Final   GENTAMICIN Sensitive <=1 ug/mL AAYUSH Final   LEVOFLOXACIN Sensitive <=0.12 ug/mL AAYUSH Final   NITROFURANTOIN Sensitive <=16 ug/mL AAYUSH Final   TOBRAMYCIN Sensitive <=1 ug/mL AAYUSH Final   Trimethoprim/Sulfa Resistant >=16/304 ug/mL AAYUSH Final   AMPICILLIN/SULBACTAM Intermediate 16 ug/mL AAYUSH Final   Piperacillin/Tazo Sensitive <=4 ug/mL AAYUSH Final   CEFEPIME Sensitive <=1 ug/mL AAYUSH Final   Escherichia coli (2)     Antibiotic Interpretation Sensitivity Method Status   AMPICILLIN Resistant >=32 ug/mL AAYUSH Final   CEFAZOLIN Intermediate 16 ug/mL AAYUSH Final    Cefazolin AAYUSH breakpoints are for the treatment of uncomplicated urinary tract   infections.  For the treatment of systemic infections, please contact the   laboratory for additional testing.   CEFOXITIN Sensitive <=4 ug/mL AAYUSH Final   CEFTAZIDIME Sensitive <=1 ug/mL AAYUSH Final   CEFTRIAXONE Sensitive <=1 ug/mL AAYUSH Final   CIPROFLOXACIN Sensitive <=0.25 ug/mL AAYUSH Final   GENTAMICIN Sensitive <=1 ug/mL AAYUSH Final   LEVOFLOXACIN Sensitive <=0.12 ug/mL AAYUSH Final   NITROFURANTOIN Sensitive <=16 ug/mL AAYUSH Final   TOBRAMYCIN Sensitive <=1 ug/mL AAYUSH Final   Trimethoprim/Sulfa Resistant >=16/304 ug/mL AAYUSH Final   AMPICILLIN/SULBACTAM Intermediate 16 ug/mL AAYUSH Final   Piperacillin/Tazo Sensitive <=4 ug/mL AAYUSH Final   CEFEPIME Sensitive <=1 ug/mL AAYUSH Final

## 2019-01-12 NOTE — LETTER
January 14, 2019        Olesya Cintron  [address] [place] [address]  [place] [address]          Dear Olesya Cintron:    You were seen in the [place] [facility] Department at [facility] DEPARTMENT on 1/09/2019.  We are unable to reach you by phone, so we are sending you this letter.     It is important that you call [place] Emergency Department Lab Result RN at [phone] as we may have to make some changes in your treatment.     Best time to call back is between 10 a.m. and 6 p.m.      Sincerely,     [place] Emergency Department Lab Result RN  [phone]

## 2019-01-12 NOTE — RESULT ENCOUNTER NOTE
Await final culture report per [place] ED Lab Result protocol.  RN confirmed Patient was prescribed antibiotic from ED visit.

## 2019-01-14 NOTE — TELEPHONE ENCOUNTER
[facility] Emergency Department/Urgent Care Lab result notification:    Reason for Letter being mailed out:      Culture (Urine) showing bacteria intermediate susceptible to antibiotic Patient discharged with from the Emergency Dept.    Unable to reach via telephone so certified letter sent with a message requesting a call back to [phone] between 10 a.m. and 6:30 p.m., 7 days a week for patient's ED/UC lab results.   Lab result:  Final Urine Culture Report on 1/12/18.  Emergency Dept/Urgent Care discharge antibiotic prescribed: Cephalexin (Keflex) 500 mg capsule, 1 capsule (500 mg) by mouth 2 times daily for 10 days.  Bacteria #1: 50,000 to 100,000 colonies/ml Escherichia coli is [SUSCEPTIBLE] to antibiotic  Bacteria #2: 10,000 to 50,000 colonies/ml Strain 2 Escherichia coli is [INTERMEDIATE SUSCEPTIBLE] to antibiotic  Recommendations in treatment per  ED Lab result protocol..      Meghann Cooper RN  [facility] RN  Lung Nodule and ED Lab Result RN  Epic pool (ED late result f/u RN): [id number]   INCIDENTAL RADIOLOGY F/U NURSES: P [id number]  # [phone]

## 2019-01-18 RX ORDER — CEFDINIR 300 MG/1
300 CAPSULE ORAL 2 TIMES DAILY
Qty: 14 CAPSULE | Refills: 0 | Status: SHIPPED | OUTPATIENT
Start: 2019-01-18 | End: 2019-03-13

## 2019-01-18 NOTE — TELEPHONE ENCOUNTER
"[facility] Emergency Department Lab result notification     Patient/parent Name  Olesya Cintron    RN Assessment (Patient s current Symptoms), include time  called.  [Insert Left message here if message left]  Olesya feeling \"better\", still has a \"back ache\".  Is still on Keflex.    Lab result (if applicable):  Final Urine Culture Report on 1/12/18.  Emergency Dept/Urgent Care discharge antibiotic prescribed: Cephalexin (Keflex) 500 mg capsule, 1 capsule (500 mg) by mouth 2 times daily for 10 days.  Bacteria #1: 50,000 to 100,000 colonies/ml Escherichia coli is [SUSCEPTIBLE] to antibiotic  Bacteria #2: 10,000 to 50,000 colonies/ml Strain 2 Escherichia coli is [INTERMEDIATE SUSCEPTIBLE] to antibiotic  Recommendations in treatment per  ED Lab result protocol..    RN Recommendations/Instructions per [place] ED lab result protocol  To stop Keflex, start Cefdinir 300 mg PO BID x 7 days per protocol.  Med ordered to [facility] pharmacy per request.     Please Contact your PCP clinic or return to the Emergency department if your:    Symptoms return.    Symptoms do not resolve after completing antibiotic.    Symptoms worsen or other concerning symptom's.    PCP follow-up Questions asked: YES       Yvrose Bella RN    [facility] RN  Lung Nodule and ED Lab Results F/U RN  Epic pool (ED late result f/u RN) : P [id number]   # [phone]  "

## 2019-03-13 ENCOUNTER — OFFICE VISIT (OUTPATIENT)
Dept: FAMILY MEDICINE | Facility: CLINIC | Age: 44
End: 2019-03-13
Payer: COMMERCIAL

## 2019-03-13 VITALS
SYSTOLIC BLOOD PRESSURE: 118 MMHG | HEART RATE: 79 BPM | TEMPERATURE: 99 F | WEIGHT: 148.8 LBS | OXYGEN SATURATION: 97 % | DIASTOLIC BLOOD PRESSURE: 84 MMHG | HEIGHT: 67 IN | RESPIRATION RATE: 16 BRPM | BODY MASS INDEX: 23.35 KG/M2

## 2019-03-13 DIAGNOSIS — J01.01 ACUTE RECURRENT MAXILLARY SINUSITIS: Primary | ICD-10-CM

## 2019-03-13 DIAGNOSIS — N30.00 ACUTE CYSTITIS WITHOUT HEMATURIA: ICD-10-CM

## 2019-03-13 DIAGNOSIS — R30.0 DYSURIA: ICD-10-CM

## 2019-03-13 DIAGNOSIS — R82.90 NONSPECIFIC FINDING ON EXAMINATION OF URINE: ICD-10-CM

## 2019-03-13 LAB
ALBUMIN UR-MCNC: NEGATIVE MG/DL
APPEARANCE UR: ABNORMAL
BACTERIA #/AREA URNS HPF: ABNORMAL /HPF
BILIRUB UR QL STRIP: NEGATIVE
COLOR UR AUTO: YELLOW
GLUCOSE UR STRIP-MCNC: NEGATIVE MG/DL
HGB UR QL STRIP: ABNORMAL
KETONES UR STRIP-MCNC: NEGATIVE MG/DL
LEUKOCYTE ESTERASE UR QL STRIP: ABNORMAL
NITRATE UR QL: NEGATIVE
NON-SQ EPI CELLS #/AREA URNS LPF: ABNORMAL /LPF
PH UR STRIP: 6.5 PH (ref 5–7)
RBC #/AREA URNS AUTO: ABNORMAL /HPF
SOURCE: ABNORMAL
SP GR UR STRIP: <=1.005 (ref 1–1.03)
UROBILINOGEN UR STRIP-ACNC: 0.2 EU/DL (ref 0.2–1)
WBC #/AREA URNS AUTO: ABNORMAL /HPF

## 2019-03-13 PROCEDURE — 99214 OFFICE O/P EST MOD 30 MIN: CPT | Performed by: INTERNAL MEDICINE

## 2019-03-13 PROCEDURE — 87186 SC STD MICRODIL/AGAR DIL: CPT | Performed by: INTERNAL MEDICINE

## 2019-03-13 PROCEDURE — 87088 URINE BACTERIA CULTURE: CPT | Performed by: INTERNAL MEDICINE

## 2019-03-13 PROCEDURE — 81001 URINALYSIS AUTO W/SCOPE: CPT | Performed by: INTERNAL MEDICINE

## 2019-03-13 PROCEDURE — 87086 URINE CULTURE/COLONY COUNT: CPT | Performed by: INTERNAL MEDICINE

## 2019-03-13 RX ORDER — METHYLPHENIDATE HYDROCHLORIDE 20 MG/1
20 TABLET ORAL 2 TIMES DAILY
COMMUNITY
End: 2021-01-28

## 2019-03-13 RX ORDER — CEFUROXIME AXETIL 500 MG/1
500 TABLET ORAL 2 TIMES DAILY
Qty: 20 TABLET | Refills: 0 | Status: SHIPPED | OUTPATIENT
Start: 2019-03-13 | End: 2019-06-25

## 2019-03-13 ASSESSMENT — MIFFLIN-ST. JEOR: SCORE: 1349.64

## 2019-03-13 NOTE — PROGRESS NOTES
SUBJECTIVE:   Olesya Cintron is a 43 year old female who presents to clinic today for the following health issues:    ENT Symptoms             Symptoms: cc Present Absent Comment   Fever/Chills   x    Fatigue  x     Muscle Aches   x    Eye Irritation   x    Sneezing   x    Nasal Amandeep/Drg   x    Sinus Pressure/Pain  x  On and off on the left   Loss of smell   x    Dental pain  x  Left side- saw dentist, thought may sinus related   Sore Throat  x  On and off   Swollen Glands  x     Ear Pain/Fullness  x  Left ear pain, ringing and buzzing   Cough   x    Wheeze   x    Chest Pain   x    Shortness of breath   x    Rash   x    Other  x  UTI sx- painful urination, feeling like has to go all the time and urgency x5 days.  No hematuria  Low back aches on both side     Symptom duration:  x1 week   Symptom severity:  moderate   Treatments tried:  netti pot, cutting back on sugars   Contacts:  none       Olesya was treated for UTI in January. Culture grew ampicillin and Bactrim resistant E coli.  She was treated with cefdinir.  Symptoms improved until recently.      She states she has sinus issues every 1-2 years .  She is having daily neck pain for the past 8 months.        Problem list and histories reviewed & adjusted, as indicated.  Additional history: as documented    Patient Active Problem List   Diagnosis     History of urinary tract infection     Other alopecia     CARDIOVASCULAR SCREENING; LDL GOAL LESS THAN 160     Pelvic pain in female     Elevated blood pressure     Vulvar vestibulitis     BV (bacterial vaginosis)     ADHD (attention deficit hyperactivity disorder)     Allergic rhinitis     Recurrent urinary tract infection     Past Surgical History:   Procedure Laterality Date     HC COLONOSCOPY THRU STOMA, DIAGNOSTIC  8/07    normal     HC REMOVE TONSILS/ADENOIDS,11+ Y/O       LEEP TX, CERVICAL  1995     SURGICAL HISTORY OF -   age 6    Hydrodistention of bladder     SURGICAL HISTORY OF -       Local excision of  skin precancer- foot and ear     UPPER GI ENDOSCOPY  8/07    H-pylori       Social History     Tobacco Use     Smoking status: Former Smoker     Smokeless tobacco: Never Used   Substance Use Topics     Alcohol use: Yes     Comment: occas-      Family History   Problem Relation Age of Onset     Thyroid Disease Mother      Hypertension Mother      Lipids Mother      Lipids Father      Hypertension Father      Heart Disease Father         bypass surgery     Gastrointestinal Disease Father         ulcer     Prostate Cancer Paternal Grandfather      Alzheimer Disease Paternal Grandmother      Cardiovascular Maternal Grandfather         MI     Diabetes Maternal Grandfather      Breast Cancer No family hx of      Colon Cancer No family hx of          Current Outpatient Medications   Medication Sig Dispense Refill     albuterol (PROAIR HFA/PROVENTIL HFA/VENTOLIN HFA) 108 (90 Base) MCG/ACT Inhaler Inhale 2 puffs into the lungs every 6 hours as needed for shortness of breath / dyspnea or wheezing 1 Inhaler 3     cyclobenzaprine (FLEXERIL) 10 MG tablet Take 0.5-1 tablets (5-10 mg) by mouth 3 times daily as needed for muscle spasms 30 tablet 1     Fish Oil-Cholecalciferol (FISH OIL + D3 PO)        fluticasone (FLONASE) 50 MCG/ACT spray USE ONE TO TWO SPRAYS IN BOTH NOSTRILS ONCE DAILY AS NEEDED FOR RHINITIS OR ALLERGIES 16 g 1     ibuprofen (ADVIL,MOTRIN) 200 MG tablet Take 200 mg by mouth every 4 hours as needed for mild pain       methylphenidate (RITALIN) 20 MG tablet Take 20 mg by mouth 2 times daily       multivitamin, therapeutic with minerals (MULTI-VITAMIN) TABS tablet Take 1 tablet by mouth daily       norgestimate-ethinyl estradiol (MONO-LINYAH) 0.25-35 MG-MCG per tablet Take 1 tablet by mouth daily 84 tablet 4     traZODone (DESYREL) 25 MG TABS Take 50 mg by mouth At Bedtime 1 to 1 1/2 tablets at bedtime       VITAMIN D, CHOLECALCIFEROL, PO Take by mouth daily       albuterol (2.5 MG/3ML) 0.083% nebulizer solution  "Take 1 vial (2.5 mg) by nebulization every 6 hours as needed for shortness of breath / dyspnea or wheezing (Patient not taking: Reported on 3/13/2019) 30 vial 1     omeprazole (PRILOSEC) 20 MG CR capsule Take 1 capsule (20 mg) by mouth daily (Patient not taking: Reported on 3/13/2019) 90 capsule 3     order for DME Equipment being ordered: Valved chamber spacer, use with inhaler. 1 Device 0     order for DME Nebulizer 1 Units 0     Allergies   Allergen Reactions     Ciprofloxacin Nausea and Vomiting     Penicillin [Penicillins]      Hives         Reviewed and updated as needed this visit by clinical staff       Reviewed and updated as needed this visit by Provider         ROS:  Constitutional, HEENT, pulmonary, gu systems are negative, except as otherwise noted.    OBJECTIVE:     /84 (BP Location: Right arm, Patient Position: Sitting, Cuff Size: Adult Regular)   Pulse 79   Temp 99  F (37.2  C) (Tympanic)   Resp 16   Ht 1.689 m (5' 6.5\")   Wt 67.5 kg (148 lb 12.8 oz)   SpO2 97%   BMI 23.66 kg/m    Body mass index is 23.66 kg/m .    GENERAL: alert and no distress  EYES: Eyes grossly normal to inspection, PERRL and conjunctivae and sclerae normal  HENT: ear canals and TMs normal, left maxillary sinuses is tender to percussion, nose and mouth without ulcers or lesions, oropharynx normal  NECK: no adenopathy, no asymmetry, masses, or scars  RESP: lungs clear to auscultation - no rales, rhonchi or wheezes  CV: regular rate and rhythm, normal S1 S2, no S3 or S4, no murmur, click or rub  ABDOMEN: soft, slight pressure sensation with palpation of bladder, no hepatosplenomegaly, no masses and bowel sounds normal  BACK: no CVA tenderness     Diagnostic Test Results:  Results for orders placed or performed in visit on 03/13/19 (from the past 24 hour(s))   *UA reflex to Microscopic and Culture ([place] and [facility] (except [place] and [place])   Result Value Ref Range    Color Urine Yellow     Appearance " Urine Slightly Cloudy     Glucose Urine Negative NEG^Negative mg/dL    Bilirubin Urine Negative NEG^Negative    Ketones Urine Negative NEG^Negative mg/dL    Specific Gravity Urine <=1.005 1.003 - 1.035    Blood Urine Small (A) NEG^Negative    pH Urine 6.5 5.0 - 7.0 pH    Protein Albumin Urine Negative NEG^Negative mg/dL    Urobilinogen Urine 0.2 0.2 - 1.0 EU/dL    Nitrite Urine Negative NEG^Negative    Leukocyte Esterase Urine Large (A) NEG^Negative    Source Midstream Urine    Urine Microscopic   Result Value Ref Range    WBC Urine  (A) OTO5^0 - 5 /HPF    RBC Urine 5-10 (A) OTO2^O - 2 /HPF    Squamous Epithelial /LPF Urine Few FEW^Few /LPF    Bacteria Urine Few (A) NEG^Negative /HPF   Urine Culture Aerobic Bacterial   Result Value Ref Range    Specimen Description Midstream Urine     Special Requests Specimen received in preservative     Culture Micro PENDING        ASSESSMENT/PLAN:       Olesya presents with focal left maxillary tenderness on percussion suggestive of sinusitis.  She did recently see the dentist as well with no obvious tooth infection noted, though they did suggest potentially seeing a specialist for imaging if this didn't seem to be due to sinusitis.  She has had recurrent sinus issues, so it seems reasonable to proceed with sinus CT scan at this time.  UA and symptoms are also suggestive of UTI.  Previous urine culture grew E coli resistant to ampicillin and Bactrim and she has Cipro and penicillin allergies.  We will therefore start Ceftin with the hope this will cover both the sinusitis and UTI.  Will follow-up on urine culture.  Follow-up as needed if not improving in a week or new/worsening symptoms develop.       1. Acute recurrent maxillary sinusitis    - cefuroxime (CEFTIN) 500 MG tablet; Take 1 tablet (500 mg) by mouth 2 times daily for 10 days  Dispense: 20 tablet; Refill: 0  - CT Sinus w/o Contrast; Future    2. Acute cystitis without hematuria    - cefuroxime (CEFTIN) 500 MG  tablet; Take 1 tablet (500 mg) by mouth 2 times daily for 10 days  Dispense: 20 tablet; Refill: 0    3. Dysuria    - *UA reflex to Microscopic and Culture ([place] and [facility] (except [place] and [place])  - Urine Microscopic    4. Nonspecific finding on examination of urine    - Urine Culture Aerobic Bacterial      JuanaMeghann Seay MD  [facility] -

## 2019-03-16 LAB
BACTERIA SPEC CULT: ABNORMAL
Lab: ABNORMAL
SPECIMEN SOURCE: ABNORMAL

## 2019-03-19 ENCOUNTER — HOSPITAL ENCOUNTER (OUTPATIENT)
Dept: CT IMAGING | Facility: CLINIC | Age: 44
Discharge: HOME | End: 2019-03-19
Attending: INTERNAL MEDICINE | Admitting: INTERNAL MEDICINE
Payer: COMMERCIAL

## 2019-03-19 DIAGNOSIS — J01.01 ACUTE RECURRENT MAXILLARY SINUSITIS: ICD-10-CM

## 2019-03-19 PROCEDURE — 70486 CT MAXILLOFACIAL W/O DYE: CPT

## 2019-03-22 ENCOUNTER — VIRTUAL VISIT (OUTPATIENT)
Dept: FAMILY MEDICINE | Facility: OTHER | Age: 44
End: 2019-03-22

## 2019-03-22 ENCOUNTER — MYC MEDICAL ADVICE (OUTPATIENT)
Dept: FAMILY MEDICINE | Facility: CLINIC | Age: 44
End: 2019-03-22

## 2019-03-22 NOTE — PROGRESS NOTES
"Date: [id number]  Clinician: Best Doty  Clinician NPI: [id number]  Patient: Olesya Cintron  Patient : 1975  Patient Address: [address] [place] [address] [place] [address]  Patient Phone: [phone]  Visit Protocol: Yeast infection  Patient Summary:  Olesya is a 43 year old ( : 1975 ) female who initiated a Visit for a presumed vaginal yeast infection. When asked the question \"Please sign me up to receive news, health information and promotions from [facility].\", Olesya responded \"No\".    Olesya began noticing vaginal discharge, vaginal pruritus, and perivulvar pruritus 0-5 days ago. She has a more than normal amount of thick, clear or white, non-odorous, chunky (like cottage cheese) discharge.   She denies having perivulvar rash, open sores, and abdominal pain. She also denies feeling feverish.   Olesya has a history of vaginal yeast infections. She has had zero (0) occurrences in the past year and the current symptoms are similar to previous yeast infections. She is not sure if she has used fluconazole (Diflucan) to treat previous yeast infections.   She has attempted to treat her symptoms with anti-fungal cream for yeast infections and hydrocortisone cream and has used the anti-fungal medication as directed. Anti-fungal medication used to treat symptoms as reported by the patient (free text): Monistat 3 and tea tree and lavendar essential oils   She prefers a fluconazole (Diflucan) Pill.   She is currently taking or has taken antibiotics in the past 2 weeks. She denies having a sexually transmitted disease.   She denies pregnancy and denies breastfeeding. She has menstruated in the past month.   She does NOT smoke or use smokeless tobacco.    MEDICATIONS: cefuroxime axetil oral, Sprintec (28) oral, Fish Oil oral, Complete Multivitamin-Multimineral oral, methylphenidate HCl oral, trazodone oral, ALLERGIES: Penicillins  Clinician Response:  Dear Olesya,  Based on the information you have provided, you likely have a " vaginal yeast infection which is a common infection of the vagina caused by a fungus.  I am prescribing:   Fluconazole (Diflucan) 150 mg oral tablet. Take 1 tablet by mouth in a single dose. There are no refills with this prescription.  While you have yeast infection symptoms, do the following:      Avoid irritants such as scented bath products, tampons, pads, or vaginal sprays and powders.    Avoid douching.    Wear cotton underwear and if you are comfortable doing so, do not wear underwear to bed.    Avoid hot tubs and whirlpool spas.     Symptoms should improve with 1-2 days of treatment and resolve entirely in 5-7 days. However, there are other types of vaginal infections that have some of the same symptoms as a yeast infection. For this reason, please be seen in person if symptoms have not improved after 3 days or resolved after 10 days.   Diagnosis: Candida vulvovaginitis  Diagnosis ICD: B37.3  Prescription: fluconazole (Diflucan) 150 mg oral tablet 1 tablet, 1 days supply. Take 1 tablet by mouth in a single dose. Refills: 0, Refill as needed: no, Allow substitutions: yes  Pharmacy: [id number] - [phone] - [address] [place] [address] [place] [address]

## 2019-06-11 DIAGNOSIS — N39.0 RECURRENT URINARY TRACT INFECTION: ICD-10-CM

## 2019-06-11 NOTE — TELEPHONE ENCOUNTER
Brittni DAVIS      Last Written Prescription Date:  5/10/18 discontinued 3/13/19  Last Fill Quantity: 20,   # refills: 5  Last Office Visit: 3/13/19  Future Office visit:       Routing refill request to provider for review/approval because:  Drug not on the [facility], [facility] or [facility] refill protocol or controlled substance  Drug not active on patient's medication list    Patient reports she takes this medication following intercourse, patient reports symptoms started Saturday, burning with urination, increased frequency to urinate.   Patient advised to discuss plan of care with PCP, scheduled for physical 6/25/19 and patient would like to discuss then. Does patient need to be seen now for chronic UTI's or ok to refill 1 time?    Pended medication.     Provider please review and advise. Thank you.

## 2019-06-11 NOTE — TELEPHONE ENCOUNTER
Patient is looking to refill her Sulfa DS. Please call patient with questions.  Thanks,   Leeanne Nascimento  Certified Pharmacy Technician  [facility]  [phone]

## 2019-06-12 RX ORDER — SULFAMETHOXAZOLE/TRIMETHOPRIM 800-160 MG
1 TABLET ORAL DAILY PRN
Qty: 20 TABLET | Refills: 0 | Status: SHIPPED | OUTPATIENT
Start: 2019-06-12 | End: 2019-06-25

## 2019-06-18 DIAGNOSIS — Z30.41 ENCOUNTER FOR SURVEILLANCE OF CONTRACEPTIVE PILLS: ICD-10-CM

## 2019-06-21 ENCOUNTER — ALLIED HEALTH/NURSE VISIT (OUTPATIENT)
Dept: FAMILY MEDICINE | Facility: CLINIC | Age: 44
End: 2019-06-21
Payer: COMMERCIAL

## 2019-06-21 DIAGNOSIS — Z00.00 HEALTHCARE MAINTENANCE: Primary | ICD-10-CM

## 2019-06-21 PROCEDURE — 99207 ZZC NO CHARGE NURSE ONLY: CPT

## 2019-06-21 RX ORDER — NORGESTIMATE AND ETHINYL ESTRADIOL 0.25-0.035
1 KIT ORAL DAILY
Qty: 28 TABLET | Refills: 0 | Status: SHIPPED | OUTPATIENT
Start: 2019-06-21 | End: 2019-06-25

## 2019-06-21 NOTE — PROGRESS NOTES
Patient needing 1 month supply of oral contraceptive med. It appears this was routed straight to Dr. Singh after he was out of office. Sent 1 month supply as patient is due for recheck.     Patient states understanding and will keep appointment with PCP.      GALA MoellerN, RN

## 2019-06-21 NOTE — TELEPHONE ENCOUNTER
"Requested Prescriptions   Pending Prescriptions Disp Refills     norgestimate-ethinyl estradiol (MONO-LINYAH) 0.25-35 MG-MCG tablet 84 tablet 4     Sig: Take 1 tablet by mouth daily       Contraceptives Protocol Passed - 6/21/2019  1:50 PM        Passed - Patient is not a current smoker if age is 35 or older        Passed - Recent (12 mo) or future (30 days) visit within the authorizing provider's specialty     Patient had office visit in the last 12 months or has a visit in the next 30 days with authorizing provider or within the authorizing provider's specialty.  See \"Patient Info\" tab in inbasket, or \"Choose Columns\" in Meds & Orders section of the refill encounter.              Passed - Medication is active on med list        Passed - No active pregnancy on record        Passed - No positive pregnancy test in past 12 months          "

## 2019-06-25 ENCOUNTER — OFFICE VISIT (OUTPATIENT)
Dept: FAMILY MEDICINE | Facility: CLINIC | Age: 44
End: 2019-06-25
Payer: COMMERCIAL

## 2019-06-25 VITALS
OXYGEN SATURATION: 97 % | RESPIRATION RATE: 16 BRPM | TEMPERATURE: 98.6 F | SYSTOLIC BLOOD PRESSURE: 120 MMHG | WEIGHT: 147 LBS | DIASTOLIC BLOOD PRESSURE: 88 MMHG | BODY MASS INDEX: 23.07 KG/M2 | HEIGHT: 67 IN | HEART RATE: 77 BPM

## 2019-06-25 DIAGNOSIS — F90.9 ATTENTION DEFICIT HYPERACTIVITY DISORDER (ADHD), UNSPECIFIED ADHD TYPE: ICD-10-CM

## 2019-06-25 DIAGNOSIS — R68.84 MANDIBULAR PAIN: ICD-10-CM

## 2019-06-25 DIAGNOSIS — Z13.6 CARDIOVASCULAR SCREENING; LDL GOAL LESS THAN 160: ICD-10-CM

## 2019-06-25 DIAGNOSIS — R51.9 NONINTRACTABLE EPISODIC HEADACHE, UNSPECIFIED HEADACHE TYPE: ICD-10-CM

## 2019-06-25 DIAGNOSIS — Z12.4 SCREENING FOR MALIGNANT NEOPLASM OF CERVIX: ICD-10-CM

## 2019-06-25 DIAGNOSIS — Z00.00 ROUTINE GENERAL MEDICAL EXAMINATION AT A HEALTH CARE FACILITY: Primary | ICD-10-CM

## 2019-06-25 DIAGNOSIS — Z30.41 ENCOUNTER FOR SURVEILLANCE OF CONTRACEPTIVE PILLS: ICD-10-CM

## 2019-06-25 DIAGNOSIS — Z12.31 ENCOUNTER FOR SCREENING MAMMOGRAM FOR BREAST CANCER: ICD-10-CM

## 2019-06-25 PROCEDURE — G0145 SCR C/V CYTO,THINLAYER,RESCR: HCPCS | Performed by: NURSE PRACTITIONER

## 2019-06-25 PROCEDURE — 99396 PREV VISIT EST AGE 40-64: CPT | Performed by: NURSE PRACTITIONER

## 2019-06-25 PROCEDURE — 87624 HPV HI-RISK TYP POOLED RSLT: CPT | Performed by: NURSE PRACTITIONER

## 2019-06-25 RX ORDER — DEXTROAMPHETAMINE SACCHARATE, AMPHETAMINE ASPARTATE MONOHYDRATE, DEXTROAMPHETAMINE SULFATE AND AMPHETAMINE SULFATE 5; 5; 5; 5 MG/1; MG/1; MG/1; MG/1
20 CAPSULE, EXTENDED RELEASE ORAL DAILY
COMMUNITY

## 2019-06-25 RX ORDER — NORGESTIMATE AND ETHINYL ESTRADIOL 0.25-0.035
1 KIT ORAL DAILY
Qty: 84 TABLET | Refills: 3 | Status: SHIPPED | OUTPATIENT
Start: 2019-06-25 | End: 2020-05-20

## 2019-06-25 ASSESSMENT — MIFFLIN-ST. JEOR: SCORE: 1336.48
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
## 2019-06-25 NOTE — NURSING NOTE
"Initial /88   Pulse 77   Temp 98.6  F (37  C) (Tympanic)   Resp 16   Ht 1.689 m (5' 6.5\")   Wt 66.7 kg (147 lb)   SpO2 97%   BMI 23.37 kg/m   Estimated body mass index is 23.37 kg/m  as calculated from the following:    Height as of this encounter: 1.689 m (5' 6.5\").    Weight as of this encounter: 66.7 kg (147 lb). .    Mariya Hinds CMA ([facility])  "

## 2019-06-27 LAB
COPATH REPORT: NORMAL
PAP: NORMAL

## 2019-07-01 LAB
FINAL DIAGNOSIS: NORMAL
HPV HR 12 DNA CVX QL NAA+PROBE: NEGATIVE
HPV16 DNA SPEC QL NAA+PROBE: NEGATIVE
HPV18 DNA SPEC QL NAA+PROBE: NEGATIVE
SPECIMEN DESCRIPTION: NORMAL
SPECIMEN SOURCE CVX/VAG CYTO: NORMAL

## 2019-07-14 ENCOUNTER — APPOINTMENT (OUTPATIENT)
Dept: GENERAL RADIOLOGY | Facility: CLINIC | Age: 44
End: 2019-07-14
Attending: FAMILY MEDICINE
Payer: COMMERCIAL

## 2019-07-14 ENCOUNTER — HOSPITAL ENCOUNTER (EMERGENCY)
Facility: CLINIC | Age: 44
Discharge: HOME | End: 2019-07-15
Attending: FAMILY MEDICINE | Admitting: FAMILY MEDICINE
Payer: COMMERCIAL

## 2019-07-14 ENCOUNTER — ANESTHESIA EVENT (OUTPATIENT)
Dept: EMERGENCY MEDICINE | Facility: CLINIC | Age: 44
End: 2019-07-14
Payer: COMMERCIAL

## 2019-07-14 ENCOUNTER — APPOINTMENT (OUTPATIENT)
Dept: CT IMAGING | Facility: CLINIC | Age: 44
End: 2019-07-14
Attending: FAMILY MEDICINE
Payer: COMMERCIAL

## 2019-07-14 ENCOUNTER — ANESTHESIA (OUTPATIENT)
Dept: EMERGENCY MEDICINE | Facility: CLINIC | Age: 44
End: 2019-07-14
Payer: COMMERCIAL

## 2019-07-14 DIAGNOSIS — R51.9 NONINTRACTABLE HEADACHE, UNSPECIFIED CHRONICITY PATTERN, UNSPECIFIED HEADACHE TYPE: ICD-10-CM

## 2019-07-14 DIAGNOSIS — A69.20 LYME DISEASE: Primary | ICD-10-CM

## 2019-07-14 DIAGNOSIS — R50.9 FEVER, UNSPECIFIED FEVER CAUSE: ICD-10-CM

## 2019-07-14 LAB
ALBUMIN SERPL-MCNC: 3.3 G/DL (ref 3.4–5)
ALBUMIN UR-MCNC: 30 MG/DL
ALP SERPL-CCNC: 87 U/L (ref 40–150)
ALT SERPL W P-5'-P-CCNC: 42 U/L (ref 0–50)
ANION GAP SERPL CALCULATED.3IONS-SCNC: 7 MMOL/L (ref 3–14)
APPEARANCE CSF: CLEAR
APPEARANCE UR: ABNORMAL
AST SERPL W P-5'-P-CCNC: 35 U/L (ref 0–45)
BACTERIA #/AREA URNS HPF: ABNORMAL /HPF
BASOPHILS # BLD AUTO: 0 10E9/L (ref 0–0.2)
BASOPHILS NFR BLD AUTO: 0.3 %
BILIRUB SERPL-MCNC: 0.2 MG/DL (ref 0.2–1.3)
BILIRUB UR QL STRIP: NEGATIVE
BUN SERPL-MCNC: 8 MG/DL (ref 7–30)
CALCIUM SERPL-MCNC: 8.8 MG/DL (ref 8.5–10.1)
CHLORIDE SERPL-SCNC: 102 MMOL/L (ref 94–109)
CO2 SERPL-SCNC: 26 MMOL/L (ref 20–32)
COLOR CSF: COLORLESS
COLOR UR AUTO: YELLOW
CREAT SERPL-MCNC: 0.82 MG/DL (ref 0.52–1.04)
DEPRECATED S PYO AG THROAT QL EIA: NORMAL
DIFFERENTIAL METHOD BLD: NORMAL
EOSINOPHIL # BLD AUTO: 0 10E9/L (ref 0–0.7)
EOSINOPHIL NFR BLD AUTO: 0 %
ERYTHROCYTE [DISTWIDTH] IN BLOOD BY AUTOMATED COUNT: 11.6 % (ref 10–15)
GFR SERPL CREATININE-BSD FRML MDRD: 86 ML/MIN/{1.73_M2}
GLUCOSE CSF-MCNC: 67 MG/DL (ref 40–70)
GLUCOSE SERPL-MCNC: 104 MG/DL (ref 70–99)
GLUCOSE UR STRIP-MCNC: NEGATIVE MG/DL
HCG SERPL QL: NEGATIVE
HCT VFR BLD AUTO: 38.2 % (ref 35–47)
HETEROPH AB SER QL: NEGATIVE
HGB BLD-MCNC: 12.8 G/DL (ref 11.7–15.7)
HGB UR QL STRIP: ABNORMAL
HYALINE CASTS #/AREA URNS LPF: 1 /LPF (ref 0–2)
IMM GRANULOCYTES # BLD: 0 10E9/L (ref 0–0.4)
IMM GRANULOCYTES NFR BLD: 0.3 %
KETONES UR STRIP-MCNC: NEGATIVE MG/DL
LACTATE BLD-SCNC: 1.5 MMOL/L (ref 0.7–2)
LEUKOCYTE ESTERASE UR QL STRIP: ABNORMAL
LYMPHOCYTES # BLD AUTO: 0.9 10E9/L (ref 0.8–5.3)
LYMPHOCYTES NFR BLD AUTO: 13.5 %
MCH RBC QN AUTO: 30.4 PG (ref 26.5–33)
MCHC RBC AUTO-ENTMCNC: 33.5 G/DL (ref 31.5–36.5)
MCV RBC AUTO: 91 FL (ref 78–100)
MONOCYTES # BLD AUTO: 0.3 10E9/L (ref 0–1.3)
MONOCYTES NFR BLD AUTO: 5.3 %
MUCOUS THREADS #/AREA URNS LPF: PRESENT /LPF
NEUTROPHILS # BLD AUTO: 5.2 10E9/L (ref 1.6–8.3)
NEUTROPHILS NFR BLD AUTO: 80.6 %
NITRATE UR QL: NEGATIVE
NRBC # BLD AUTO: 0 10*3/UL
NRBC BLD AUTO-RTO: 0 /100
PH UR STRIP: 5 PH (ref 5–7)
PLATELET # BLD AUTO: 212 10E9/L (ref 150–450)
POTASSIUM SERPL-SCNC: 3.4 MMOL/L (ref 3.4–5.3)
PROT CSF-MCNC: 31 MG/DL (ref 15–60)
PROT SERPL-MCNC: 7.9 G/DL (ref 6.8–8.8)
RBC # BLD AUTO: 4.21 10E12/L (ref 3.8–5.2)
RBC # CSF MANUAL: 0 /UL (ref 0–2)
RBC #/AREA URNS AUTO: 2 /HPF (ref 0–2)
SODIUM SERPL-SCNC: 135 MMOL/L (ref 133–144)
SOURCE: ABNORMAL
SP GR UR STRIP: 1.02 (ref 1–1.03)
SPECIMEN SOURCE: NORMAL
SPECIMEN VOL CSF: 4 ML
SQUAMOUS #/AREA URNS AUTO: 6 /HPF (ref 0–1)
TUBE # CSF: 4 #
UROBILINOGEN UR STRIP-MCNC: 0 MG/DL (ref 0–2)
WBC # BLD AUTO: 6.5 10E9/L (ref 4–11)
WBC # CSF MANUAL: 1 /UL (ref 0–5)
WBC #/AREA URNS AUTO: 15 /HPF (ref 0–5)

## 2019-07-14 PROCEDURE — 37000011 ZZH ANESTHESIA WARD SERVICE

## 2019-07-14 PROCEDURE — 80053 COMPREHEN METABOLIC PANEL: CPT | Performed by: FAMILY MEDICINE

## 2019-07-14 PROCEDURE — 93005 ELECTROCARDIOGRAM TRACING: CPT | Performed by: FAMILY MEDICINE

## 2019-07-14 PROCEDURE — 70486 CT MAXILLOFACIAL W/O DYE: CPT

## 2019-07-14 PROCEDURE — 99285 EMERGENCY DEPT VISIT HI MDM: CPT | Mod: 25 | Performed by: FAMILY MEDICINE

## 2019-07-14 PROCEDURE — 96374 THER/PROPH/DIAG INJ IV PUSH: CPT | Performed by: FAMILY MEDICINE

## 2019-07-14 PROCEDURE — 87040 BLOOD CULTURE FOR BACTERIA: CPT | Performed by: FAMILY MEDICINE

## 2019-07-14 PROCEDURE — 71046 X-RAY EXAM CHEST 2 VIEWS: CPT

## 2019-07-14 PROCEDURE — 87070 CULTURE OTHR SPECIMN AEROBIC: CPT | Performed by: FAMILY MEDICINE

## 2019-07-14 PROCEDURE — 86618 LYME DISEASE ANTIBODY: CPT | Performed by: FAMILY MEDICINE

## 2019-07-14 PROCEDURE — 25000128 H RX IP 250 OP 636: Performed by: FAMILY MEDICINE

## 2019-07-14 PROCEDURE — 84703 CHORIONIC GONADOTROPIN ASSAY: CPT | Performed by: FAMILY MEDICINE

## 2019-07-14 PROCEDURE — 87015 SPECIMEN INFECT AGNT CONCNTJ: CPT | Performed by: FAMILY MEDICINE

## 2019-07-14 PROCEDURE — 87086 URINE CULTURE/COLONY COUNT: CPT | Performed by: FAMILY MEDICINE

## 2019-07-14 PROCEDURE — 89050 BODY FLUID CELL COUNT: CPT | Performed by: FAMILY MEDICINE

## 2019-07-14 PROCEDURE — 87207 SMEAR SPECIAL STAIN: CPT | Performed by: FAMILY MEDICINE

## 2019-07-14 PROCEDURE — 25000125 ZZHC RX 250: Performed by: NURSE ANESTHETIST, CERTIFIED REGISTERED

## 2019-07-14 PROCEDURE — 82945 GLUCOSE OTHER FLUID: CPT | Performed by: FAMILY MEDICINE

## 2019-07-14 PROCEDURE — 25000125 ZZHC RX 250: Performed by: FAMILY MEDICINE

## 2019-07-14 PROCEDURE — 87880 STREP A ASSAY W/OPTIC: CPT | Performed by: FAMILY MEDICINE

## 2019-07-14 PROCEDURE — 96361 HYDRATE IV INFUSION ADD-ON: CPT | Performed by: FAMILY MEDICINE

## 2019-07-14 PROCEDURE — 93010 ELECTROCARDIOGRAM REPORT: CPT | Mod: Z6 | Performed by: FAMILY MEDICINE

## 2019-07-14 PROCEDURE — 36415 COLL VENOUS BLD VENIPUNCTURE: CPT | Performed by: FAMILY MEDICINE

## 2019-07-14 PROCEDURE — 81001 URINALYSIS AUTO W/SCOPE: CPT | Performed by: FAMILY MEDICINE

## 2019-07-14 PROCEDURE — 86308 HETEROPHILE ANTIBODY SCREEN: CPT | Performed by: FAMILY MEDICINE

## 2019-07-14 PROCEDURE — 84157 ASSAY OF PROTEIN OTHER: CPT | Performed by: FAMILY MEDICINE

## 2019-07-14 PROCEDURE — 87205 SMEAR GRAM STAIN: CPT | Performed by: FAMILY MEDICINE

## 2019-07-14 PROCEDURE — 40000671 ZZH STATISTIC ANESTHESIA CASE

## 2019-07-14 PROCEDURE — 87081 CULTURE SCREEN ONLY: CPT | Performed by: FAMILY MEDICINE

## 2019-07-14 PROCEDURE — 85025 COMPLETE CBC W/AUTO DIFF WBC: CPT | Performed by: FAMILY MEDICINE

## 2019-07-14 PROCEDURE — 86617 LYME DISEASE ANTIBODY: CPT | Performed by: FAMILY MEDICINE

## 2019-07-14 PROCEDURE — 96375 TX/PRO/DX INJ NEW DRUG ADDON: CPT | Performed by: FAMILY MEDICINE

## 2019-07-14 PROCEDURE — 87798 DETECT AGENT NOS DNA AMP: CPT | Performed by: FAMILY MEDICINE

## 2019-07-14 PROCEDURE — 83605 ASSAY OF LACTIC ACID: CPT | Performed by: FAMILY MEDICINE

## 2019-07-14 PROCEDURE — 86617 LYME DISEASE ANTIBODY: CPT | Mod: 91 | Performed by: FAMILY MEDICINE

## 2019-07-14 RX ORDER — METOCLOPRAMIDE HYDROCHLORIDE 5 MG/ML
5 INJECTION INTRAMUSCULAR; INTRAVENOUS ONCE
Status: COMPLETED | OUTPATIENT
Start: 2019-07-14 | End: 2019-07-14

## 2019-07-14 RX ORDER — SODIUM CHLORIDE 9 MG/ML
1000 INJECTION, SOLUTION INTRAVENOUS CONTINUOUS
Status: DISCONTINUED | OUTPATIENT
Start: 2019-07-14 | End: 2019-07-15 | Stop reason: HOSPADM

## 2019-07-14 RX ORDER — KETOROLAC TROMETHAMINE 15 MG/ML
15 INJECTION, SOLUTION INTRAMUSCULAR; INTRAVENOUS ONCE
Status: COMPLETED | OUTPATIENT
Start: 2019-07-14 | End: 2019-07-14

## 2019-07-14 RX ORDER — LIDOCAINE HYDROCHLORIDE 10 MG/ML
INJECTION, SOLUTION EPIDURAL; INFILTRATION; INTRACAUDAL; PERINEURAL
Status: COMPLETED
Start: 2019-07-14 | End: 2019-07-14

## 2019-07-14 RX ORDER — OXYMETAZOLINE HYDROCHLORIDE 0.05 G/100ML
2 SPRAY NASAL 2 TIMES DAILY
Status: DISCONTINUED | OUTPATIENT
Start: 2019-07-15 | End: 2019-07-15 | Stop reason: HOSPADM

## 2019-07-14 RX ORDER — LIDOCAINE HYDROCHLORIDE 10 MG/ML
INJECTION, SOLUTION INFILTRATION; PERINEURAL PRN
Status: DISCONTINUED | OUTPATIENT
Start: 2019-07-14 | End: 2019-07-14

## 2019-07-14 RX ORDER — DIPHENHYDRAMINE HYDROCHLORIDE 50 MG/ML
25 INJECTION INTRAMUSCULAR; INTRAVENOUS ONCE
Status: COMPLETED | OUTPATIENT
Start: 2019-07-14 | End: 2019-07-14

## 2019-07-14 RX ADMIN — DIPHENHYDRAMINE HYDROCHLORIDE 25 MG: 50 INJECTION, SOLUTION INTRAMUSCULAR; INTRAVENOUS at 21:09

## 2019-07-14 RX ADMIN — METOCLOPRAMIDE 5 MG: 5 INJECTION, SOLUTION INTRAMUSCULAR; INTRAVENOUS at 22:48

## 2019-07-14 RX ADMIN — Medication 2 SPRAY: at 22:49

## 2019-07-14 RX ADMIN — SODIUM CHLORIDE 1000 ML: 9 INJECTION, SOLUTION INTRAVENOUS at 22:45

## 2019-07-14 RX ADMIN — SODIUM CHLORIDE 1000 ML: 9 INJECTION, SOLUTION INTRAVENOUS at 20:59

## 2019-07-14 RX ADMIN — KETOROLAC TROMETHAMINE 15 MG: 15 INJECTION, SOLUTION INTRAMUSCULAR; INTRAVENOUS at 21:08

## 2019-07-14 RX ADMIN — METOCLOPRAMIDE 5 MG: 5 INJECTION, SOLUTION INTRAMUSCULAR; INTRAVENOUS at 21:11

## 2019-07-14 RX ADMIN — KETOROLAC TROMETHAMINE 15 MG: 15 INJECTION, SOLUTION INTRAMUSCULAR; INTRAVENOUS at 22:46

## 2019-07-14 RX ADMIN — LIDOCAINE HYDROCHLORIDE 30 MG: 10 INJECTION, SOLUTION INFILTRATION; PERINEURAL at 20:15

## 2019-07-14 ASSESSMENT — MIFFLIN-ST. JEOR: SCORE: 1289.99

## 2019-07-14 ASSESSMENT — ENCOUNTER SYMPTOMS
SHORTNESS OF BREATH: 0
NECK PAIN: 1
CHILLS: 0
DYSURIA: 0
CONSTIPATION: 0
SORE THROAT: 0
FEVER: 1
WEAKNESS: 1
WHEEZING: 0
DIAPHORESIS: 0
SINUS PAIN: 1
VOMITING: 0
HEADACHES: 1
PALPITATIONS: 0
DIARRHEA: 0
NAUSEA: 1
FREQUENCY: 0
NECK STIFFNESS: 1
ABDOMINAL PAIN: 0
COUGH: 0
BLOOD IN STOOL: 0
SINUS PRESSURE: 0

## 2019-07-14 NOTE — ED AVS SNAPSHOT
[facility] Emergency Department  [address] [address]  Phone:  [phone]  Fax:  [phone]                                    Olesya Cintron   MRN: [id number]    Department:  [facility] Emergency Department   Date of Visit:  7/14/2019           After Visit Summary Signature Page    I have received my discharge instructions, and my questions have been answered. I have discussed any challenges I see with this plan with the nurse or doctor.    ..........................................................................................................................................  Patient/Patient Representative Signature      ..........................................................................................................................................  Patient Representative Print Name and Relationship to Patient    ..................................................               ................................................  Date                                   Time    ..........................................................................................................................................  Reviewed by Signature/Title    ...................................................              ..............................................  Date                                               Time          22EPIC Rev 08/18

## 2019-07-15 ENCOUNTER — OFFICE VISIT (OUTPATIENT)
Dept: FAMILY MEDICINE | Facility: CLINIC | Age: 44
End: 2019-07-15
Payer: COMMERCIAL

## 2019-07-15 VITALS
WEIGHT: 135 LBS | SYSTOLIC BLOOD PRESSURE: 131 MMHG | OXYGEN SATURATION: 99 % | HEIGHT: 67 IN | DIASTOLIC BLOOD PRESSURE: 73 MMHG | BODY MASS INDEX: 21.19 KG/M2 | RESPIRATION RATE: 18 BRPM | HEART RATE: 101 BPM | TEMPERATURE: 103 F

## 2019-07-15 VITALS
HEIGHT: 67 IN | DIASTOLIC BLOOD PRESSURE: 60 MMHG | WEIGHT: 148 LBS | OXYGEN SATURATION: 98 % | TEMPERATURE: 98.4 F | RESPIRATION RATE: 18 BRPM | SYSTOLIC BLOOD PRESSURE: 102 MMHG | BODY MASS INDEX: 23.23 KG/M2

## 2019-07-15 DIAGNOSIS — A69.20 LYME DISEASE: Primary | ICD-10-CM

## 2019-07-15 LAB
B BURGDOR IGG+IGM SER QL: 0.05 (ref 0–0.89)
GRAM STN SPEC: NORMAL
Lab: NORMAL
PARASITE SPEC INSPECT: NORMAL
SPECIMEN SOURCE: NORMAL
SPECIMEN SOURCE: NORMAL

## 2019-07-15 PROCEDURE — 87040 BLOOD CULTURE FOR BACTERIA: CPT | Performed by: FAMILY MEDICINE

## 2019-07-15 PROCEDURE — 99213 OFFICE O/P EST LOW 20 MIN: CPT | Performed by: INTERNAL MEDICINE

## 2019-07-15 PROCEDURE — 25000132 ZZH RX MED GY IP 250 OP 250 PS 637: Performed by: FAMILY MEDICINE

## 2019-07-15 RX ORDER — DOXYCYCLINE 100 MG/1
100 CAPSULE ORAL ONCE
Status: COMPLETED | OUTPATIENT
Start: 2019-07-15 | End: 2019-07-15

## 2019-07-15 RX ORDER — DOXYCYCLINE 100 MG/1
100 CAPSULE ORAL EVERY 12 HOURS SCHEDULED
Status: DISCONTINUED | OUTPATIENT
Start: 2019-07-15 | End: 2019-07-15 | Stop reason: HOSPADM

## 2019-07-15 RX ORDER — ACETAMINOPHEN 500 MG
1000 TABLET ORAL ONCE
Status: COMPLETED | OUTPATIENT
Start: 2019-07-15 | End: 2019-07-15

## 2019-07-15 RX ORDER — DOXYCYCLINE 100 MG/1
100 CAPSULE ORAL 2 TIMES DAILY
Qty: 28 CAPSULE | Refills: 0 | Status: SHIPPED | OUTPATIENT
Start: 2019-07-15 | End: 2019-08-02

## 2019-07-15 RX ADMIN — DOXYCYCLINE 100 MG: 100 CAPSULE ORAL at 00:43

## 2019-07-15 RX ADMIN — ACETAMINOPHEN 1000 MG: 500 TABLET, FILM COATED ORAL at 00:49

## 2019-07-15 ASSESSMENT — MIFFLIN-ST. JEOR: SCORE: 1348.95

## 2019-07-15 NOTE — ED PROVIDER NOTES
"  History     Chief Complaint   Patient presents with     Fever     Headache     no migraine hx     HPI  Olesya Cintron is a 44 year old female who presents for fever for 4 days - 101 tmax - despite tylenol. chills, head feels as if it is in a vice, jaw pain on opening.  neck pain and stiffness - sammie bilateral trapezius.  generalized weakness.  Nop focal weakness.  no change in mentation.  mild congestion.    frequent sinusitis.  bottom lip numbness/paresthesias bilaterally.  left ear pressure.  no post-nasal drainage.  using tylenol, motrin  No head injury    felt right upper chest pain briefly today  burning chest, no dyspnea    Nausea, no vomiting    Unsure of pregnancy - period late    She also had a tick bite approximately 2 to 3 weeks ago and remove this.  It was embedded and likely there for several days.  Unsure of tick type.  There was no subsequent rash.      Allergies:  Allergies   Allergen Reactions     Ciprofloxacin Nausea and Vomiting     Penicillin [Penicillins]      Hives         Problem List:    Patient Active Problem List    Diagnosis Date Noted     Recurrent urinary tract infection 03/29/2016     Priority: Medium     Allergic rhinitis 10/29/2014     Priority: Medium     Problem list name updated by automated process. Provider to review       ADHD (attention deficit hyperactivity disorder) 07/02/2013     Priority: Medium     Vulvar vestibulitis 06/07/2013     Priority: Medium     Pelvic pain in female 05/22/2012     Priority: Medium     CARDIOVASCULAR SCREENING; LDL GOAL LESS THAN 160 10/31/2010     Priority: Medium     History of urinary tract infection 03/31/2008     Priority: Medium     FREQUENT Q2 month infections, \"documented\" by cx at another facility; txed with hydrodistention at age 6 for frequent UTI  Problem list name updated by automated process. Provider to review       Other alopecia 03/31/2008     Priority: Medium        Past Medical History:    Past Medical History:   Diagnosis Date "     Depression      Eating disorder      Helicobacter pylori (H. pylori)      History of frequent urinary tract infections      MEDICAL HISTORY OF -      Mononucleosis      Personal history of other malignant neoplasm of skin       labor ,     S/P LEEP of cervix        Past Surgical History:    Past Surgical History:   Procedure Laterality Date     HC COLONOSCOPY THRU STOMA, DIAGNOSTIC      normal     HC REMOVE TONSILS/ADENOIDS,11+ Y/O       LEEP TX, CERVICAL       SURGICAL HISTORY OF -   age 6    Hydrodistention of bladder     SURGICAL HISTORY OF -       Local excision of skin precancer- foot and ear     UPPER GI ENDOSCOPY      H-pylori       Family History:    Family History   Problem Relation Age of Onset     Thyroid Disease Mother      Hypertension Mother      Lipids Mother      Lipids Father      Hypertension Father      Heart Disease Father         bypass surgery     Gastrointestinal Disease Father         ulcer     Prostate Cancer Paternal Grandfather      Alzheimer Disease Paternal Grandmother      Cardiovascular Maternal Grandfather         MI     Diabetes Maternal Grandfather      Breast Cancer No family hx of      Colon Cancer No family hx of        Social History:  Marital Status:   [2]  Social History     Tobacco Use     Smoking status: Former Smoker     Smokeless tobacco: Never Used   Substance Use Topics     Alcohol use: Yes     Comment: occas-      Drug use: No        Medications:      albuterol (2.5 MG/3ML) 0.083% nebulizer solution   albuterol (PROAIR HFA/PROVENTIL HFA/VENTOLIN HFA) 108 (90 Base) MCG/ACT Inhaler   amphetamine-dextroamphetamine (ADDERALL XR) 20 MG 24 hr capsule   cyclobenzaprine (FLEXERIL) 10 MG tablet   Fish Oil-Cholecalciferol (FISH OIL + D3 PO)   fluticasone (FLONASE) 50 MCG/ACT spray   ibuprofen (ADVIL,MOTRIN) 200 MG tablet   methylphenidate (RITALIN) 20 MG tablet   multivitamin, therapeutic with minerals (MULTI-VITAMIN) TABS tablet  "  norgestimate-ethinyl estradiol (MONO-LINYAH) 0.25-35 MG-MCG tablet   omeprazole (PRILOSEC) 20 MG CR capsule   order for DME   traZODone (DESYREL) 25 MG TABS   VITAMIN D, CHOLECALCIFEROL, PO         Review of Systems   Constitutional: Positive for fever. Negative for chills and diaphoresis.   HENT: Positive for ear pain and sinus pain. Negative for sinus pressure and sore throat.    Eyes: Negative for visual disturbance.   Respiratory: Negative for cough, shortness of breath and wheezing.    Cardiovascular: Positive for chest pain. Negative for palpitations.   Gastrointestinal: Positive for nausea. Negative for abdominal pain, blood in stool, constipation, diarrhea and vomiting.   Genitourinary: Negative for dysuria, frequency and urgency.   Musculoskeletal: Positive for neck pain and neck stiffness.   Skin: Negative for rash.   Neurological: Positive for weakness and headaches.   All other systems reviewed and are negative.        Physical Exam   BP: 134/87  Heart Rate: 103  Temp: 100.6  F (38.1  C)  Resp: 16  Height: 170.2 cm (5' 7\")  Weight: 61.2 kg (135 lb)  SpO2: 100 %      Physical Exam   Constitutional: She is oriented to person, place, and time. She appears distressed.   HENT:   Head: Atraumatic.   Mouth/Throat: Oropharynx is clear and moist.   Eyes: Pupils are equal, round, and reactive to light. Conjunctivae and EOM are normal.   Neck: Normal range of motion. Neck supple.   Cardiovascular: Normal rate, regular rhythm and normal heart sounds. Exam reveals no friction rub.   No murmur heard.  Pulmonary/Chest: Effort normal and breath sounds normal. No stridor. No respiratory distress. She has no wheezes. She has no rales.   Abdominal: Soft. Bowel sounds are normal. She exhibits no distension and no mass. There is no tenderness. There is no rebound and no guarding.   Musculoskeletal: She exhibits no edema.   Neurological: She is alert and oriented to person, place, and time. No cranial nerve deficit. She " exhibits normal muscle tone. Coordination normal.   Skin: No rash noted. She is not diaphoretic. No pallor.               ED Course     ED Course as of Jul 15 0121   Sun Jul 14, 2019   2211 Gram Stain Smear: Preliminary Gram stain report called to and read back by  RONAK KANG RN ED 7/14/19 AT 2146. VB       Procedures                 EKG Interpretation:      Interpreted by Vin Rmasay MD  EKG done at 2102 hrs. demonstrates a sinus rhythm at 100 bpm with a normal axis and no ST change.  No T wave changes.  Normal R progression and no Q waves.  Normal intervals.  Normal conduction.  No ectopy.  Impression sinus rhythm at 100 bpm and no old EKG to compare.    Critical Care time:  none               Results for orders placed or performed during the hospital encounter of 07/14/19 (from the past 24 hour(s))   UA with Microscopic   Result Value Ref Range    Color Urine Yellow     Appearance Urine Slightly Cloudy     Glucose Urine Negative NEG^Negative mg/dL    Bilirubin Urine Negative NEG^Negative    Ketones Urine Negative NEG^Negative mg/dL    Specific Gravity Urine 1.016 1.003 - 1.035    Blood Urine Moderate (A) NEG^Negative    pH Urine 5.0 5.0 - 7.0 pH    Protein Albumin Urine 30 (A) NEG^Negative mg/dL    Urobilinogen mg/dL 0.0 0.0 - 2.0 mg/dL    Nitrite Urine Negative NEG^Negative    Leukocyte Esterase Urine Small (A) NEG^Negative    Source Midstream Urine     WBC Urine 15 (H) 0 - 5 /HPF    RBC Urine 2 0 - 2 /HPF    Bacteria Urine Few (A) NEG^Negative /HPF    Squamous Epithelial /HPF Urine 6 (H) 0 - 1 /HPF    Mucous Urine Present (A) NEG^Negative /LPF    Hyaline Casts 1 0 - 2 /LPF   Urine Culture Aerobic Bacterial   Result Value Ref Range    Specimen Description Midstream Urine     Special Requests Specimen received in preservative     Culture Micro PENDING    Glucose CSF:   Result Value Ref Range    Glucose CSF 67 40 - 70 mg/dL   Protein total CSF:   Result Value Ref Range    Protein Total CSF 31 15 - 60 mg/dL    Gram stain   Result Value Ref Range    Specimen Description Cerebrospinal fluid     Special Requests TUBE 3     Gram Stain No organisms seen     Gram Stain Few  WBC'S seen  predominantly mononuclear cells       Gram Stain       Preliminary Gram stain report called to and read back by  RONAK KANG RN ED 7/14/19 AT 2146. VB      Gram Stain       Gram stain slide reviewed at the [facility]  Gram stain review consistent with reported results.      Gram Stain       Quantification of host cells and microbiological organisms was done on a cytocentrifuged   preparation.     CSF Culture Aerobic Bacterial   Result Value Ref Range    Specimen Description Cerebrospinal fluid     Special Requests TUBE 3     Culture Micro PENDING    Cell count with differential CSF:   Result Value Ref Range    WBC CSF 1 0 - 5 /uL    RBC CSF 0 0 - 2 /uL    Tube Number 4 #    Volume 4 mL    Color CSF Colorless CLRL^Colorless    Appearance CSF Clear CLER^Clear   Lactic acid whole blood   Result Value Ref Range    Lactic Acid 1.5 0.7 - 2.0 mmol/L   Beta strep group A culture   Result Value Ref Range    Specimen Description Throat     Special Requests Specimen collected in eSwab transport (white cap)     Culture Micro PENDING    CBC with platelets differential   Result Value Ref Range    WBC 6.5 4.0 - 11.0 10e9/L    RBC Count 4.21 3.8 - 5.2 10e12/L    Hemoglobin 12.8 11.7 - 15.7 g/dL    Hematocrit 38.2 35.0 - 47.0 %    MCV 91 78 - 100 fl    MCH 30.4 26.5 - 33.0 pg    MCHC 33.5 31.5 - 36.5 g/dL    RDW 11.6 10.0 - 15.0 %    Platelet Count 212 150 - 450 10e9/L    Diff Method Automated Method     % Neutrophils 80.6 %    % Lymphocytes 13.5 %    % Monocytes 5.3 %    % Eosinophils 0.0 %    % Basophils 0.3 %    % Immature Granulocytes 0.3 %    Nucleated RBCs 0 0 /100    Absolute Neutrophil 5.2 1.6 - 8.3 10e9/L    Absolute Lymphocytes 0.9 0.8 - 5.3 10e9/L    Absolute Monocytes 0.3 0.0 - 1.3 10e9/L    Absolute  Eosinophils 0.0 0.0 - 0.7 10e9/L    Absolute Basophils 0.0 0.0 - 0.2 10e9/L    Abs Immature Granulocytes 0.0 0 - 0.4 10e9/L    Absolute Nucleated RBC 0.0    Comprehensive metabolic panel   Result Value Ref Range    Sodium 135 133 - 144 mmol/L    Potassium 3.4 3.4 - 5.3 mmol/L    Chloride 102 94 - 109 mmol/L    Carbon Dioxide 26 20 - 32 mmol/L    Anion Gap 7 3 - 14 mmol/L    Glucose 104 (H) 70 - 99 mg/dL    Urea Nitrogen 8 7 - 30 mg/dL    Creatinine 0.82 0.52 - 1.04 mg/dL    GFR Estimate 86 >60 mL/min/[1.73_m2]    GFR Estimate If Black >90 >60 mL/min/[1.73_m2]    Calcium 8.8 8.5 - 10.1 mg/dL    Bilirubin Total 0.2 0.2 - 1.3 mg/dL    Albumin 3.3 (L) 3.4 - 5.0 g/dL    Protein Total 7.9 6.8 - 8.8 g/dL    Alkaline Phosphatase 87 40 - 150 U/L    ALT 42 0 - 50 U/L    AST 35 0 - 45 U/L   Rapid Strep Screen   Result Value Ref Range    Specimen Description Throat     Rapid Strep A Screen       NEGATIVE: No Group A streptococcal antigen detected by immunoassay, await culture report.   Mono   Result Value Ref Range    Mononucleosis Screen Negative NEG^Negative   HCG qualitative   Result Value Ref Range    HCG Qualitative Serum Negative NEG^Negative   CT Sinus w/o Contrast    [facility]  CT SINUS W/O CONTRAST  7/14/2019 11:05 PM    INDICATION: Sinusitis.  TECHNIQUE: Contiguous serial axial images were obtained through the paranasal sinuses without contrast. Multiplanar reformats were generated. Dose reduction techniques were used.  CONTRAST: None.  SEDATION: None.  COMPARISON: None.    FINDINGS:  POST SURGICAL CHANGE: None.    FRONTAL SINUSES: Clear. Frontal nasal ducts are patent bilaterally.    ETHMOID SINUSES: Mild left posterior ethmoid sinus mucosal thickening. Ethmoid infundibula are patent bilaterally. Lamina papyracea are intact.    MAXILLARY SINUSES: Trace mucosal thickening is seen along the roof of the maxillary sinuses. Floors of both maxillary sinuses are intact.    SPHENOID SINUS: Clear.  Anterior clinoid processes are not pneumatized. Both carotid canals are covered with bone.    NASAL CAVITY: Leftward nasal septal bowing and small spur. Cribriform plates are equal in height and symmetric.    REMAINDER: Orbits and visualized portion of the brain are unremarkable for technique. Middle ear cavities and mastoid air cells are clear.      Impression    IMPRESSION:   1. Mild left posterior ethmoid sinus mucosal thickening. Trace sinus mucosal thickening. Paranasal sinuses otherwise clear. No air-fluid levels. Major sinus drainage pathways are intact.   XR Chest 2 Views    Narrative    XR CHEST 2 VW   7/14/2019 11:17 PM     HISTORY: Chest pain and fever.    COMPARISON: 10/16/2015.    FINDINGS: The heart size is normal. The lungs are clear. No  pneumothorax or pleural effusion.      Impression    IMPRESSION: No acute abnormality.    ANH MARTINEZ MD       Medications   0.9% sodium chloride BOLUS (0 mLs Intravenous Stopped 7/14/19 2214)     Followed by   sodium chloride 0.9% infusion (has no administration in time range)   sodium chloride 0.9% infusion (has no administration in time range)   oxymetazoline (AFRIN) 0.05 % spray 2 spray (2 sprays Both Nostrils Given 7/14/19 2249)   sodium chloride (OCEAN) 0.65 % nasal spray 1 spray (has no administration in time range)   doxycycline hyclate (VIBRAMYCIN) capsule 100 mg (has no administration in time range)   ketorolac (TORADOL) injection 15 mg (15 mg Intravenous Given 7/14/19 2108)   metoclopramide (REGLAN) injection 5 mg (5 mg Intravenous Given 7/14/19 2111)   diphenhydrAMINE (BENADRYL) injection 25 mg (25 mg Intravenous Given 7/14/19 2109)   lidocaine (PF) (XYLOCAINE) 1 % injection (  Override pull for Anesthesia 7/14/19 2015)   0.9% sodium chloride BOLUS (0 mLs Intravenous Stopped 7/15/19 0001)   ketorolac (TORADOL) injection 15 mg (15 mg Intravenous Given 7/14/19 2246)   metoclopramide (REGLAN) injection 5 mg (5 mg Intravenous Given 7/14/19 2248)    doxycycline hyclate (VIBRAMYCIN) capsule 100 mg (100 mg Oral Given 7/15/19 0043)   acetaminophen (TYLENOL) tablet 1,000 mg (1,000 mg Oral Given 7/15/19 0049)       Assessments & Plan (with Medical Decision Making)     MDM: Olesya Cintron is a 44 year old female who presented with headache neck stiffness and fever that have been ongoing for 5 days without obvious other localizing symptoms although had had sinusitis symptoms that have been prolonged.  She noted a tick bite 3 to 4 weeks ago but had not seen any rash in that location.  Her general exam was reassuring without obvious focal sources on exam but concern for meningitis given fever neck stiffness and headache, the patient underwent lumbar puncture which demonstrated no significant cells in CSF.  She also underwent 2 blood cultures for endocarditis, urinalysis, strep testing, mono, chest x-ray -all reassuring.  Repeat examinations including over abdomen revealed no obvious source at that time.  She was empirically given doxycycline after tick borne illness laboratory tests were sent including Anaplasma, Babesia and Lyme's.    She appeared nontoxic but was ill in appearance.  She received some relief of her headache with medications as above.      As we are readying to discharge the patient I was called to see her as she had found a small rash on the left shoulder.  This demonstrated approximately 7 mm lesion with erythema migrans-like appearance as shown above.  This would explain her findings.    I still set her up with a follow-up tomorrow.  Her T-max tonight was up to 103.    I have reviewed the nursing notes.    I have reviewed the findings, diagnosis, plan and need for follow up with the patient.          Medication List      There are no discharge medications for this visit.         Final diagnoses:   Nonintractable headache, unspecified chronicity pattern, unspecified headache type - Copntinue home headache management.  Stay hydrated.   Fever,  unspecified fever cause - Unclear cause.  await lab testing (tick borne illness).  await blood culture.  Start doxycyline 100 mg twice daily for 2 weeks.   recheck tomorrow in clinic   Lyme disease       7/14/2019   [facility] EMERGENCY DEPARTMENT     Vin Ramsay MD  07/15/19 2284

## 2019-07-15 NOTE — DISCHARGE INSTRUCTIONS
ICD-10-CM    1. Nonintractable headache, unspecified chronicity pattern, unspecified headache type R51     Copntinue home headache management.  Stay hydrated.   2. Fever, unspecified fever cause R50.9     Unclear cause.  await lab testing (tick borne illness).  await blood culture.  Start doxycyline 100 mg twice daily for 2 weeks.   recheck tomorrow in clinic

## 2019-07-15 NOTE — RESULT ENCOUNTER NOTE
Final result for Lyme Disease Gia with reflex to WB Serum is NEGATIVE.    No change in treatment per [place] ED Lab Result protocol.

## 2019-07-15 NOTE — ED NOTES
Anaesthesiologist at bedside to perform LP. Stated ok for pt to use restroom then return to room. Tests on hold until procedure performed. Yvrose ABRAMS at bedside to assist.

## 2019-07-15 NOTE — RESULT ENCOUNTER NOTE
Preliminary Parasite stain is NEGATIVE for Anaplasma phagocytophilum or Ehrlichia spp., and Babesia  Await final report.

## 2019-07-15 NOTE — ED NOTES
Pt's spouse states he just noticed a blister and rash on the pt's right scapula. + small blistering noted. Localized swelling and a bullseye rash noted. MD made aware,

## 2019-07-15 NOTE — ED NOTES
Anaesthesiologist at pt's bedside per MD's request. Requesting to perform the LP before any other procedures. MD is aware. Pt and family updated about treatment plan.

## 2019-07-15 NOTE — ANESTHESIA PROCEDURE NOTES
Peripheral nerve/Neuraxial procedure note : lumbar puncture  Pre-Procedure    Location: ED      Pre-Anesthestic Checklist: patient identified, risks and benefits discussed, informed consent, monitors and equipment checked, pre-op evaluation and at physician/surgeon's request    Timeout  Correct Patient: Yes   Correct Procedure: Yes   Correct Site: Yes   Correct Laterality: N/A   Correct Position: Yes   Site Marked: N/A   .   Procedure Documentation  ASA 2  .    Procedure:    Lumbar puncture.  Insertion Site:L3-4  (midline approach)      Patient Prep;povidone-iodine 7.5% surgical scrub.  .  Needle: Rommel tip Spinal Needle (gauge): 22  No introducer used .       Assessment/Narrative  Paresthesias: No.  .  .  16 mL of clear CSF fluid removed Opening pressure was 21 cmH2O. while lateral   . Comments:  4 tubes sequentially filled to 4cc each.  Back cleansed of Betadine.  Bandaid applied.

## 2019-07-15 NOTE — RESULT ENCOUNTER NOTE
Emergency Dept/Urgent Care discharge antibiotic (if prescribed): Doxycycline  Date of Rx (if applicable):  7/14/19  No changes in treatment per Urine culture protocol.

## 2019-07-15 NOTE — PROGRESS NOTES
Subjective     Olesya Cintron is a 44 year old female who presents to clinic today for the following health issues:    HPI   ED/UC Followup:    Facility:  [place]   Date of visit: 7/14/19  Reason for visit:  Lymes disease  Current Status: No temp since 4 AM today.  HA is improved.  Still having some jaw pain and lip numbness.  No palpitations or chest pain.   Had some right hip pain; improved now     Olesya was in the ED on 7/1 with fever to 101 and headache.  She had left ear pressure, mild congestion, brief right chest pain, burning in the chest, and bottom lip numbness.  Had a embedded tick 2-3 weeks prior.  Bull's eye rash was noted on her back and she was started on doxycycline for likely Lyme disease.  She did have lumbar puncture, 2 blood cultures for endocarditis (prelim negative today), urinalysis, strep testing, mono, chest x-ray that was reassuring.  Final Parasite stain is NEGATIVE for Anaplasma phagocytophilum or Ehrlichia spp., and Babesia.  Lyme antibody negative.  Ehrlichia and babesia PCR are in process.  Urine and CSF cultures are negative so far.          Patient Active Problem List   Diagnosis     History of urinary tract infection     Other alopecia     CARDIOVASCULAR SCREENING; LDL GOAL LESS THAN 160     Pelvic pain in female     Vulvar vestibulitis     ADHD (attention deficit hyperactivity disorder)     Allergic rhinitis     Recurrent urinary tract infection     Lyme disease     Past Surgical History:   Procedure Laterality Date     HC COLONOSCOPY THRU STOMA, DIAGNOSTIC  8/07    normal     HC REMOVE TONSILS/ADENOIDS,12+ Y/O       LEEP TX, CERVICAL  1995     SURGICAL HISTORY OF -   age 6    Hydrodistention of bladder     SURGICAL HISTORY OF -       Local excision of skin precancer- foot and ear     UPPER GI ENDOSCOPY  8/07    H-pylori       Social History     Tobacco Use     Smoking status: Former Smoker     Smokeless tobacco: Never Used   Substance Use Topics     Alcohol use: Yes     Comment:  occas-      Family History   Problem Relation Age of Onset     Thyroid Disease Mother      Hypertension Mother      Lipids Mother      Lipids Father      Hypertension Father      Heart Disease Father         bypass surgery     Gastrointestinal Disease Father         ulcer     Prostate Cancer Paternal Grandfather      Alzheimer Disease Paternal Grandmother      Cardiovascular Maternal Grandfather         MI     Diabetes Maternal Grandfather      Breast Cancer No family hx of      Colon Cancer No family hx of          Current Outpatient Medications   Medication Sig Dispense Refill     amphetamine-dextroamphetamine (ADDERALL XR) 20 MG 24 hr capsule Take 20 mg by mouth daily       cyclobenzaprine (FLEXERIL) 10 MG tablet Take 0.5-1 tablets (5-10 mg) by mouth 3 times daily as needed for muscle spasms 30 tablet 1     doxycycline hyclate (VIBRAMYCIN) 100 MG capsule Take 1 capsule (100 mg) by mouth 2 times daily for 14 days 28 capsule 0     fluticasone (FLONASE) 50 MCG/ACT spray USE ONE TO TWO SPRAYS IN BOTH NOSTRILS ONCE DAILY AS NEEDED FOR RHINITIS OR ALLERGIES 16 g 1     ibuprofen (ADVIL,MOTRIN) 200 MG tablet Take 200 mg by mouth every 4 hours as needed for mild pain       multivitamin, therapeutic with minerals (MULTI-VITAMIN) TABS tablet Take 1 tablet by mouth daily       norgestimate-ethinyl estradiol (MONO-LINYAH) 0.25-35 MG-MCG tablet Take 1 tablet by mouth daily 84 tablet 3     omeprazole (PRILOSEC) 20 MG CR capsule Take 1 capsule (20 mg) by mouth daily 90 capsule 3     traZODone (DESYREL) 25 MG TABS Take 50 mg by mouth At Bedtime 1 to 1 1/2 tablets at bedtime       VITAMIN D, CHOLECALCIFEROL, PO Take by mouth daily       albuterol (2.5 MG/3ML) 0.083% nebulizer solution Take 1 vial (2.5 mg) by nebulization every 6 hours as needed for shortness of breath / dyspnea or wheezing (Patient not taking: Reported on 3/13/2019) 30 vial 1     albuterol (PROAIR HFA/PROVENTIL HFA/VENTOLIN HFA) 108 (90 Base) MCG/ACT Inhaler  "Inhale 2 puffs into the lungs every 6 hours as needed for shortness of breath / dyspnea or wheezing (Patient not taking: Reported on 6/25/2019) 1 Inhaler 3     Fish Oil-Cholecalciferol (FISH OIL + D3 PO)        methylphenidate (RITALIN) 20 MG tablet Take 20 mg by mouth 2 times daily       order for DME Equipment being ordered: Valved chamber spacer, use with inhaler. (Patient not taking: Reported on 6/25/2019) 1 Device 0     Allergies   Allergen Reactions     Ciprofloxacin Nausea and Vomiting     Penicillin [Penicillins]      Hives         Reviewed and updated as needed this visit by Provider         Review of Systems   ROS COMP: Constitutional, HEENT, cardiovascular, derm systems are negative, except as otherwise noted.      Objective    /60   Temp 98.4  F (36.9  C)   Resp 18   Ht 1.702 m (5' 7\")   Wt 67.1 kg (148 lb)   LMP 07/15/2019   SpO2 98%   BMI 23.18 kg/m    Body mass index is 23.18 kg/m .  Physical Exam   GENERAL: healthy, alert and no distress  RESP: lungs clear to auscultation - no rales, rhonchi or wheezes  CV: regular rate and rhythm, normal S1 S2, no S3 or S4, no murmur, click or rub, no peripheral edema and peripheral pulses strong  SKIN: Bull's-eye rash present on the right upper back  NEURO: No facial droop noted          Assessment & Plan     1. Lyme disease    Symptoms and presence of bull's-eye rash are certainly suggestive of Lyme disease.  Her negative Lyme test is likely the result of it being too early in the disease course and is likely a false negative.  She has had great improvement with the doxycycline already, also supportive of diagnosis of Lyme disease.  We will plan to continue the course of this through completion.  Follow-up as needed if symptoms do not resolve.    Return in about 2 weeks (around 7/29/2019), or if symptoms worsen or fail to improve.    Wilmer Seay MD  [facility] -     "

## 2019-07-15 NOTE — PATIENT INSTRUCTIONS
Patient Education     Lyme Disease  Lyme disease is caused by bacteria. The infection is most often passed during the bite of a deer tick. The tick is very small, so many people with Lyme disease don't know they have been bitten. Tests for Lyme disease are not always accurate early in the disease. If the disease is suspected, treatment may start before testing confirms the infection. A long course of antibiotics is the standard treatment.  If untreated, Lyme disease can cause symptoms in many parts of the body that may worsen.    Early symptoms limited to a small area may appear within a few days to a month after the tick bite. These symptoms may include a round, red rash that sometimes looks like a bull's-eye target with darker outer ring and a darker center. There may fever, chills, fatigue, body aches, and headache. In time, the rash goes away, even without treatment. That doesn't mean the infection has gone away, however. In some cases, early local symptoms never develop.    Early body-wide symptoms may appear weeks to months after the bite. These can include rashes on the skin of various parts of the body, muscle aches, fatigue, fever, headache, stiff neck, weakness on one side of the face, dizziness, palpitations, passing out, and joint pain and swelling.    Late-stage symptoms can include weakness in an arm, or leg, headache, fever, and numbness and tingling in the arms or legs, joint pain and swelling, confusion, and memory loss.    Many people will have left over symptoms even after treatment and cure of the Lyme disease. These are called post-Lyme symptoms and may include fatigue, body aches, joint aches, and headaches, which generally improve with time. Repeated courses of antibiotics don't help these symptoms to resolve faster. And, having the symptoms after a course of treatment does not mean that the Lyme bacteria is still active in the body.  Testing is done to check for the bacteria. When the  infection is treated early, it can be cured. In some cases, a second or third course of antibiotics may be needed. Be sure to follow your healthcare providers directions about treatment.  Home care  If antibiotic pills have been prescribed, take them exactly as directed until they are completely gone. Don't stop taking them until you have taken the full course or your healthcare provider has told you to stop.  Ask your healthcare provider about taking over-the-counter medicines to control symptoms such as aches and fever.  Follow-up care  Follow up with your healthcare provider, or as advised. Be sure to return for follow-up testing as directed to be sure the infection has been treated.  When to seek medical advice  Call your healthcare provider right away if any of the following occur:    Current symptoms get worse    Unexplained fever, neck pain or stiffness, or headache    Arm, leg or facial weakness    Joint pain or swelling    Numbness and tingling in the arms or legs    Confusion or memory loss    Irregular or rapid heartbeat, palpitations, dizziness, or passing out  Date Last Reviewed: 3/1/2018    7691-3594 [employer]. [address] [place] [address]. All rights reserved. This information is not intended as a substitute for professional medical care. Always follow your healthcare professional's instructions.

## 2019-07-16 LAB
BACTERIA SPEC CULT: NORMAL
BACTERIA SPEC CULT: NORMAL
Lab: NORMAL
Lab: NORMAL
SPECIMEN SOURCE: NORMAL
SPECIMEN SOURCE: NORMAL

## 2019-07-16 NOTE — RESULT ENCOUNTER NOTE
Final urine culture report is NEGATIVE per [facility] Lab Result protocol.    If NEGATIVE result, no change in treatment, per [facility] ED Lab Result protocol.

## 2019-07-16 NOTE — RESULT ENCOUNTER NOTE
Final Beta strep group A r/o culture is NEGATIVE for Group A streptococcus.    No treatment or change in treatment per [facility] Strep protocol.

## 2019-07-18 LAB
A PHAGOCYTOPH DNA BLD QL NAA+PROBE: NOT DETECTED
B MICROTI DNA SPEC QL NAA+PROBE: NOT DETECTED
BABESIA DNA SPEC QL NAA+PROBE: NOT DETECTED
E CHAFFEENSIS DNA BLD QL NAA+PROBE: NOT DETECTED
E EWINGII DNA SPEC QL NAA+PROBE: NOT DETECTED
EHRLICHIA DNA SPEC QL NAA+PROBE: NOT DETECTED

## 2019-07-18 NOTE — RESULT ENCOUNTER NOTE
Final result for Babesia Species by PCR is [NEGATIVE] and Babesia Microtic by PCR is [NEGATIVE].  No change in treatment per [facility] Lab Result protocol.

## 2019-07-18 NOTE — RESULT ENCOUNTER NOTE
Final result for Ehrlichia Anaplasma sp by PCR is NEGATIVE for A. phagocytophilium, E. Chaffeenis, E. ewingii/canis and E. muris-like.  No change in treatment per  ED lab result protocol.

## 2019-07-19 LAB
BACTERIA SPEC CULT: NO GROWTH
Lab: NORMAL
SPECIMEN SOURCE: NORMAL

## 2019-07-19 NOTE — RESULT ENCOUNTER NOTE
Final CSF culture is NEGATIVE.    No treatment or change in treatment per [facility] ED Lab Result protocol.

## 2019-07-21 LAB
B BURGDOR IGG CSF QL IB: NEGATIVE
B BURGDOR IGM CSF QL IB: NEGATIVE
BACTERIA SPEC CULT: NO GROWTH
BACTERIA SPEC CULT: NO GROWTH
Lab: NORMAL
Lab: NORMAL
SPECIMEN SOURCE: NORMAL
SPECIMEN SOURCE: NORMAL

## 2019-08-02 ENCOUNTER — TELEPHONE (OUTPATIENT)
Dept: FAMILY MEDICINE | Facility: CLINIC | Age: 44
End: 2019-08-02

## 2019-08-02 DIAGNOSIS — A69.20 LYME DISEASE: Primary | ICD-10-CM

## 2019-08-02 RX ORDER — DOXYCYCLINE 100 MG/1
100 CAPSULE ORAL 2 TIMES DAILY
Qty: 14 CAPSULE | Refills: 0 | Status: SHIPPED | OUTPATIENT
Start: 2019-08-02 | End: 2019-12-14

## 2019-08-02 NOTE — TELEPHONE ENCOUNTER
We can extend the antibiotics by another week- prescription sent.  Follow-up in clinic if still not improving after that.    Thanks,  Wilmer Seay MD

## 2019-08-02 NOTE — TELEPHONE ENCOUNTER
Patient seen ER 7-14-19, treated for Lyme with doxy.  Had ER f/u 7-15-19.  She is still experiencing Left jaw numbness, neck stiffness, mild HA, tick bite still present.  No fever.  Only new symptom is left knee ankle stiffness/pain.    Patient states you had mentioned another round of doxy if symptoms persisted?  OV notes do not reflect this.    Med and pharm ready..  Routing to provider.  Mey LARKIN RN

## 2019-08-02 NOTE — TELEPHONE ENCOUNTER
Reason for call:  Patient reporting a symptom    Symptom or request: Pt calling reporting symptoms that have continued from lymes visit, lip numbness, poralisis of face, jaw stiffness, fatigue, ankle pain headaches, no fevers, finished doxycycline Monday wondering if she needs another rx, please advise.    Duration (how long have symptoms been present): ongoing    Have you been treated for this before? Yes    Additional comments:     Phone Number patient can be reached at:  Home number on file [phone] (home)    Best Time:  any    Can we leave a detailed message on this number:  YES    Call taken on 8/2/2019 at 2:14 PM by Nunu Roche

## 2019-10-04 ENCOUNTER — HOSPITAL ENCOUNTER (EMERGENCY)
Facility: CLINIC | Age: 44
Discharge: HOME | End: 2019-10-04
Attending: NURSE PRACTITIONER | Admitting: NURSE PRACTITIONER
Payer: COMMERCIAL

## 2019-10-04 VITALS
TEMPERATURE: 98 F | OXYGEN SATURATION: 100 % | SYSTOLIC BLOOD PRESSURE: 162 MMHG | RESPIRATION RATE: 16 BRPM | BODY MASS INDEX: 21.93 KG/M2 | HEART RATE: 86 BPM | DIASTOLIC BLOOD PRESSURE: 104 MMHG | WEIGHT: 140 LBS

## 2019-10-04 DIAGNOSIS — R31.29 MICROSCOPIC HEMATURIA: ICD-10-CM

## 2019-10-04 DIAGNOSIS — R35.0 URINARY FREQUENCY: ICD-10-CM

## 2019-10-04 LAB
ALBUMIN UR-MCNC: NEGATIVE MG/DL
APPEARANCE UR: CLEAR
BACTERIA #/AREA URNS HPF: ABNORMAL /HPF
BILIRUB UR QL STRIP: NEGATIVE
COLOR UR AUTO: YELLOW
GLUCOSE UR STRIP-MCNC: NEGATIVE MG/DL
HCG UR QL: NEGATIVE
HGB UR QL STRIP: ABNORMAL
KETONES UR STRIP-MCNC: NEGATIVE MG/DL
LEUKOCYTE ESTERASE UR QL STRIP: NEGATIVE
MUCOUS THREADS #/AREA URNS LPF: PRESENT /LPF
NITRATE UR QL: NEGATIVE
PH UR STRIP: 6 PH (ref 5–7)
RBC #/AREA URNS AUTO: 14 /HPF (ref 0–2)
SOURCE: ABNORMAL
SP GR UR STRIP: 1.01 (ref 1–1.03)
SQUAMOUS #/AREA URNS AUTO: 1 /HPF (ref 0–1)
UROBILINOGEN UR STRIP-MCNC: 0 MG/DL (ref 0–2)
WBC #/AREA URNS AUTO: 1 /HPF (ref 0–5)

## 2019-10-04 PROCEDURE — 81025 URINE PREGNANCY TEST: CPT | Performed by: NURSE PRACTITIONER

## 2019-10-04 PROCEDURE — G0463 HOSPITAL OUTPT CLINIC VISIT: HCPCS | Performed by: NURSE PRACTITIONER

## 2019-10-04 PROCEDURE — 99213 OFFICE O/P EST LOW 20 MIN: CPT | Mod: Z6 | Performed by: NURSE PRACTITIONER

## 2019-10-04 PROCEDURE — 81001 URINALYSIS AUTO W/SCOPE: CPT | Performed by: NURSE PRACTITIONER

## 2019-10-04 PROCEDURE — 87086 URINE CULTURE/COLONY COUNT: CPT | Performed by: NURSE PRACTITIONER

## 2019-10-04 ASSESSMENT — ENCOUNTER SYMPTOMS
FEVER: 0
FLANK PAIN: 0
DYSURIA: 0
FREQUENCY: 1
COUGH: 0
FATIGUE: 1
ABDOMINAL PAIN: 0
VOMITING: 0
BACK PAIN: 1
HEADACHES: 0
CHILLS: 0

## 2019-10-04 NOTE — ED PROVIDER NOTES
"  History     Chief Complaint   Patient presents with     UTI     uti sx for 2 weeks, hx of uti     HPI  Olesya Cintron is a 44 year old female who presents to urgent care for evaluation of urinary frequency and back pain.  Symptoms have been on and off for the last 2 weeks. No fevers or chills.  No abdominal pain. Treating symptoms with cranberry juice, anti-inflammatories, and heating pad.  Patient states she has a history of frequent UTIs.  Last UTI was a few months ago.     Allergies:  Allergies   Allergen Reactions     Ciprofloxacin Nausea and Vomiting     Penicillin [Penicillins]      Hives         Problem List:    Patient Active Problem List    Diagnosis Date Noted     Lyme disease 07/15/2019     Priority: Medium     Recurrent urinary tract infection 2016     Priority: Medium     Allergic rhinitis 10/29/2014     Priority: Medium     Problem list name updated by automated process. Provider to review       ADHD (attention deficit hyperactivity disorder) 2013     Priority: Medium     Vulvar vestibulitis 2013     Priority: Medium     Pelvic pain in female 2012     Priority: Medium     CARDIOVASCULAR SCREENING; LDL GOAL LESS THAN 160 10/31/2010     Priority: Medium     History of urinary tract infection 2008     Priority: Medium     FREQUENT Q2 month infections, \"documented\" by cx at another facility; txed with hydrodistention at age 6 for frequent UTI  Problem list name updated by automated process. Provider to review       Other alopecia 2008     Priority: Medium        Past Medical History:    Past Medical History:   Diagnosis Date     Depression      Eating disorder      Helicobacter pylori (H. pylori)      History of frequent urinary tract infections      MEDICAL HISTORY OF -      Mononucleosis      Personal history of other malignant neoplasm of skin       labor ,     S/P LEEP of cervix        Past Surgical History:    Past Surgical History: "   Procedure Laterality Date     HC COLONOSCOPY THRU STOMA, DIAGNOSTIC  8/07    normal     HC REMOVE TONSILS/ADENOIDS,12+ Y/O       LEEP TX, CERVICAL  1995     SURGICAL HISTORY OF -   age 6    Hydrodistention of bladder     SURGICAL HISTORY OF -       Local excision of skin precancer- foot and ear     UPPER GI ENDOSCOPY  8/07    H-pylori       Family History:    Family History   Problem Relation Age of Onset     Thyroid Disease Mother      Hypertension Mother      Lipids Mother      Lipids Father      Hypertension Father      Heart Disease Father         bypass surgery     Gastrointestinal Disease Father         ulcer     Prostate Cancer Paternal Grandfather      Alzheimer Disease Paternal Grandmother      Cardiovascular Maternal Grandfather         MI     Diabetes Maternal Grandfather      Breast Cancer No family hx of      Colon Cancer No family hx of        Social History:  Marital Status:   [2]  Social History     Tobacco Use     Smoking status: Former Smoker     Smokeless tobacco: Never Used   Substance Use Topics     Alcohol use: Yes     Comment: occas-      Drug use: No        Medications:    albuterol (2.5 MG/3ML) 0.083% nebulizer solution  albuterol (PROAIR HFA/PROVENTIL HFA/VENTOLIN HFA) 108 (90 Base) MCG/ACT Inhaler  amphetamine-dextroamphetamine (ADDERALL XR) 20 MG 24 hr capsule  cyclobenzaprine (FLEXERIL) 10 MG tablet  Fish Oil-Cholecalciferol (FISH OIL + D3 PO)  fluticasone (FLONASE) 50 MCG/ACT spray  ibuprofen (ADVIL,MOTRIN) 200 MG tablet  methylphenidate (RITALIN) 20 MG tablet  multivitamin, therapeutic with minerals (MULTI-VITAMIN) TABS tablet  norgestimate-ethinyl estradiol (MONO-LINYAH) 0.25-35 MG-MCG tablet  omeprazole (PRILOSEC) 20 MG CR capsule  order for DME  traZODone (DESYREL) 25 MG TABS  VITAMIN D, CHOLECALCIFEROL, PO          Review of Systems   Constitutional: Positive for fatigue. Negative for chills and fever.   HENT: Negative for congestion.    Respiratory: Negative for cough.     Gastrointestinal: Negative for abdominal pain and vomiting.   Genitourinary: Positive for frequency. Negative for dysuria, flank pain, pelvic pain, urgency, vaginal discharge and vaginal pain.   Musculoskeletal: Positive for back pain.   Skin: Negative for rash.   Neurological: Negative for headaches.       Physical Exam   BP: (!) 162/104  Pulse: 86  Temp: 98  F (36.7  C)  Resp: 16  Weight: 63.5 kg (140 lb)  SpO2: 100 %      Physical Exam    GENERAL APPEARANCE: healthy, alert and no acute distress  RESP: lungs clear to auscultation - no rales, rhonchi or wheezes  CV: regular rates and rhythm, normal S1 S2, no murmur noted      ED Course        Procedures               Results for orders placed or performed during the hospital encounter of 10/04/19 (from the past 24 hour(s))   UA with Microscopic   Result Value Ref Range    Color Urine Yellow     Appearance Urine Clear     Glucose Urine Negative NEG^Negative mg/dL    Bilirubin Urine Negative NEG^Negative    Ketones Urine Negative NEG^Negative mg/dL    Specific Gravity Urine 1.014 1.003 - 1.035    Blood Urine Small (A) NEG^Negative    pH Urine 6.0 5.0 - 7.0 pH    Protein Albumin Urine Negative NEG^Negative mg/dL    Urobilinogen mg/dL 0.0 0.0 - 2.0 mg/dL    Nitrite Urine Negative NEG^Negative    Leukocyte Esterase Urine Negative NEG^Negative    Source Midstream Urine     WBC Urine 1 0 - 5 /HPF    RBC Urine 14 (H) 0 - 2 /HPF    Bacteria Urine Few (A) NEG^Negative /HPF    Squamous Epithelial /HPF Urine 1 0 - 1 /HPF    Mucous Urine Present (A) NEG^Negative /LPF       Medications - No data to display    Assessments & Plan (with Medical Decision Making)   Urinalysis negative for evidence of infection. Positive for microscopic hematuria. I did consider kidney stone;however, patient does not appear to be in any distress that I would expect with a kidney stone. I discussed the UA results with patient. Instructed to continue to monitor her symptoms.  Return for any worsening.   Urine culture is pending.      I have reviewed the nursing notes.    I have reviewed the findings, diagnosis, plan and need for follow up with the patient.      Discharge Medication List as of 10/4/2019  6:29 PM          Final diagnoses:   Urinary frequency   Microscopic hematuria       10/4/2019   [facility] EMERGENCY DEPARTMENT     Nina Madden APRN CNP  10/04/19 1928

## 2019-10-04 NOTE — ED AVS SNAPSHOT
[facility] Emergency Department  [address] [address]  Phone:  [phone]  Fax:  [phone]                                    Olesya Cintron   MRN: [id number]    Department:  [facility] Emergency Department   Date of Visit:  10/4/2019           After Visit Summary Signature Page    I have received my discharge instructions, and my questions have been answered. I have discussed any challenges I see with this plan with the nurse or doctor.    ..........................................................................................................................................  Patient/Patient Representative Signature      ..........................................................................................................................................  Patient Representative Print Name and Relationship to Patient    ..................................................               ................................................  Date                                   Time    ..........................................................................................................................................  Reviewed by Signature/Title    ...................................................              ..............................................  Date                                               Time          22EPIC Rev 08/18

## 2019-10-05 ENCOUNTER — HEALTH MAINTENANCE LETTER (OUTPATIENT)
Age: 44
End: 2019-10-05

## 2019-10-06 LAB
BACTERIA SPEC CULT: NO GROWTH
Lab: NORMAL
SPECIMEN SOURCE: NORMAL

## 2019-11-08 ENCOUNTER — OFFICE VISIT (OUTPATIENT)
Dept: FAMILY MEDICINE | Facility: CLINIC | Age: 44
End: 2019-11-08
Payer: COMMERCIAL

## 2019-11-08 VITALS
HEART RATE: 80 BPM | SYSTOLIC BLOOD PRESSURE: 120 MMHG | TEMPERATURE: 98.3 F | BODY MASS INDEX: 23.86 KG/M2 | WEIGHT: 152 LBS | DIASTOLIC BLOOD PRESSURE: 78 MMHG | OXYGEN SATURATION: 98 % | HEIGHT: 67 IN

## 2019-11-08 DIAGNOSIS — R03.0 ELEVATED BP WITHOUT DIAGNOSIS OF HYPERTENSION: ICD-10-CM

## 2019-11-08 DIAGNOSIS — M54.2 NECK PAIN: Primary | ICD-10-CM

## 2019-11-08 DIAGNOSIS — Z86.19 H/O LYME DISEASE: ICD-10-CM

## 2019-11-08 PROCEDURE — 99214 OFFICE O/P EST MOD 30 MIN: CPT | Performed by: NURSE PRACTITIONER

## 2019-11-08 RX ORDER — UBIDECARENONE 100 MG
CAPSULE ORAL DAILY
COMMUNITY

## 2019-11-08 RX ORDER — CYCLOBENZAPRINE HCL 10 MG
5-10 TABLET ORAL 3 TIMES DAILY PRN
Qty: 30 TABLET | Refills: 1 | Status: SHIPPED | OUTPATIENT
Start: 2019-11-08 | End: 2020-08-04

## 2019-11-08 RX ORDER — AMOXICILLIN 500 MG
1200 CAPSULE ORAL DAILY
COMMUNITY

## 2019-11-08 ASSESSMENT — MIFFLIN-ST. JEOR: SCORE: 1367.1

## 2019-11-08 NOTE — PATIENT INSTRUCTIONS
Thank you for choosing [facility].  You may be receiving an email and/or telephone survey request from [facility] [employer] regarding your visit today.  Please take a few minutes to respond to the survey to let us know how we are doing.      If you have questions or concerns, please contact us via [employer] or you can contact your care team at [phone].    Our Clinic hours are:  Monday 6:40 am  to 7:00 pm  Tuesday -Friday 6:40 am to 5:00 pm    The [place] outpatient lab hours are:  Monday - Friday 6:10 am to 4:45 pm  Saturdays 7:00 am to 11:00 am  Appointments are required, call [phone]    If you have clinical questions after hours or would like to schedule an appointment,  call the clinic at [phone].

## 2019-12-05 ENCOUNTER — HOSPITAL ENCOUNTER (EMERGENCY)
Facility: CLINIC | Age: 44
Discharge: HOME | End: 2019-12-05
Attending: PHYSICIAN ASSISTANT | Admitting: PHYSICIAN ASSISTANT
Payer: COMMERCIAL

## 2019-12-05 VITALS
TEMPERATURE: 98.1 F | OXYGEN SATURATION: 100 % | DIASTOLIC BLOOD PRESSURE: 107 MMHG | SYSTOLIC BLOOD PRESSURE: 167 MMHG | HEART RATE: 88 BPM | RESPIRATION RATE: 16 BRPM

## 2019-12-05 DIAGNOSIS — N76.0 BACTERIAL VAGINOSIS: ICD-10-CM

## 2019-12-05 DIAGNOSIS — B96.89 BACTERIAL VAGINOSIS: ICD-10-CM

## 2019-12-05 DIAGNOSIS — N39.0 URINARY TRACT INFECTION WITHOUT HEMATURIA, SITE UNSPECIFIED: ICD-10-CM

## 2019-12-05 LAB
ALBUMIN UR-MCNC: NEGATIVE MG/DL
APPEARANCE UR: CLEAR
BACTERIA #/AREA URNS HPF: ABNORMAL /HPF
BILIRUB UR QL STRIP: NEGATIVE
COLOR UR AUTO: YELLOW
GLUCOSE UR STRIP-MCNC: NEGATIVE MG/DL
HCG UR QL: NEGATIVE
HGB UR QL STRIP: ABNORMAL
KETONES UR STRIP-MCNC: NEGATIVE MG/DL
LEUKOCYTE ESTERASE UR QL STRIP: ABNORMAL
NITRATE UR QL: NEGATIVE
PH UR STRIP: 6 PH (ref 5–7)
RBC #/AREA URNS AUTO: 8 /HPF (ref 0–2)
SOURCE: ABNORMAL
SP GR UR STRIP: 1.01 (ref 1–1.03)
SPECIMEN SOURCE: ABNORMAL
SQUAMOUS #/AREA URNS AUTO: 1 /HPF (ref 0–1)
UROBILINOGEN UR STRIP-MCNC: 0 MG/DL (ref 0–2)
WBC #/AREA URNS AUTO: 54 /HPF (ref 0–5)
WET PREP SPEC: ABNORMAL

## 2019-12-05 PROCEDURE — 81025 URINE PREGNANCY TEST: CPT | Performed by: PHYSICIAN ASSISTANT

## 2019-12-05 PROCEDURE — 99213 OFFICE O/P EST LOW 20 MIN: CPT | Mod: Z6 | Performed by: PHYSICIAN ASSISTANT

## 2019-12-05 PROCEDURE — 81001 URINALYSIS AUTO W/SCOPE: CPT | Performed by: PHYSICIAN ASSISTANT

## 2019-12-05 PROCEDURE — 87210 SMEAR WET MOUNT SALINE/INK: CPT | Performed by: PHYSICIAN ASSISTANT

## 2019-12-05 PROCEDURE — G0463 HOSPITAL OUTPT CLINIC VISIT: HCPCS

## 2019-12-05 PROCEDURE — 87186 SC STD MICRODIL/AGAR DIL: CPT | Performed by: PHYSICIAN ASSISTANT

## 2019-12-05 PROCEDURE — 87088 URINE BACTERIA CULTURE: CPT | Performed by: PHYSICIAN ASSISTANT

## 2019-12-05 PROCEDURE — 87086 URINE CULTURE/COLONY COUNT: CPT | Performed by: PHYSICIAN ASSISTANT

## 2019-12-05 RX ORDER — CEFDINIR 300 MG/1
300 CAPSULE ORAL 2 TIMES DAILY
Qty: 14 CAPSULE | Refills: 0 | Status: SHIPPED | OUTPATIENT
Start: 2019-12-05 | End: 2019-12-14

## 2019-12-05 RX ORDER — METRONIDAZOLE 500 MG/1
500 TABLET ORAL 2 TIMES DAILY
Qty: 14 TABLET | Refills: 0 | Status: SHIPPED | OUTPATIENT
Start: 2019-12-05 | End: 2019-12-14

## 2019-12-05 ASSESSMENT — ENCOUNTER SYMPTOMS
BACK PAIN: 1
FREQUENCY: 1
DYSURIA: 1
GASTROINTESTINAL NEGATIVE: 1
CONSTITUTIONAL NEGATIVE: 1
FEVER: 0

## 2019-12-05 NOTE — ED PROVIDER NOTES
"  History     Chief Complaint   Patient presents with     UTI     Pt c/o urinary tract infection vs vag infection     HPI  Olesya Cintron is a 44 year old female who presents with complaints of progressive burning with urination and increased urinary urgency and frequency.  Patient states she was evaluated approximately 2 months ago and had a negative urinalysis at that she feels as if her symptoms have been progressive since that time.  Patient also complains of associated vaginal discharge.  She has tried over-the-counter yeast treatments with minimal improvement.  She is also been applying tea tree oil and drinking cranberry juice without improvement. Patient reports having history of recurrent urinary tract infections.  Denies fevers, chills, nausea, vomiting, abdominal pain, hematuria, or flank pain.  She denies any new sexual partner or concern for STD.      Allergies:  Allergies   Allergen Reactions     Ciprofloxacin Nausea and Vomiting     Penicillin [Penicillins]      Hives         Problem List:    Patient Active Problem List    Diagnosis Date Noted     Lyme disease 07/15/2019     Priority: Medium     Recurrent urinary tract infection 03/29/2016     Priority: Medium     Allergic rhinitis 10/29/2014     Priority: Medium     Problem list name updated by automated process. Provider to review       ADHD (attention deficit hyperactivity disorder) 07/02/2013     Priority: Medium     Vulvar vestibulitis 06/07/2013     Priority: Medium     Pelvic pain in female 05/22/2012     Priority: Medium     CARDIOVASCULAR SCREENING; LDL GOAL LESS THAN 160 10/31/2010     Priority: Medium     History of urinary tract infection 03/31/2008     Priority: Medium     FREQUENT Q2 month infections, \"documented\" by cx at another facility; txed with hydrodistention at age 6 for frequent UTI  Problem list name updated by automated process. Provider to review       Other alopecia 03/31/2008     Priority: Medium        Past Medical " History:    Past Medical History:   Diagnosis Date     Depression      Eating disorder      Helicobacter pylori (H. pylori)      History of frequent urinary tract infections      MEDICAL HISTORY OF -      Mononucleosis      Personal history of other malignant neoplasm of skin       labor ,     S/P LEEP of cervix        Past Surgical History:    Past Surgical History:   Procedure Laterality Date     HC COLONOSCOPY THRU STOMA, DIAGNOSTIC      normal     HC REMOVE TONSILS/ADENOIDS,12+ Y/O       LEEP TX, CERVICAL       SURGICAL HISTORY OF -   age 6    Hydrodistention of bladder     SURGICAL HISTORY OF -       Local excision of skin precancer- foot and ear     UPPER GI ENDOSCOPY      H-pylori       Family History:    Family History   Problem Relation Age of Onset     Thyroid Disease Mother      Hypertension Mother      Lipids Mother      Lipids Father      Hypertension Father      Heart Disease Father         bypass surgery     Gastrointestinal Disease Father         ulcer     Prostate Cancer Paternal Grandfather      Alzheimer Disease Paternal Grandmother      Cardiovascular Maternal Grandfather         MI     Diabetes Maternal Grandfather      Breast Cancer No family hx of      Colon Cancer No family hx of        Social History:  Marital Status:   [2]  Social History     Tobacco Use     Smoking status: Former Smoker     Packs/day: 0.00     Smokeless tobacco: Never Used     Tobacco comment: uses nicotene gum on occassion   Substance Use Topics     Alcohol use: Yes     Comment: one drink per week     Drug use: No        Medications:    cefdinir (OMNICEF) 300 MG capsule  metroNIDAZOLE (FLAGYL) 500 MG tablet  albuterol (2.5 MG/3ML) 0.083% nebulizer solution  albuterol (PROAIR HFA/PROVENTIL HFA/VENTOLIN HFA) 108 (90 Base) MCG/ACT Inhaler  amphetamine-dextroamphetamine (ADDERALL XR) 20 MG 24 hr capsule  co-enzyme Q-10 100 MG CAPS capsule  cyclobenzaprine (FLEXERIL) 10 MG tablet  Fish  Oil-Cholecalciferol (FISH OIL + D3 PO)  fluticasone (FLONASE) 50 MCG/ACT spray  ibuprofen (ADVIL,MOTRIN) 200 MG tablet  methylphenidate (RITALIN) 20 MG tablet  multivitamin, therapeutic with minerals (MULTI-VITAMIN) TABS tablet  norgestimate-ethinyl estradiol (MONO-LINYAH) 0.25-35 MG-MCG tablet  Omega-3 Fatty Acids (FISH OIL) 1200 MG capsule  omeprazole (PRILOSEC) 20 MG CR capsule  order for DME  traZODone (DESYREL) 25 MG TABS  VITAMIN D, CHOLECALCIFEROL, PO          Review of Systems   Constitutional: Negative.  Negative for fever.   Gastrointestinal: Negative.    Genitourinary: Positive for dysuria, frequency, urgency and vaginal discharge.   Musculoskeletal: Positive for back pain.   Skin: Negative.    All other systems reviewed and are negative.      Physical Exam   BP: (!) 167/107  Pulse: 88  Temp: 98.1  F (36.7  C)  Resp: 16  SpO2: 100 %      Physical Exam  Constitutional:       General: She is not in acute distress.     Appearance: Normal appearance. She is not ill-appearing, toxic-appearing or diaphoretic.   HENT:      Head: Normocephalic and atraumatic.   Eyes:      Conjunctiva/sclera: Conjunctivae normal.   Neck:      Musculoskeletal: Neck supple.   Pulmonary:      Effort: Pulmonary effort is normal.   Abdominal:      General: There is no distension.      Palpations: Abdomen is soft.      Tenderness: There is no abdominal tenderness. There is no right CVA tenderness, left CVA tenderness, guarding or rebound.   Genitourinary:     Labia:         Right: Tenderness (along right posterior labia) present. No rash or lesion.         Left: No rash, tenderness or lesion.       Vagina: Vaginal discharge present. No lesions.      Cervix: Normal.   Skin:     General: Skin is warm and dry.   Neurological:      Mental Status: She is alert.         ED Course        Procedures      Results for orders placed or performed during the hospital encounter of 12/05/19 (from the past 24 hour(s))   UA with Microscopic   Result  Value Ref Range    Color Urine Yellow     Appearance Urine Clear     Glucose Urine Negative NEG^Negative mg/dL    Bilirubin Urine Negative NEG^Negative    Ketones Urine Negative NEG^Negative mg/dL    Specific Gravity Urine 1.008 1.003 - 1.035    Blood Urine Small (A) NEG^Negative    pH Urine 6.0 5.0 - 7.0 pH    Protein Albumin Urine Negative NEG^Negative mg/dL    Urobilinogen mg/dL 0.0 0.0 - 2.0 mg/dL    Nitrite Urine Negative NEG^Negative    Leukocyte Esterase Urine Moderate (A) NEG^Negative    Source Midstream Urine     WBC Urine 54 (H) 0 - 5 /HPF    RBC Urine 8 (H) 0 - 2 /HPF    Bacteria Urine Few (A) NEG^Negative /HPF    Squamous Epithelial /HPF Urine 1 0 - 1 /HPF   HCG qualitative urine (UPT)   Result Value Ref Range    HCG Qual Urine Negative NEG^Negative   Wet prep   Result Value Ref Range    Specimen Description Vagina     Wet Prep Clue cells seen (A)     Wet Prep WBC'S seen  Rare       Wet Prep No Trichomonas seen     Wet Prep No yeast seen        Medications - No data to display    Assessments & Plan (with Medical Decision Making)     Pt is a 44 year old female who presents with complaints of progressive burning with urination and increased urinary urgency and frequency.  Patient states she was evaluated approximately 2 months ago and had a negative urinalysis at that she feels as if her symptoms have been progressive since that time.  Patient also complains of associated vaginal discharge.  Pt is afebrile on arrival.  Exam as above.  Urinalysis shows signs of infection.  Urine was sent for culture.  Wet prep positive for clue cells.  Discussed results with patient.  Return precautions were reviewed.  Hand-outs were provided.    Patient was sent with Cefdinir for UTI and Flagyl for BV and was instructed to follow-up with PCP if no improvement in 5-7 days for continued care and management or sooner if new or worsening symptoms.  She is to return to the ED for persistent and/or worsening symptoms.  Patient  expressed understanding of the diagnosis and plan and was discharged home in good condition.    I have reviewed the nursing notes.    I have reviewed the findings, diagnosis, plan and need for follow up with the patient.    Discharge Medication List as of 12/5/2019  1:41 PM      START taking these medications    Details   cefdinir (OMNICEF) 300 MG capsule Take 1 capsule (300 mg) by mouth 2 times daily for 7 days, Disp-14 capsule, R-0, E-Prescribe      metroNIDAZOLE (FLAGYL) 500 MG tablet Take 1 tablet (500 mg) by mouth 2 times daily for 7 days, Disp-14 tablet, R-0, E-PrescribeEat yogurt or cottage cheese daily to prevent diarrhea that can be caused by taking this antibiotic.             Final diagnoses:   Urinary tract infection without hematuria, site unspecified   Bacterial vaginosis       12/5/2019   [facility] EMERGENCY DEPARTMENT      Disclaimer:  This note consists of symbols derived from keyboarding, dictation and/or voice recognition software.  As a result, there may be errors in the script that have gone undetected.  Please consider this when interpreting information found in this chart.     Nayla Baker PA-C  12/05/19 [id number]

## 2019-12-05 NOTE — ED AVS SNAPSHOT
[facility] Emergency Department  [address] [address]  Phone:  [phone]  Fax:  [phone]                                    Olesya Cintron   MRN: [id number]    Department:  [facility] Emergency Department   Date of Visit:  12/5/2019           After Visit Summary Signature Page    I have received my discharge instructions, and my questions have been answered. I have discussed any challenges I see with this plan with the nurse or doctor.    ..........................................................................................................................................  Patient/Patient Representative Signature      ..........................................................................................................................................  Patient Representative Print Name and Relationship to Patient    ..................................................               ................................................  Date                                   Time    ..........................................................................................................................................  Reviewed by Signature/Title    ...................................................              ..............................................  Date                                               Time          22EPIC Rev 08/18

## 2019-12-05 NOTE — RESULT ENCOUNTER NOTE
Emergency Dept/Urgent Care discharge antibiotic (if prescribed): Cefdinir (Omnicef) 300 mg capsule, 1 capsule (300 mg) by mouth 2 times daily for 7 days  Date of Rx (if applicable):  12/5/19  No changes in treatment per Urine culture protocol.

## 2019-12-07 LAB
BACTERIA SPEC CULT: ABNORMAL
Lab: ABNORMAL
SPECIMEN SOURCE: ABNORMAL

## 2019-12-14 ENCOUNTER — HOSPITAL ENCOUNTER (EMERGENCY)
Facility: CLINIC | Age: 44
Discharge: HOME | End: 2019-12-14
Attending: PHYSICIAN ASSISTANT | Admitting: PHYSICIAN ASSISTANT
Payer: COMMERCIAL

## 2019-12-14 VITALS
HEART RATE: 101 BPM | BODY MASS INDEX: 23.81 KG/M2 | SYSTOLIC BLOOD PRESSURE: 139 MMHG | RESPIRATION RATE: 16 BRPM | DIASTOLIC BLOOD PRESSURE: 91 MMHG | TEMPERATURE: 98.7 F | OXYGEN SATURATION: 100 % | HEIGHT: 67 IN

## 2019-12-14 DIAGNOSIS — N90.7 LABIAL CYST: ICD-10-CM

## 2019-12-14 LAB
SPECIMEN SOURCE: NORMAL
WET PREP SPEC: NORMAL

## 2019-12-14 PROCEDURE — 87210 SMEAR WET MOUNT SALINE/INK: CPT | Performed by: PHYSICIAN ASSISTANT

## 2019-12-14 PROCEDURE — 99213 OFFICE O/P EST LOW 20 MIN: CPT | Mod: Z6 | Performed by: PHYSICIAN ASSISTANT

## 2019-12-14 PROCEDURE — G0463 HOSPITAL OUTPT CLINIC VISIT: HCPCS | Performed by: PHYSICIAN ASSISTANT

## 2019-12-14 NOTE — ED AVS SNAPSHOT
[facility] Emergency Department  [address] [address]  Phone:  [phone]  Fax:  [phone]                                    Olesya Cintron   MRN: [id number]    Department:  [facility] Emergency Department   Date of Visit:  12/14/2019           After Visit Summary Signature Page    I have received my discharge instructions, and my questions have been answered. I have discussed any challenges I see with this plan with the nurse or doctor.    ..........................................................................................................................................  Patient/Patient Representative Signature      ..........................................................................................................................................  Patient Representative Print Name and Relationship to Patient    ..................................................               ................................................  Date                                   Time    ..........................................................................................................................................  Reviewed by Signature/Title    ...................................................              ..............................................  Date                                               Time          22EPIC Rev 08/18

## 2019-12-15 NOTE — ED PROVIDER NOTES
"  History     Chief Complaint   Patient presents with     Vaginitis     HPI  Olesya Cintron is a 44 year old female who was recently evaluated in the urgent care for dysuria, vaginal pain and treated for acute cystitis, bacterial vaginosis on 12/5/2019 with Flagyl and Omnicef who presents with concern over swelling in the left labial area that has been present for the last several days.  Patient reports that while at antibiotic she did have multiple episodes diarrhea up to 3 times daily which has begun to diminish since finishing medications.  She also reports that she her dysuria, urinary frequency, urgency, have resolved.  She did have some new onset vaginal pruritus which she treated with an over-the-counter antifungal medication with some relief however she continues to complain of persistent swelling, discomfort.  She has not had any associated fever, chills, myalgias, dyspnea, wheezing, vomiting, diarrhea, abdominal pain.  She is monogamous with one male partner and denies any concerns over sexually transmitted infection.      Allergies:  Allergies   Allergen Reactions     Ciprofloxacin Nausea and Vomiting     Penicillin [Penicillins]      Hives       Problem List:    Patient Active Problem List    Diagnosis Date Noted     Lyme disease 07/15/2019     Priority: Medium     Recurrent urinary tract infection 03/29/2016     Priority: Medium     Allergic rhinitis 10/29/2014     Priority: Medium     Problem list name updated by automated process. Provider to review       ADHD (attention deficit hyperactivity disorder) 07/02/2013     Priority: Medium     Vulvar vestibulitis 06/07/2013     Priority: Medium     Pelvic pain in female 05/22/2012     Priority: Medium     CARDIOVASCULAR SCREENING; LDL GOAL LESS THAN 160 10/31/2010     Priority: Medium     History of urinary tract infection 03/31/2008     Priority: Medium     FREQUENT Q2 month infections, \"documented\" by cx at another facility; txed with hydrodistention at " age 6 for frequent UTI  Problem list name updated by automated process. Provider to review       Other alopecia 2008     Priority: Medium      Past Medical History:    Past Medical History:   Diagnosis Date     Depression      Eating disorder      Helicobacter pylori (H. pylori)      History of frequent urinary tract infections      MEDICAL HISTORY OF -      Mononucleosis      Personal history of other malignant neoplasm of skin       labor      S/P LEEP of cervix        Past Surgical History:    Past Surgical History:   Procedure Laterality Date     HC COLONOSCOPY THRU STOMA, DIAGNOSTIC      normal     HC REMOVE TONSILS/ADENOIDS,12+ Y/O       LEEP TX, CERVICAL       SURGICAL HISTORY OF -   age 6    Hydrodistention of bladder     SURGICAL HISTORY OF -       Local excision of skin precancer- foot and ear     UPPER GI ENDOSCOPY      H-pylori     Family History:    Family History   Problem Relation Age of Onset     Thyroid Disease Mother      Hypertension Mother      Lipids Mother      Lipids Father      Hypertension Father      Heart Disease Father         bypass surgery     Gastrointestinal Disease Father         ulcer     Prostate Cancer Paternal Grandfather      Alzheimer Disease Paternal Grandmother      Cardiovascular Maternal Grandfather         MI     Diabetes Maternal Grandfather      Breast Cancer No family hx of      Colon Cancer No family hx of      Social History:  Marital Status:   [2]  Social History     Tobacco Use     Smoking status: Former Smoker     Packs/day: 0.00     Smokeless tobacco: Never Used     Tobacco comment: uses nicotene gum on occassion   Substance Use Topics     Alcohol use: Yes     Comment: one drink per week     Drug use: No      Medications:    albuterol (2.5 MG/3ML) 0.083% nebulizer solution  albuterol (PROAIR HFA/PROVENTIL HFA/VENTOLIN HFA) 108 (90 Base) MCG/ACT Inhaler  amphetamine-dextroamphetamine (ADDERALL XR) 20 MG 24 hr  "capsule  co-enzyme Q-10 100 MG CAPS capsule  cyclobenzaprine (FLEXERIL) 10 MG tablet  Fish Oil-Cholecalciferol (FISH OIL + D3 PO)  fluticasone (FLONASE) 50 MCG/ACT spray  ibuprofen (ADVIL,MOTRIN) 200 MG tablet  methylphenidate (RITALIN) 20 MG tablet  multivitamin, therapeutic with minerals (MULTI-VITAMIN) TABS tablet  norgestimate-ethinyl estradiol (MONO-LINYAH) 0.25-35 MG-MCG tablet  Omega-3 Fatty Acids (FISH OIL) 1200 MG capsule  omeprazole (PRILOSEC) 20 MG CR capsule  order for DME  traZODone (DESYREL) 25 MG TABS  VITAMIN D, CHOLECALCIFEROL, PO      Review of Systems  CONSTITUTIONAL:NEGATIVE for fever, chills, change in weight  INTEGUMENTARY/SKIN: NEGATIVE for worrisome rashes, moles or lesions  RESP:NEGATIVE for significant cough or SOB  GI: NEGATIVE for nausea, vomiting, diarrhea, abdominal pain  : POSITIVE for labial swelling, resolved vaginal pruritis NEGATIVE for dysuria, frequency, urgency, hematuria, vaginal discharge   Physical Exam   BP: (!) 139/91  Pulse: 101  Temp: 98.7  F (37.1  C)  Resp: 16  Height: 170.2 cm (5' 7\")  SpO2: 100 %  Physical Exam  GENERAL APPEARANCE: healthy, alert and no distress  RESP: lungs clear to auscultation - no rales, rhonchi or wheezes  CV: regular rates and rhythm, normal S1 S2, no murmur noted  ABDOMEN:  soft, nontender, no HSM or masses and bowel sounds normal  BACK: No CVA tenderness  GU_female: left inguinal lymphadenopathy.  Patient has tenderness to palpation, swelling of the left labia, no surrounding erythema, discharge, no focal mass that was amenable to drainage at that time.  There is thick white material present in vagina consistent with suppository patient had used to treat suspected yeast infection.   Cervix was normal.  No cervical motion tenderness.  SKIN: no suspicious lesions or rashes  ED Course        Procedures        Critical Care time:  none        Results for orders placed or performed during the hospital encounter of 12/14/19 (from the past 24 " hour(s))   Wet prep   Result Value Ref Range    Specimen Description Vagina     Wet Prep No yeast seen     Wet Prep No Trichomonas seen     Wet Prep No clue cells seen     Wet Prep Rare  WBC'S seen        Medications - No data to display    Assessments & Plan (with Medical Decision Making)     I have reviewed the nursing notes.  I have reviewed the findings, diagnosis, plan and need for follow up with the patient.       Discharge Medication List as of 12/14/2019  7:38 PM        Final diagnoses:   Labial cyst     44-year-old female presents to the urgent care with concern over swelling in the the last 3 to 5 days after finishing antibiotics for urinary tract infection and bacterial vaginosis with Omnicef and Flagyl.  She had minimal tachycardia upon arrival however heart rate had normalized at time of examination.  Physical exam findings as described above are consistent with labial cyst.  I did discuss her/benefits of attempting incision and drainage and patient agreed to defer given size and location of lesion.  She did have repeat wet prep which was negative for yeast, trichomonas, clue cells.  She was discharged home stable with instructions for symptomatic treatment with sitz baths.  Follow up with PCP or OB/GYN if no improvement in 3-5 days.  Worrisome reasons to return to ER/UC sooner discussed.     Disclaimer: This note consists of symbols derived from keyboarding, dictation, and/or voice recognition software. As a result, there may be errors in the script that have gone undetected.  Please consider this when interpreting information found in the chart.    12/14/2019   [facility] EMERGENCY DEPARTMENT     Isaura Ferreira PA-C  12/14/19 2024

## 2020-01-02 ENCOUNTER — OFFICE VISIT (OUTPATIENT)
Dept: FAMILY MEDICINE | Facility: CLINIC | Age: 45
End: 2020-01-02
Payer: COMMERCIAL

## 2020-01-02 VITALS
BODY MASS INDEX: 23.54 KG/M2 | DIASTOLIC BLOOD PRESSURE: 88 MMHG | RESPIRATION RATE: 14 BRPM | WEIGHT: 150 LBS | SYSTOLIC BLOOD PRESSURE: 128 MMHG | OXYGEN SATURATION: 98 % | HEART RATE: 84 BPM | TEMPERATURE: 98.2 F | HEIGHT: 67 IN

## 2020-01-02 DIAGNOSIS — J01.01 ACUTE RECURRENT MAXILLARY SINUSITIS: Primary | ICD-10-CM

## 2020-01-02 PROCEDURE — 99213 OFFICE O/P EST LOW 20 MIN: CPT | Performed by: NURSE PRACTITIONER

## 2020-01-02 RX ORDER — PREDNISONE 20 MG/1
40 TABLET ORAL DAILY
Qty: 10 TABLET | Refills: 0 | Status: SHIPPED | OUTPATIENT
Start: 2020-01-02 | End: 2020-01-07

## 2020-01-02 ASSESSMENT — MIFFLIN-ST. JEOR: SCORE: 1358.03

## 2020-01-02 NOTE — PROGRESS NOTES
Subjective     Olesya Cintron is a 44 year old female who presents to clinic today for the following health issues:    HPI   ENT Symptoms             Symptoms: cc Present Absent Comment   Fever/Chills   x    Fatigue  x     Muscle Aches   x    Eye Irritation   x    Sneezing  x     Nasal Amandeep/Drg  x     Sinus Pressure/Pain  x     Loss of smell  x     Dental pain   x    Sore Throat   x    Swollen Glands   x    Ear Pain/Fullness  x  Pressure    Cough  x     Wheeze   x    Chest Pain  x     Shortness of breath   x    Rash   x    Other         Symptom duration:  x 5 days    Symptom severity:  moderate    Treatments tried:  Netti pot, nasal spray, mucinex, advil, tylenol, peppermint and tea tree oil.    Contacts:  none.      Symptoms have worsened more right side than left.  She reports more coughing and chest pressure/tightness.    Patient Active Problem List   Diagnosis     History of urinary tract infection     Other alopecia     CARDIOVASCULAR SCREENING; LDL GOAL LESS THAN 160     Pelvic pain in female     Vulvar vestibulitis     ADHD (attention deficit hyperactivity disorder)     Allergic rhinitis     Recurrent urinary tract infection     Lyme disease     Past Surgical History:   Procedure Laterality Date     HC COLONOSCOPY THRU STOMA, DIAGNOSTIC  8/07    normal     HC REMOVE TONSILS/ADENOIDS,11+ Y/O       LEEP TX, CERVICAL  1995     SURGICAL HISTORY OF -   age 6    Hydrodistention of bladder     SURGICAL HISTORY OF -       Local excision of skin precancer- foot and ear     UPPER GI ENDOSCOPY  8/07    H-pylori       Social History     Tobacco Use     Smoking status: Former Smoker     Packs/day: 0.00     Smokeless tobacco: Never Used     Tobacco comment: uses nicotene gum on occassion   Substance Use Topics     Alcohol use: Yes     Comment: one drink per week     Family History   Problem Relation Age of Onset     Thyroid Disease Mother      Hypertension Mother      Lipids Mother      Lipids Father      Hypertension  Father      Heart Disease Father         bypass surgery     Gastrointestinal Disease Father         ulcer     Alzheimer Disease Father      Prostate Cancer Paternal Grandfather      Alzheimer Disease Paternal Grandmother      Cardiovascular Maternal Grandfather         MI     Diabetes Maternal Grandfather      Breast Cancer No family hx of      Colon Cancer No family hx of          Current Outpatient Medications   Medication Sig Dispense Refill     amphetamine-dextroamphetamine (ADDERALL XR) 20 MG 24 hr capsule Take 20 mg by mouth daily       co-enzyme Q-10 100 MG CAPS capsule Take by mouth daily       cyclobenzaprine (FLEXERIL) 10 MG tablet Take 0.5-1 tablets (5-10 mg) by mouth 3 times daily as needed for muscle spasms 30 tablet 1     Fish Oil-Cholecalciferol (FISH OIL + D3 PO)        fluticasone (FLONASE) 50 MCG/ACT spray USE ONE TO TWO SPRAYS IN BOTH NOSTRILS ONCE DAILY AS NEEDED FOR RHINITIS OR ALLERGIES 16 g 1     ibuprofen (ADVIL,MOTRIN) 200 MG tablet Take 200 mg by mouth every 4 hours as needed for mild pain       multivitamin, therapeutic with minerals (MULTI-VITAMIN) TABS tablet Take 1 tablet by mouth daily       norgestimate-ethinyl estradiol (MONO-LINYAH) 0.25-35 MG-MCG tablet Take 1 tablet by mouth daily 84 tablet 3     Omega-3 Fatty Acids (FISH OIL) 1200 MG capsule Take 1,200 mg by mouth daily       omeprazole (PRILOSEC) 20 MG CR capsule Take 1 capsule (20 mg) by mouth daily 90 capsule 3     traZODone (DESYREL) 25 MG TABS Take 50 mg by mouth At Bedtime 1 to 1 1/2 tablets at bedtime       VITAMIN D, CHOLECALCIFEROL, PO Take by mouth daily       albuterol (2.5 MG/3ML) 0.083% nebulizer solution Take 1 vial (2.5 mg) by nebulization every 6 hours as needed for shortness of breath / dyspnea or wheezing (Patient not taking: Reported on 3/13/2019) 30 vial 1     albuterol (PROAIR HFA/PROVENTIL HFA/VENTOLIN HFA) 108 (90 Base) MCG/ACT Inhaler Inhale 2 puffs into the lungs every 6 hours as needed for shortness of  "breath / dyspnea or wheezing (Patient not taking: Reported on 6/25/2019) 1 Inhaler 3     methylphenidate (RITALIN) 20 MG tablet Take 20 mg by mouth 2 times daily       order for DME Equipment being ordered: Valved chamber spacer, use with inhaler. (Patient not taking: Reported on 6/25/2019) 1 Device 0     Allergies   Allergen Reactions     Ciprofloxacin Nausea and Vomiting     Penicillin [Penicillins]      Hives       Recent Labs   Lab Test 07/14/19  2101 07/09/18  0839 01/05/18  0924 05/12/17  1546 03/05/15  1058 03/05/15  1053   LDL  --  90  --   --   --   --    HDL  --  58  --   --   --   --    TRIG  --  249*  --   --   --   --    ALT 42  --  19  --   --  23   CR 0.82  --  0.77  --   --  0.90   GFRESTIMATED 86  --  82  --   --  69   GFRESTBLACK >90  --  >90  --   --  84   POTASSIUM 3.4  --  4.1  --   --  3.8   TSH  --   --   --  1.10 1.29  --       BP Readings from Last 3 Encounters:   01/02/20 128/88   12/14/19 (!) 139/91   12/05/19 (!) 167/107    Wt Readings from Last 3 Encounters:   01/02/20 68 kg (150 lb)   11/08/19 68.9 kg (152 lb)   10/04/19 63.5 kg (140 lb)                    Reviewed and updated as needed this visit by Provider  Problems         Review of Systems   ROS COMP: Constitutional, HEENT, cardiovascular, pulmonary, GI, , musculoskeletal, neuro, skin, endocrine and psych systems are negative, except as otherwise noted.      Objective    /88 (BP Location: Left arm, Patient Position: Sitting, Cuff Size: Adult Regular)   Pulse 84   Temp 98.2  F (36.8  C) (Tympanic)   Resp 14   Ht 1.702 m (5' 7\")   Wt 68 kg (150 lb)   SpO2 98%   BMI 23.49 kg/m    Body mass index is 23.49 kg/m .  Physical Exam   GENERAL: healthy, alert and no distress  HENT: normal cephalic/atraumatic, ear canals and TM's normal, nose and mouth without ulcers or lesions, oropharynx clear, oral mucous membranes moist and sinuses: maxillary, frontal tenderness on bilateral  NECK: no adenopathy, no asymmetry, masses, or " scars and thyroid normal to palpation  RESP: lungs clear to auscultation - no rales, rhonchi or wheezes  CV: regular rate and rhythm, normal S1 S2, no S3 or S4, no murmur, click or rub, no peripheral edema and peripheral pulses strong  ABDOMEN: soft, nontender, no hepatosplenomegaly, no masses and bowel sounds normal  MS: no gross musculoskeletal defects noted, no edema    Diagnostic Test Results:  Labs reviewed in Epic        Assessment & Plan     1. Acute recurrent maxillary sinusitis   Hold antibiotic and only fill if not improved over the next 4-5 days.  The risks, benefits and treatment options of prescribed medications or other treatments have been discussed with the patient. The patient verbalized their understanding and should call or follow up if no improvement or if they develop further problems.        - amoxicillin-clavulanate (AUGMENTIN) 875-125 MG tablet; Take 1 tablet by mouth 2 times daily for 10 days  Dispense: 20 tablet; Refill: 0  - predniSONE (DELTASONE) 20 MG tablet; Take 2 tablets (40 mg) by mouth daily for 5 days  Dispense: 10 tablet; Refill: 0       Patient Instructions   Sinus infections -   Typically diagnosed based on symptoms - typically facial pain/pressure- lasting more than 5-7 days, often associated with fever, runny nose/congestion.    Drainage and color of drainage does not necessarily determine infection.    Treatment of symptoms -   1.  Hot pack (warm washcloth or similar) to the face  2. Ibuprofen 600 mg every 6 hours  3. Nasal saline - spray to nostrils up to every few hours to help clear drainage  4. Decongestants - like sudafed or similar product, talk to the pharmacist if you have blood pressure problems.    5.  Antibiotics can be helpful for some cases but a large percentage is viral or allergy related.  If given antibiotics you should take the entire course even if you start to feel better.    6.  Treat any underlying allergies with antihistamines like claritin or zyrtec at  the first sign of pressure or drainage if you have a history of sinus problems.     STEPHENIE Caraballo        Return if symptoms worsen or fail to improve.    Bere Harris NP  [facility]

## 2020-01-02 NOTE — PATIENT INSTRUCTIONS
Sinus infections -   Typically diagnosed based on symptoms - typically facial pain/pressure- lasting more than 5-7 days, often associated with fever, runny nose/congestion.    Drainage and color of drainage does not necessarily determine infection.    Treatment of symptoms -   1.  Hot pack (warm washcloth or similar) to the face  2. Ibuprofen 600 mg every 6 hours  3. Nasal saline - spray to nostrils up to every few hours to help clear drainage  4. Decongestants - like sudafed or similar product, talk to the pharmacist if you have blood pressure problems.    5.  Antibiotics can be helpful for some cases but a large percentage is viral or allergy related.  If given antibiotics you should take the entire course even if you start to feel better.    6.  Treat any underlying allergies with antihistamines like claritin or zyrtec at the first sign of pressure or drainage if you have a history of sinus problems.     Bere Harris, SANDRAP

## 2020-01-29 ENCOUNTER — TELEPHONE (OUTPATIENT)
Dept: FAMILY MEDICINE | Facility: CLINIC | Age: 45
End: 2020-01-29

## 2020-01-30 NOTE — TELEPHONE ENCOUNTER
Prior Authorization Retail Medication Request    Medication/Dose: Mydayis 37.5mg ER Cap  ICD code (if different than what is on RX):  NA  Previously Tried and Failed:    Rationale:  N/A    Insurance Name:  [employer] ROONEY ([phone])  Insurance ID:  [id number]      Pharmacy Information (if different than what is on RX)  Name:   [facility]  Phone:  [phone]    Please submit a prior auth for the Mydayis Rx.  Thanks!!!    - [facility]

## 2020-02-03 NOTE — TELEPHONE ENCOUNTER
Central Prior Authorization Team   Phone: [phone]      PA Initiation    Medication: Mydayis 37.5mg ER Cap  Insurance Company: Comment:  Arnav  [phone]  Pharmacy Filling the Rx: [facility] [place], [place] - [address] [facility]  Filling Pharmacy Phone: [phone]  Filling Pharmacy Fax:    Start Date: 2/3/2020      Initiated PA over the phone.

## 2020-02-04 NOTE — TELEPHONE ENCOUNTER
Prior Authorization Approval    Authorization Effective Date: 2/4/2020  Authorization Expiration Date: 1/29/2023  Medication: Mydayis 37.5mg ER Cap  Approved Dose/Quantity:    Reference #:     Insurance Company: Comment:  Arnav  [phone]  Expected CoPay:       CoPay Card Available:      Foundation Assistance Needed:    Which Pharmacy is filling the prescription (Not needed for infusion/clinic administered): [facility] - [place], [place] - [facility] [facility]  Pharmacy Notified: Yes  Patient Notified: Yes **Instructed pharmacy to notify patient when script is ready to /ship.**

## 2020-04-08 ENCOUNTER — VIRTUAL VISIT (OUTPATIENT)
Dept: FAMILY MEDICINE | Facility: OTHER | Age: 45
End: 2020-04-08

## 2020-04-09 NOTE — PROGRESS NOTES
"Date: 2020 18:00:02  Clinician: Conrado Jaimes  Clinician NPI: [id number]  Patient: Olesya Cintron  Patient : 1975  Patient Address: [address] [place] [address] [place] [address]  Patient Phone: [phone]  Visit Protocol: Tick bites  Patient Summary:  Olesya is a 44 year old ( : 1975 ) female who initiated a Visit for evaluation of a Deer tick bite/Lyme disease prevention. When asked the question \"Please sign me up to receive news, health information and promotions from [employer].\", Olesya responded \"No\".   A synchronous visit is necessary because the patient reported the following abnormal symptoms:    Swelling, redness, and warmth around bite (requires clarification)   Olesya uploaded images of the affected area of her skin.   Olesya was bitten by a tick that is no longer attached. The tick was attached for less than 36 hours. It was removed or fell off less than 3 days ago. The location where the tick bite occurred as reported by the patient (free-text): [address].  Olesya states the tick resembled an image of a black-legged deer tick.   She has a rash that resembles a target. The affected area of the skin is swollen, red, and warm to the touch.   Symptom Details:   Bull's-eye rash: The rash has expanded since the tick bite occurred.    Symptoms denied:     New pain, redness or swelling of of the joints    Feeling feverish     Olesya does not smoke or use smokeless tobacco.   She denies pregnancy and denies breastfeeding. She has menstruated in the past month.   She weighs approximately 140 lbs.   Additional information as reported by the patient (free text): I went into the ER in July with high fever and rash, I had tested positive for Lymes and tick was still attached   Weight: 140 lbs    MEDICATIONS: cefuroxime axetil oral, Sprintec (28) oral, Fish Oil oral, Complete Multivitamin-Multimineral oral, methylphenidate HCl oral, trazodone oral, ALLERGIES: Penicillins  Clinician Response:  Dear Olesya,   Please take " full 10 day course of the antibiotic for skin infection, you can extend the antibiotic for total of 3 weeks duration for Lyme's treatment.    Diagnosis: Other specified local infections of the skin and subcutaneous tissue  Diagnosis ICD: L08.89  Additional Clinician Notes: Tick bite area is concerning for skin infection.  As this appears to be a deer tick, I will start you on Doxycycline to cover for both Lyme's and skin infection, the duration for skin infection is 10 days, Lyme's treatment is at least 3 weeks ( I have ordered the antibiotic for 3 weeks duration).  It is okay to take tylenol and ibuprofen for discomfort.  Monitor rash and follow up with primary provider if no improvement.    Please use back up methods for contraception as antibiotics can decrease the effectiveness of birth control.  Synchronous Triage: phone, status: completed, duration: 273 seconds  Prescription: doxycycline hyclate (Vibramycin) 100 mg oral capsule 42 capsule, 21 days supply. Take 1 capsule by mouth 2 times per day for 21 days. Refills: 0, Refill as needed: no, Allow substitutions: yes  Pharmacy: [facility] - [phone] - [address] [place] [address]

## 2020-05-20 ENCOUNTER — MYC MEDICAL ADVICE (OUTPATIENT)
Dept: FAMILY MEDICINE | Facility: CLINIC | Age: 45
End: 2020-05-20

## 2020-05-20 DIAGNOSIS — Z30.41 ENCOUNTER FOR SURVEILLANCE OF CONTRACEPTIVE PILLS: ICD-10-CM

## 2020-05-20 RX ORDER — NORGESTIMATE AND ETHINYL ESTRADIOL 0.25-0.035
KIT ORAL
Qty: 84 TABLET | Refills: 0 | Status: SHIPPED | OUTPATIENT
Start: 2020-05-20 | End: 2020-08-04

## 2020-08-04 ENCOUNTER — OFFICE VISIT (OUTPATIENT)
Dept: FAMILY MEDICINE | Facility: CLINIC | Age: 45
End: 2020-08-04
Payer: COMMERCIAL

## 2020-08-04 VITALS
HEART RATE: 74 BPM | RESPIRATION RATE: 15 BRPM | SYSTOLIC BLOOD PRESSURE: 118 MMHG | TEMPERATURE: 97.6 F | OXYGEN SATURATION: 98 % | DIASTOLIC BLOOD PRESSURE: 82 MMHG | WEIGHT: 146.9 LBS | HEIGHT: 67 IN | BODY MASS INDEX: 23.06 KG/M2

## 2020-08-04 DIAGNOSIS — Z13.6 CARDIOVASCULAR SCREENING; LDL GOAL LESS THAN 160: ICD-10-CM

## 2020-08-04 DIAGNOSIS — G44.209 TENSION HEADACHE: ICD-10-CM

## 2020-08-04 DIAGNOSIS — M54.2 NECK PAIN: ICD-10-CM

## 2020-08-04 DIAGNOSIS — Z30.41 ENCOUNTER FOR SURVEILLANCE OF CONTRACEPTIVE PILLS: ICD-10-CM

## 2020-08-04 DIAGNOSIS — Z12.31 ENCOUNTER FOR SCREENING MAMMOGRAM FOR BREAST CANCER: ICD-10-CM

## 2020-08-04 DIAGNOSIS — M54.2 CERVICALGIA: ICD-10-CM

## 2020-08-04 DIAGNOSIS — Z00.00 ROUTINE GENERAL MEDICAL EXAMINATION AT A HEALTH CARE FACILITY: Primary | ICD-10-CM

## 2020-08-04 DIAGNOSIS — M26.623 BILATERAL TEMPOROMANDIBULAR JOINT PAIN: ICD-10-CM

## 2020-08-04 PROCEDURE — 99213 OFFICE O/P EST LOW 20 MIN: CPT | Mod: 25 | Performed by: NURSE PRACTITIONER

## 2020-08-04 PROCEDURE — 99396 PREV VISIT EST AGE 40-64: CPT | Performed by: NURSE PRACTITIONER

## 2020-08-04 RX ORDER — CYCLOBENZAPRINE HCL 10 MG
5-10 TABLET ORAL 2 TIMES DAILY PRN
Qty: 30 TABLET | Refills: 1 | Status: SHIPPED | OUTPATIENT
Start: 2020-08-04 | End: 2022-06-24

## 2020-08-04 RX ORDER — NORGESTIMATE AND ETHINYL ESTRADIOL 0.25-0.035
1 KIT ORAL DAILY
Qty: 84 TABLET | Refills: 3 | Status: SHIPPED | OUTPATIENT
Start: 2020-08-04 | End: 2021-08-02

## 2020-08-04 ASSESSMENT — MIFFLIN-ST. JEOR: SCORE: 1338.96

## 2020-08-04 NOTE — PATIENT INSTRUCTIONS
Preventive Health Recommendations  Female Ages 40 to 49    Yearly exam:     See your health care provider every year in order to  1. Review health changes.   2. Discuss preventive care.    3. Review your medicines if your doctor prescribed any.      Get a Pap test every three years (unless you have an abnormal result and your provider advises testing more often).      If you get Pap tests with HPV test, you only need to test every 5 years, unless you have an abnormal result. You do not need a Pap test if your uterus was removed (hysterectomy) and you have not had cancer.      You should be tested each year for STDs (sexually transmitted diseases), if you're at risk.     Ask your doctor if you should have a mammogram.      Have a colonoscopy (test for colon cancer) if someone in your family has had colon cancer or polyps before age 50.       Have a cholesterol test every 5 years.       Have a diabetes test (fasting glucose) after age 45. If you are at risk for diabetes, you should have this test every 3 years.    Shots: Get a flu shot each year. Get a tetanus shot every 10 years.     Nutrition:     Eat at least 5 servings of fruits and vegetables each day.    Eat whole-grain bread, whole-wheat pasta and brown rice instead of white grains and rice.    Get adequate Calcium and Vitamin D.      Lifestyle    Exercise at least 150 minutes a week (an average of 30 minutes a day, 5 days a week). This will help you control your weight and prevent disease.    Limit alcohol to one drink per day.    No smoking.     Wear sunscreen to prevent skin cancer.    See your dentist every six months for an exam and cleaning.        Patient Education     TMJ Syndrome  The temporomandibular joint (TMJ) is the joint that connects your lower jaw to your head. You can feel it in front of your ears when you open and close your mouth. TMJ disorders involve chronic or recurrent pain in the joint. When treated, symptoms of TMJ disorders usually  go away within a few months.  Causes  There is no widely agreed-on cause of TMJ disorders. They have been linked to injury, arthritis, chronic fatigue syndrome, and fibromyalgia. A definite connection has not been shown, though.  Symptoms    Pain in the face, jaw, or neck    Pain with jaw movement or chewing    Locking or catching sensation of the jaw    Clicking, popping, or grinding sounds with movement of the TMJ    Headache    Ear pain  Home care  Modest, nonsurgical treatments are a good first step toward relieving symptoms. Try the approaches described below.    Rest the jaw by avoiding crunchy or hard-to-chew foods. Don t eat hard or sticky candies. Soft foods and liquids are easier on the jaw.    Protect your jaw while yawning. If you need to yawn, put your fist under your chin to prevent your mouth from opening up too wide.    To help relieve pain, try applying hot or cold packs to the painful area. Try both hot and cold to find out which works best for you. To make a cold pack, put ice cubes in a plastic bag that seals at the top. Wrap the bag in a clean, thin towel or cloth. Never put ice or an ice pack directly on the skin. If you use hot packs (small towels soaked in hot water), be careful not to burn yourself.    You may take acetaminophen or ibuprofen for pain, unless you were given a different pain medicine. (Note: If you have chronic liver or kidney disease or have ever had a stomach ulcer or gastrointestinal bleeding, talk with your healthcare provider before using these medicines. Also talk to your provider if you are taking medicine to prevent blood clots.) Don t give aspirin to a child younger than age 19 unless directed by the child s provider. Taking aspirin can put a child at risk for Reye syndrome. This is a rare but very serious disorder that most often affects the brain and the liver.  Reducing stress  If stress seems to be contributing to your symptoms, try to identify the sources of  stress in your life. These aren t always obvious. Common stressors include:    Everyday hassles. These include things such as traffic jams, missed appointments, or car trouble.    Major life changes. These can be good, such as a new baby or job promotion. And they can be bad, such as losing a job or losing a loved one.    Overload. The feeling that you have too many responsibilities and can't take care of everything at once.    Helplessness. Feeling like your problems are more than you can solve.  When possible, do something about your sources of stress. See if you can avoid hassles, limit the amount of change in your life at one time, and take breaks when you feel overloaded.  Unfortunately, many stressful situations cannot be avoided. So learning how to manage stress better is very important. Getting regular exercise, eating nutritious, balanced meals, and getting adequate rest all help to make everyday stress more manageable. Certain techniques are also helpful: relaxation and breathing exercises, visualization, biofeedback, meditation, or simply taking some time out to clear your mind. For more information, talk with your healthcare provider.  Follow-up care  Follow up with your healthcare provider, or as advised. Further testing and additional treatment may be required. If changes to your lifestyle do not improve your symptoms, talk with your healthcare provider about other available therapies. These include bite guards for help with teeth grinding, stress management techniques, and more. If stress is an important factor and does not respond to the above simple measures, talk with your healthcare provider about a referral for stress management.  If X-rays were done, they will be reviewed by a specialist. You will be notified of the results, especially if they affect treatment.  Call 911  Call 911 if any of these occur:    Trouble breathing or swallowing, wheezing    Confusion    Extreme drowsiness or trouble  awakening    Fainting or loss of consciousness    Rapid heart rate  When to seek medical advice  Call your healthcare provider right away if any of these occur:    Swollen or red face    Pain gets worse    Neck, mouth, tooth, or throat pain gets worse    Fever of 100.4 F (38 C) or higher, or as directed by your healthcare provider  Date Last Reviewed: 10/1/2017    2363-2221 [employer]. [address] [place] [address]. All rights reserved. This information is not intended as a substitute for professional medical care. Always follow your healthcare professional's instructions.

## 2020-08-04 NOTE — PROGRESS NOTES
SUBJECTIVE:   CC: Olesya Cintron is an 45 year old woman who presents for preventive health visit.     Healthy Habits:    Getting at least 3 servings of Calcium per day:  Yes    Bi-annual eye exam:  Yes    Dental care twice a year:  Yes    Sleep apnea or symptoms of sleep apnea:  None    Diet:  Regular (no restrictions)    Frequency of exercise:  4-5 days/week    Duration of exercise:  15-30 minutes    Taking medications regularly:  Yes    Barriers to taking medications:  None    Medication side effects:  None    PHQ-2 Total Score:    Additional concerns today:  No        Musculoskeletal problem/pain      Duration: jaw pain bilateral - TMJ.    Description  Location: bilateral jaw    Intensity:  mild    Accompanying signs and symptoms: radiation of pain to neck    History  Previous similar problem: YES- clenching noted at night and sometimes during the day  Previous evaluation:  none    Precipitating or alleviating factors:  Trauma or overuse: YES  Aggravating factors include: none    Therapies tried and outcome: massage and stretching      Today's PHQ-2 Score:   PHQ-2 ( 1999 Pfizer) 8/4/2020   Q1: Little interest or pleasure in doing things 0   Q2: Feeling down, depressed or hopeless 0   PHQ-2 Score 0       Abuse: Current or Past(Physical, Sexual or Emotional)- No  Do you feel safe in your environment? Yes        Social History     Tobacco Use     Smoking status: Former Smoker     Packs/day: 0.00     Smokeless tobacco: Never Used     Tobacco comment: uses nicotene gum on occassion   Substance Use Topics     Alcohol use: Yes     Comment: one drink per week     If you drink alcohol do you typically have >3 drinks per day or >7 drinks per week? No    Alcohol Use 3/29/2016   Prescreen: >3 drinks/day or >7 drinks/week? The patient does not drink >3 drinks per day nor >7 drinks per week.   No flowsheet data found.    Reviewed orders with patient.  Reviewed health maintenance and updated orders accordingly - Yes  Lab  work is in process  Labs reviewed in EPIC  BP Readings from Last 3 Encounters:   08/04/20 118/82   01/02/20 128/88   12/14/19 (!) 139/91    Wt Readings from Last 3 Encounters:   08/04/20 66.6 kg (146 lb 14.4 oz)   01/02/20 68 kg (150 lb)   11/08/19 68.9 kg (152 lb)                  Patient Active Problem List   Diagnosis     History of urinary tract infection     Other alopecia     CARDIOVASCULAR SCREENING; LDL GOAL LESS THAN 160     Pelvic pain in female     Vulvar vestibulitis     ADHD (attention deficit hyperactivity disorder)     Allergic rhinitis     Recurrent urinary tract infection     Lyme disease     Past Surgical History:   Procedure Laterality Date     HC COLONOSCOPY THRU STOMA, DIAGNOSTIC  8/07    normal     HC REMOVE TONSILS/ADENOIDS,12+ Y/O       LEEP TX, CERVICAL  1995     SURGICAL HISTORY OF -   age 6    Hydrodistention of bladder     SURGICAL HISTORY OF -       Local excision of skin precancer- foot and ear     UPPER GI ENDOSCOPY  8/07    H-pylori       Social History     Tobacco Use     Smoking status: Former Smoker     Packs/day: 0.00     Smokeless tobacco: Never Used     Tobacco comment: uses nicotene gum on occassion   Substance Use Topics     Alcohol use: Yes     Comment: one drink per week     Family History   Problem Relation Age of Onset     Thyroid Disease Mother      Hypertension Mother      Lipids Mother      Lipids Father      Hypertension Father      Heart Disease Father         bypass surgery     Gastrointestinal Disease Father         ulcer     Alzheimer Disease Father      Prostate Cancer Paternal Grandfather      Alzheimer Disease Paternal Grandmother      Cardiovascular Maternal Grandfather         MI     Diabetes Maternal Grandfather      Breast Cancer No family hx of      Colon Cancer No family hx of          Current Outpatient Medications   Medication Sig Dispense Refill     amphetamine-dextroamphetamine (ADDERALL XR) 20 MG 24 hr capsule Take 20 mg by mouth daily       co-enzyme  Q-10 100 MG CAPS capsule Take by mouth daily       cyclobenzaprine (FLEXERIL) 10 MG tablet Take 0.5-1 tablets (5-10 mg) by mouth 2 times daily as needed for muscle spasms 30 tablet 1     Fish Oil-Cholecalciferol (FISH OIL + D3 PO)        fluticasone (FLONASE) 50 MCG/ACT spray USE ONE TO TWO SPRAYS IN BOTH NOSTRILS ONCE DAILY AS NEEDED FOR RHINITIS OR ALLERGIES 16 g 1     ibuprofen (ADVIL,MOTRIN) 200 MG tablet Take 200 mg by mouth every 4 hours as needed for mild pain       multivitamin, therapeutic with minerals (MULTI-VITAMIN) TABS tablet Take 1 tablet by mouth daily       norgestimate-ethinyl estradiol (MONO-LINYAH) 0.25-35 MG-MCG tablet Take 1 tablet by mouth daily 84 tablet 3     omeprazole (PRILOSEC) 20 MG CR capsule Take 1 capsule (20 mg) by mouth daily 90 capsule 3     traZODone (DESYREL) 25 MG TABS Take 50 mg by mouth At Bedtime 1 to 1 1/2 tablets at bedtime       VITAMIN D, CHOLECALCIFEROL, PO Take by mouth daily       albuterol (2.5 MG/3ML) 0.083% nebulizer solution Take 1 vial (2.5 mg) by nebulization every 6 hours as needed for shortness of breath / dyspnea or wheezing (Patient not taking: Reported on 3/13/2019) 30 vial 1     albuterol (PROAIR HFA/PROVENTIL HFA/VENTOLIN HFA) 108 (90 Base) MCG/ACT Inhaler Inhale 2 puffs into the lungs every 6 hours as needed for shortness of breath / dyspnea or wheezing (Patient not taking: Reported on 6/25/2019) 1 Inhaler 3     methylphenidate (RITALIN) 20 MG tablet Take 20 mg by mouth 2 times daily       Omega-3 Fatty Acids (FISH OIL) 1200 MG capsule Take 1,200 mg by mouth daily       order for DME Equipment being ordered: Valved chamber spacer, use with inhaler. (Patient not taking: Reported on 6/25/2019) 1 Device 0     Allergies   Allergen Reactions     Ciprofloxacin Nausea and Vomiting     Penicillin [Penicillins]      Hives       Recent Labs   Lab Test 07/14/19  2101 07/09/18  0839 01/05/18  0924 05/12/17  1546 03/05/15  1058 03/05/15  1053   LDL  --  90  --   --    --   --    HDL  --  58  --   --   --   --    TRIG  --  249*  --   --   --   --    ALT 42  --  19  --   --  23   CR 0.82  --  0.77  --   --  0.90   GFRESTIMATED 86  --  82  --   --  69   GFRESTBLACK >90  --  >90  --   --  84   POTASSIUM 3.4  --  4.1  --   --  3.8   TSH  --   --   --  1.10 1.29  --         Mammogram Screening: Patient under age 50, mutual decision reflected in health maintenance.      Pertinent mammograms are reviewed under the imaging tab.  History of abnormal Pap smear:   NO - age 30- 65 PAP every 3 years recommended  Last 3 Pap Results:   PAP (no units)   Date Value   2019 NIL   2016 NIL   2015 NIL     PAP / HPV Latest Ref Rng & Units 2019 3/29/2016 2015   PAP - NIL NIL NIL   HPV 16 DNA NEG:Negative Negative Negative -   HPV 18 DNA NEG:Negative Negative Negative -   OTHER HR HPV NEG:Negative Negative Negative -     Reviewed and updated as needed this visit by clinical staff  Tobacco  Allergies  Meds  Med Hx  Surg Hx  Fam Hx  Soc Hx        Reviewed and updated as needed this visit by Provider          Past Medical History:   Diagnosis Date     Depression     age 12     Eating disorder     anorexia age 12     Helicobacter pylori (H. pylori)     txed with abx     History of frequent urinary tract infections      MEDICAL HISTORY OF -     fx tailbone age 12     Mononucleosis     age 11     Personal history of other malignant neoplasm of skin     precancer left ear, left foot      labor ,     S/P LEEP of cervix     path results??      Past Surgical History:   Procedure Laterality Date     HC COLONOSCOPY THRU STOMA, DIAGNOSTIC      normal     HC REMOVE TONSILS/ADENOIDS,12+ Y/O       LEEP TX, CERVICAL       SURGICAL HISTORY OF -   age 6    Hydrodistention of bladder     SURGICAL HISTORY OF -       Local excision of skin precancer- foot and ear     UPPER GI ENDOSCOPY      H-pylori     OB History    Para Term  AB Living   4 3 2  "1 1 3   SAB TAB Ectopic Multiple Live Births   1 0 0 0 3      # Outcome Date GA Lbr Gerardo/2nd Weight Sex Delivery Anes PTL Lv   4 SAB 13              Birth Comments: System Generated. Please review and update pregnancy details.   3  02/21/10 36w0d 02:18 3.118 kg (6 lb 14 oz) M    DEVON      Name: Silvio      Apgar1: 9  Apgar5: 9   2 Term 95 37w0d 04:00 2.722 kg (6 lb) F    DEVON      Birth Comments: bedrest  -elevated bp in labor   1 Term 91 37w0d 04:00 2.637 kg (5 lb 13 oz) M    DEVON      Birth Comments: bedrest  7 months      Obstetric Comments    x 2, PTL with bedrest       Review of Systems  CONSTITUTIONAL: NEGATIVE for fever, chills, change in weight  INTEGUMENTARU/SKIN: NEGATIVE for worrisome rashes, moles or lesions  EYES: NEGATIVE for vision changes or irritation  ENT: NEGATIVE for ear, mouth and throat problems  RESP: NEGATIVE for significant cough or SOB  BREAST: NEGATIVE for masses, tenderness or discharge  CV: NEGATIVE for chest pain, palpitations or peripheral edema  GI: NEGATIVE for nausea, abdominal pain, heartburn, or change in bowel habits  : NEGATIVE for unusual urinary or vaginal symptoms. Periods are regular.  MUSCULOSKELETAL:see HPI above  NEURO: NEGATIVE for weakness, dizziness or paresthesias  PSYCHIATRIC: NEGATIVE for changes in mood or affect     OBJECTIVE:   Pulse 74   Temp 97.6  F (36.4  C) (Tympanic)   Resp 15   Ht 1.702 m (5' 7\")   Wt 66.6 kg (146 lb 14.4 oz)   SpO2 98%   BMI 23.01 kg/m    Physical Exam  GENERAL: healthy, alert and no distress  EYES: Eyes grossly normal to inspection, PERRL and conjunctivae and sclerae normal  HENT: ear canals and TM's normal, nose and mouth without ulcers or lesions  NECK: no adenopathy, no asymmetry, masses, or scars and thyroid normal to palpation  RESP: lungs clear to auscultation - no rales, rhonchi or wheezes  BREAST: normal without masses, tenderness or nipple discharge and no palpable axillary " "masses or adenopathy  CV: regular rate and rhythm, normal S1 S2, no S3 or S4, no murmur, click or rub, no peripheral edema and peripheral pulses strong  ABDOMEN: soft, nontender, no hepatosplenomegaly, no masses and bowel sounds normal  MS: extremities normal- no gross deformities noted; bilateral TM joint tenderness extending into lower jaw line on palpitation. Some clicking noted bilateral with opening/closing.  SKIN: no suspicious lesions or rashes  NEURO: Normal strength and tone, mentation intact and speech normal  PSYCH: mentation appears normal, affect normal/bright    Diagnostic Test Results:  Labs reviewed in Epic    ASSESSMENT/PLAN:   1. Routine general medical examination at a health care facility       2. CARDIOVASCULAR SCREENING; LDL GOAL LESS THAN 160       3. Encounter for screening mammogram for breast cancer     - *MA Screening Digital Bilateral; Future    4. Bilateral temporomandibular joint pain   Discussed mouth guard and follow up with TMJ specialist for recommendations, PT and treatment.  - OTOLARYNGOLOGY REFERRAL    5. Tension headache   Resulting from #4, NSAID, massage/heat to area.  - OTOLARYNGOLOGY REFERRAL    6. Cervicalgia  Resulting from #4 see above.  - OTOLARYNGOLOGY REFERRAL    7. Neck pain     - cyclobenzaprine (FLEXERIL) 10 MG tablet; Take 0.5-1 tablets (5-10 mg) by mouth 2 times daily as needed for muscle spasms  Dispense: 30 tablet; Refill: 1    8. Encounter for surveillance of contraceptive pills     - norgestimate-ethinyl estradiol (MONO-LINYAH) 0.25-35 MG-MCG tablet; Take 1 tablet by mouth daily  Dispense: 84 tablet; Refill: 3    COUNSELING:  Reviewed preventive health counseling, as reflected in patient instructions       Regular exercise       Healthy diet/nutrition    Estimated body mass index is 23.01 kg/m  as calculated from the following:    Height as of this encounter: 1.702 m (5' 7\").    Weight as of this encounter: 66.6 kg (146 lb 14.4 oz).         reports that she " has quit smoking. She smoked 0.00 packs per day. She has never used smokeless tobacco.      Counseling Resources:  ATP IV Guidelines  Pooled Cohorts Equation Calculator  Breast Cancer Risk Calculator  FRAX Risk Assessment  ICSI Preventive Guidelines  Dietary Guidelines for Americans, 2010  USDA's MyPlate  ASA Prophylaxis  Lung CA Screening    Bere Harris NP  [facility]

## 2020-09-09 ENCOUNTER — AMBULATORY - HEALTHEAST (OUTPATIENT)
Dept: INTERNAL MEDICINE | Facility: CLINIC | Age: 45
End: 2020-09-09

## 2020-09-09 ENCOUNTER — VIRTUAL VISIT (OUTPATIENT)
Dept: FAMILY MEDICINE | Facility: OTHER | Age: 45
End: 2020-09-09
Payer: COMMERCIAL

## 2020-09-09 DIAGNOSIS — Z20.822 SUSPECTED 2019 NOVEL CORONAVIRUS INFECTION: ICD-10-CM

## 2020-09-09 PROCEDURE — 99421 OL DIG E/M SVC 5-10 MIN: CPT | Performed by: NURSE PRACTITIONER

## 2020-09-09 NOTE — PROGRESS NOTES
"Date: 2020 11:55:01  Clinician: Chrissy Hackett  Clinician NPI: [id number]  Patient: Olesya Cintron  Patient : 1975  Patient Address: [address] [place] [address] [place] [address]  Patient Phone: [phone]  Visit Protocol: URI  Patient Summary:  Olesya is a 45 year old ( : 1975 ) female who initiated a Visit for COVID-19 (Coronavirus) evaluation and screening. When asked the question \"Please sign me up to receive news, health information and promotions from [email or web address].\", Olesya responded \"No\".    Olesya states her symptoms started 1-2 days ago.   Her symptoms consist of nausea, a sore throat, nasal congestion, a headache, chills, malaise, enlarged lymph nodes, and myalgia. Olesya also feels feverish.   Symptom details     Nasal secretions: The color of her mucus is white.    Sore throat: Olesya reports having moderate throat pain (4-6 on a 10 point pain scale), does not have exudate on her tonsils, and can swallow liquids. The lymph nodes in her neck are enlarged. A rash has not appeared on the skin since the sore throat started.     Temperature: Her current temperature is 99.3 degrees Fahrenheit.     Headache: She states the headache is mild (1-3 on a 10 point pain scale).      Olesya denies having ear pain, anosmia, facial pain or pressure, vomiting, rhinitis, wheezing, teeth pain, ageusia, diarrhea, and cough. She also denies taking antibiotic medication in the past month and having recent facial or sinus surgery in the past 60 days. She is not experiencing dyspnea.   Precipitating events  Within the past week, Olesya has not been exposed to someone with strep throat. She has not recently been exposed to someone with influenza. Olesya has been in close contact with the following high risk individuals: children under the age of 5, adults 65 or older, and people with asthma, heart disease or diabetes.   Pertinent COVID-19 (Coronavirus) information  In the past 14 days, Olesya has not worked in a congregate living setting. "   She does not work or volunteer as healthcare worker or a  and does not work or volunteer in a healthcare facility.   Olesya also has not lived in a congregate living setting in the past 14 days. She does not live with a healthcare worker.   Olesya has not had a close contact with a laboratory-confirmed COVID-19 patient within 14 days of symptom onset.   Since December 2019, Olesya and has not had upper respiratory infection or influenza-like illness. Has not been diagnosed with lab-confirmed COVID-19 test   Pertinent medical history  Olesya had 2 sinus infections within the past year.   Olesya typically gets a yeast infection when she takes antibiotics. She has used fluconazole (Diflucan) to treat previous yeast infections. 2 doses of fluconazole (Diflucan) has typically been needed for symptoms to resolve in the past.  Olesya does not need a return to work/school note.   Weight: 140 lbs   Olesya does not smoke or use smokeless tobacco.   She denies pregnancy and denies breastfeeding. She has menstruated in the past month.   Weight: 140 lbs    MEDICATIONS: dextroamphetamine-amphetamine oral, Fish Oil oral, cefuroxime axetil oral, Complete Multivitamin-Multimineral oral, Sprintec (28) oral, trazodone oral, ALLERGIES: Penicillins, Sulfa (Sulfonamide Antibiotics)  Clinician Response:  Dear Olesya,   Your symptoms show that you may have coronavirus (COVID-19). This illness can cause fever, cough and trouble breathing. Many people get a mild case and get better on their own. Some people can get very sick.  What should I do?  We would like to test you for this virus.   1. Please call [phone] to schedule your visit. Explain that [facility]-19 test. Be ready to share your [facility] visit ID number.  The following will serve as your written order for this COVID Test, ordered by me, for the indication of suspected COVID [Z20.828]: The test will be ordered in [employer], our electronic health  "record, after you are scheduled. It will show as ordered and authorized by Tony Guillen MD.  Order: COVID-19 (Coronavirus) PCR [facility]TOMATIC [facility].      2. When it's time for your COVID test:  Stay at least 6 feet away from others. (If someone will drive you to your test, stay in the backseat, as far away from the  as you can.)   Cover your mouth and nose with a mask, tissue or washcloth.  Go straight to the testing site. Don't make any stops on the way there or back.      3.Starting now: Stay home and away from others (self-isolate) until:   You've had no fever---and no medicine that reduces fever---for one full day (24 hours). And...   Your other symptoms have gotten better. For example, your cough or breathing has improved. And...   At least 10 days have passed since your symptoms started.       During this time, don't leave the house except for testing or medical care.   Stay in your own room, even for meals. Use your own bathroom if you can.   Stay away from others in your home. No hugging, kissing or shaking hands. No visitors.  Don't go to work, school or anywhere else.    Clean \"high touch\" surfaces often (doorknobs, counters, handles, etc.). Use a household cleaning spray or wipes. You'll find a full list of  on the EPA website: www.epa.gov/pesticide-registration/list-n-disinfectants-use-against-sars-cov-2.   Cover your mouth and nose with a mask, tissue or washcloth to avoid spreading germs.  Wash your hands and face often. Use soap and water.  Caregivers in these groups are at risk for severe illness due to COVID-19:  o People 65 years and older  o People who live in a nursing home or long-term care facility  o People with chronic disease (lung, heart, cancer, diabetes, kidney, liver, immunologic)  o People who have a weakened immune system, including those who:   Are in cancer treatment  Take medicine that weakens the immune system, such as corticosteroids  Had a bone marrow " or organ transplant  Have an immune deficiency  Have poorly controlled HIV or AIDS  Are obese (body mass index of 40 or higher)  Smoke regularly   o Caregivers should wear gloves while washing dishes, handling laundry and cleaning bedrooms and bathrooms.  o Use caution when washing and drying laundry: Don't shake dirty laundry, and use the warmest water setting that you can.  o For more tips, go to www.cdc.gov/coronavirus/2019-ncov/downloads/10Things.pdf.    4.Sign up for [employer]. We know it's scary to hear that you might have COVID-19. We want to track your symptoms to make sure you're okay over the next 2 weeks. Please look for an email from [employer]---this is a free, online program that we'll use to keep in touch. To sign up, follow the link in the email. Learn more at [email or web address]  How can I take care of myself?   Get lots of rest. Drink extra fluids (unless a doctor has told you not to).   Take Tylenol (acetaminophen) for fever or pain. If you have liver or kidney problems, ask your family doctor if it's okay to take Tylenol.   Adults can take either:    650 mg (two 325 mg pills) every 4 to 6 hours, or...   1,000 mg (two 500 mg pills) every 8 hours as needed.    Note: Don't take more than 3,000 mg in one day. Acetaminophen is found in many medicines (both prescribed and over-the-counter medicines). Read all labels to be sure you don't take too much.   For children, check the Tylenol bottle for the right dose. The dose is based on the child's age or weight.    If you have other health problems (like cancer, heart failure, an organ transplant or severe kidney disease): Call your specialty clinic if you don't feel better in the next 2 days.       Know when to call 911. Emergency warning signs include:    Trouble breathing or shortness of breath Pain or pressure in the chest that doesn't go away Feeling confused like you haven't felt before, or not being able to wake up Bluish-colored  lips or face.  Where can I get more information?   [facility] -- About COVID-19: [email or web address]   CDC -- What to Do If You're Sick: www.cdc.gov/coronavirus/2019-ncov/about/steps-when-sick.html   CDC -- Ending Home Isolation: www.cdc.gov/coronavirus/2019-ncov/hcp/disposition-in-home-patients.html   [facility] -- Caring for Someone: www.cdc.gov/coronavirus/2019-ncov/if-you-are-sick/care-for-someone.html   [facility] -- Interim Guidance for Hospital Discharge to Home: www.[facility].[place]/diseases/coronavirus/hcp/hospdischarge.pdf   [facility] clinical trials (COVID-19 research studies): clinicalaffairs.[facility]-clinical-trials    Below are the COVID-19 hotlines at the [place] Department of [facility] ([facility]). Interpreters are available.    For health questions: Call [phone] or 1-736.456.5599 (7 a.m. to 7 p.m.) For questions about schools and childcare: Call 457-251-7218 or 1-917.835.2073 (7 a.m. to 7 p.m.)    Diagnosis: Cough  Diagnosis ICD: R05

## 2020-09-10 ENCOUNTER — AMBULATORY - HEALTHEAST (OUTPATIENT)
Dept: FAMILY MEDICINE | Facility: CLINIC | Age: 45
End: 2020-09-10

## 2020-09-10 DIAGNOSIS — Z20.822 SUSPECTED 2019 NOVEL CORONAVIRUS INFECTION: ICD-10-CM

## 2020-09-12 ENCOUNTER — COMMUNICATION - HEALTHEAST (OUTPATIENT)
Dept: SCHEDULING | Facility: CLINIC | Age: 45
End: 2020-09-12

## 2020-10-26 ENCOUNTER — HOSPITAL ENCOUNTER (OUTPATIENT)
Dept: MAMMOGRAPHY | Facility: CLINIC | Age: 45
Discharge: HOME | End: 2020-10-26
Attending: NURSE PRACTITIONER | Admitting: NURSE PRACTITIONER
Payer: COMMERCIAL

## 2020-10-26 DIAGNOSIS — Z12.31 ENCOUNTER FOR SCREENING MAMMOGRAM FOR BREAST CANCER: ICD-10-CM

## 2020-10-26 PROCEDURE — 77067 SCR MAMMO BI INCL CAD: CPT

## 2020-11-03 ENCOUNTER — VIRTUAL VISIT (OUTPATIENT)
Dept: FAMILY MEDICINE | Facility: OTHER | Age: 45
End: 2020-11-03
Payer: COMMERCIAL

## 2020-11-03 PROCEDURE — 99421 OL DIG E/M SVC 5-10 MIN: CPT | Performed by: FAMILY MEDICINE

## 2020-11-03 NOTE — PROGRESS NOTES
"Date: 2020 13:56:54  Clinician: Harrison Bernal  Clinician NPI: [id number]  Patient: Olesya Cintron  Patient : 1975  Patient Address: [address] [place] [address] [place] [address]  Patient Phone: [phone]  Visit Protocol: URI  Patient Summary:  Olesya is a 45 year old ( : 1975 ) female who initiated a [facility] Visit for cold, sinus infection, or influenza. When asked the question \"Please sign me up to receive news, health information and promotions from [facility].\", Olesya responded \"Yes\".    Olesya states her symptoms started suddenly 3-4 days ago.   Her symptoms consist of facial pain or pressure, myalgia, malaise, a sore throat, tooth pain, ageusia, diarrhea, a headache, wheezing, enlarged lymph nodes, a cough, nasal congestion, and anosmia. She is experiencing difficulty breathing due to nasal congestion but she is not short of breath.   Symptom details     Nasal secretions: The color of her mucus is yellow.    Cough: Olesya coughs a few times an hour and her cough is not more bothersome at night. Phlegm comes into her throat when she coughs. She believes her cough is caused by post-nasal drip. The color of the phlegm is yellow.     Sore throat: Olesya reports having moderate throat pain (4-6 on a 10 point pain scale), has exudate on her tonsils, and can swallow liquids. The lymph nodes in her neck are enlarged. A rash has not appeared on the skin since the sore throat started.     Wheezing: Olesya has been diagnosed with asthma. Additional wheezing details as reported by the patient (free text): When I take a deep breath I have a small whistle       Facial pain or pressure: The facial pain or pressure feels worse when bending over or leaning forward.     Headache: She states the headache is severe (7-9 on a 10 point pain scale).     Tooth pain: The tooth pain is not caused by a cavity, recent dental work, or other mouth problems.      Olesya denies having vomiting, rhinitis, chills, ear pain, fever, and nausea. She " also denies taking antibiotic medication in the past month, having recent facial or sinus surgery in the past 60 days, and double sickening (worsening symptoms after initial improvement).   Precipitating events  Within the past week, Olesya has not been exposed to someone with strep throat. She has not recently been exposed to someone with influenza. Olesya has been in close contact with the following high risk individuals: adults 65 or older and people with asthma, heart disease or diabetes.   Pertinent COVID-19 (Coronavirus) information  Olesya does not work or volunteer as healthcare worker or a . In the past 14 days, Olesya has not worked or volunteered at a healthcare facility or group living setting.   In the past 14 days, she also has not lived in a congregate living setting.   Olesya has not had a close contact with a laboratory-confirmed COVID-19 patient within 14 days of symptom onset.    Since December 2019, Olesya has been tested for COVID-19 and has had upper respiratory infection (URI) or influenza-like illness.      Result of COVID-19 test: Negative     Date of her COVID-19 test: 09/08/2020     Date(s) of previous URI or influenza-like illness (free-text): 9/08/2020     Symptoms Olesya experienced during previous URI or influenza-like illness as reported by the patient (free-text): sinus pressure, fever, headache, sore throat        Pertinent medical history  Olesya had 2 sinus infections within the past year.   Olesya typically gets a yeast infection when she takes antibiotics. She has used fluconazole (Diflucan) to treat previous yeast infections. 2 doses of fluconazole (Diflucan) has typically been needed for symptoms to resolve in the past.  Olesya does not need a return to work/school note.   Weight: 145 lbs   Olesya does not smoke or use smokeless tobacco.   She denies pregnancy and denies breastfeeding. She has menstruated in the past month.   Weight: 145 lbs    MEDICATIONS:  dextroamphetamine-amphetamine oral, Fish Oil oral, cefuroxime axetil oral, Complete Multivitamin-Multimineral oral, Sprintec (28) oral, trazodone oral, ALLERGIES: Sulfa (Sulfonamide Antibiotics), Penicillins  Clinician Response:  Dear Olesya,   Your symptoms show that you may have coronavirus (COVID-19). This illness can cause fever, cough and trouble breathing. Many people get a mild case and get better on their own. Some people can get very sick.  What should I do?  We would like to test you for this virus.   1. Please call [phone] to schedule your visit. Explain that you were referred by [facility] to have a COVID-19 test. Be ready to share your [facility] visit ID number.  The following will serve as your written order for this COVID Test, ordered by me, for the indication of suspected COVID [Z20.828]: The test will be ordered in [employer], our electronic health record, after you are scheduled. It will show as ordered and authorized by Tony Guillen MD.  Order: COVID-19 (Coronavirus) PCR for SYMPTOMATIC testing from [facility].   2. When it's time for your COVID test:  Stay at least 6 feet away from others. (If someone will drive you to your test, stay in the backseat, as far away from the  as you can.)   Cover your mouth and nose with a mask, tissue or washcloth.  Go straight to the testing site. Don't make any stops on the way there or back.      3.Starting now: Stay home and away from others (self-isolate) until:   You've had no fever---and no medicine that reduces fever---for one full day (24 hours). And...   Your other symptoms have gotten better. For example, your cough or breathing has improved. And...   At least 10 days have passed since your symptoms started.       During this time, don't leave the house except for testing or medical care.   Stay in your own room, even for meals. Use your own bathroom if you can.   Stay away from others in your home. No hugging, kissing or shaking hands. No visitors.  Don't go to  "work, school or anywhere else.    Clean \"high touch\" surfaces often (doorknobs, counters, handles, etc.). Use a household cleaning spray or wipes. You'll find a full list of  on the EPA website: www.epa.gov/pesticide-registration/list-n-disinfectants-use-against-sars-cov-2.   Cover your mouth and nose with a mask, tissue or washcloth to avoid spreading germs.  Wash your hands and face often. Use soap and water.  Caregivers in these groups are at risk for severe illness due to COVID-19:  o People 65 years and older  o People who live in a nursing home or long-term care facility  o People with chronic disease (lung, heart, cancer, diabetes, kidney, liver, immunologic)  o People who have a weakened immune system, including those who:   Are in cancer treatment  Take medicine that weakens the immune system, such as corticosteroids  Had a bone marrow or organ transplant  Have an immune deficiency  Have poorly controlled HIV or AIDS  Are obese (body mass index of 40 or higher)  Smoke regularly   o Caregivers should wear gloves while washing dishes, handling laundry and cleaning bedrooms and bathrooms.  o Use caution when washing and drying laundry: Don't shake dirty laundry, and use the warmest water setting that you can.  o For more tips, go to www.cdc.gov/coronavirus/2019-ncov/downloads/10Things.pdf.    4.Sign up for [employer]. We know it's scary to hear that you might have COVID-19. We want to track your symptoms to make sure you're okay over the next 2 weeks. Please look for an email from [employer]---this is a free, online program that we'll use to keep in touch. To sign up, follow the link in the email. Learn more at [email or web address]  How can I take care of myself?   Get lots of rest. Drink extra fluids (unless a doctor has told you not to).   Take Tylenol (acetaminophen) for fever or pain. If you have liver or kidney problems, ask your family doctor if it's okay to take Tylenol.   " Adults can take either:    650 mg (two 325 mg pills) every 4 to 6 hours, or...   1,000 mg (two 500 mg pills) every 8 hours as needed.    Note: Don't take more than 3,000 mg in one day. Acetaminophen is found in many medicines (both prescribed and over-the-counter medicines). Read all labels to be sure you don't take too much.   For children, check the Tylenol bottle for the right dose. The dose is based on the child's age or weight.    If you have other health problems (like cancer, heart failure, an organ transplant or severe kidney disease): Call your specialty clinic if you don't feel better in the next 2 days.       Know when to call 911. Emergency warning signs include:    Trouble breathing or shortness of breath Pain or pressure in the chest that doesn't go away Feeling confused like you haven't felt before, or not being able to wake up Bluish-colored lips or face.  Where can I get more information?   [facility] -- About COVID-19: [email or web address]   CDC -- What to Do If You're Sick: www.cdc.gov/coronavirus/2019-ncov/about/steps-when-sick.html   CDC -- Ending Home Isolation: www.cdc.gov/coronavirus/2019-ncov/hcp/disposition-in-home-patients.html   [facility] -- Caring for Someone: www.cdc.gov/coronavirus/2019-ncov/if-you-are-sick/care-for-someone.html   [facility] -- Interim Guidance for Hospital Discharge to Home: www.health.[facility].[place]/diseases/coronavirus/hcp/hospdischarge.pdf   [facility] clinical trials (COVID-19 research studies): clinicalaffairs.[facility]-clinical-trials    Below are the COVID-19 hotlines at the [place] Department of Health ([facility]). Interpreters are available.    For health questions: Call 424-646-2354 or 1-377.592.2643 (7 a.m. to 7 p.m.) For questions about schools and childcare: Call 617-710-2107 or 1-640.424.5905 (7 a.m. to 7 p.m.)    Diagnosis: Contact with and (suspected) exposure to other viral communicable diseases  Diagnosis ICD: Z20.828

## 2020-11-06 DIAGNOSIS — Z20.822 SUSPECTED COVID-19 VIRUS INFECTION: Primary | ICD-10-CM

## 2020-11-06 PROCEDURE — U0003 INFECTIOUS AGENT DETECTION BY NUCLEIC ACID (DNA OR RNA); SEVERE ACUTE RESPIRATORY SYNDROME CORONAVIRUS 2 (SARS-COV-2) (CORONAVIRUS DISEASE [COVID-19]), AMPLIFIED PROBE TECHNIQUE, MAKING USE OF HIGH THROUGHPUT TECHNOLOGIES AS DESCRIBED BY CMS-2020-01-R: HCPCS | Performed by: FAMILY MEDICINE

## 2020-11-07 LAB
SARS-COV-2 RNA SPEC QL NAA+PROBE: ABNORMAL
SPECIMEN SOURCE: ABNORMAL

## 2020-11-14 ENCOUNTER — HEALTH MAINTENANCE LETTER (OUTPATIENT)
Age: 45
End: 2020-11-14

## 2021-01-28 ENCOUNTER — OFFICE VISIT (OUTPATIENT)
Dept: FAMILY MEDICINE | Facility: CLINIC | Age: 46
End: 2021-01-28
Payer: COMMERCIAL

## 2021-01-28 VITALS
TEMPERATURE: 97.7 F | BODY MASS INDEX: 23.46 KG/M2 | RESPIRATION RATE: 16 BRPM | HEART RATE: 76 BPM | WEIGHT: 149.8 LBS | OXYGEN SATURATION: 98 % | DIASTOLIC BLOOD PRESSURE: 84 MMHG | SYSTOLIC BLOOD PRESSURE: 136 MMHG

## 2021-01-28 DIAGNOSIS — N39.0 RECURRENT URINARY TRACT INFECTION: ICD-10-CM

## 2021-01-28 DIAGNOSIS — L65.9 HAIR LOSS: ICD-10-CM

## 2021-01-28 DIAGNOSIS — N89.8 VAGINAL DISCHARGE: ICD-10-CM

## 2021-01-28 DIAGNOSIS — R82.90 NONSPECIFIC FINDING ON EXAMINATION OF URINE: ICD-10-CM

## 2021-01-28 DIAGNOSIS — R30.0 DYSURIA: Primary | ICD-10-CM

## 2021-01-28 LAB
ALBUMIN UR-MCNC: NEGATIVE MG/DL
APPEARANCE UR: ABNORMAL
BACTERIA #/AREA URNS HPF: ABNORMAL /HPF
BILIRUB UR QL STRIP: NEGATIVE
COLOR UR AUTO: YELLOW
GLUCOSE UR STRIP-MCNC: NEGATIVE MG/DL
HGB UR QL STRIP: ABNORMAL
KETONES UR STRIP-MCNC: NEGATIVE MG/DL
LEUKOCYTE ESTERASE UR QL STRIP: ABNORMAL
NITRATE UR QL: POSITIVE
NON-SQ EPI CELLS #/AREA URNS LPF: ABNORMAL /LPF
PH UR STRIP: 6.5 PH (ref 5–7)
RBC #/AREA URNS AUTO: ABNORMAL /HPF
SOURCE: ABNORMAL
SP GR UR STRIP: 1.02 (ref 1–1.03)
SPECIMEN SOURCE: NORMAL
TSH SERPL DL<=0.005 MIU/L-ACNC: 1.31 MU/L (ref 0.4–4)
UROBILINOGEN UR STRIP-ACNC: 0.2 EU/DL (ref 0.2–1)
WBC #/AREA URNS AUTO: ABNORMAL /HPF
WBC CLUMPS #/AREA URNS HPF: PRESENT /HPF
WET PREP SPEC: NORMAL

## 2021-01-28 PROCEDURE — 99213 OFFICE O/P EST LOW 20 MIN: CPT | Performed by: NURSE PRACTITIONER

## 2021-01-28 PROCEDURE — 81001 URINALYSIS AUTO W/SCOPE: CPT | Performed by: NURSE PRACTITIONER

## 2021-01-28 PROCEDURE — 87086 URINE CULTURE/COLONY COUNT: CPT | Performed by: NURSE PRACTITIONER

## 2021-01-28 PROCEDURE — 36415 COLL VENOUS BLD VENIPUNCTURE: CPT | Performed by: NURSE PRACTITIONER

## 2021-01-28 PROCEDURE — 84443 ASSAY THYROID STIM HORMONE: CPT | Performed by: NURSE PRACTITIONER

## 2021-01-28 PROCEDURE — 87210 SMEAR WET MOUNT SALINE/INK: CPT | Performed by: NURSE PRACTITIONER

## 2021-01-28 RX ORDER — CEFDINIR 300 MG/1
300 CAPSULE ORAL 2 TIMES DAILY
Qty: 14 CAPSULE | Refills: 0 | Status: SHIPPED | OUTPATIENT
Start: 2021-01-28 | End: 2021-02-04

## 2021-01-28 RX ORDER — NITROFURANTOIN 25; 75 MG/1; MG/1
100 CAPSULE ORAL 2 TIMES DAILY
Qty: 10 CAPSULE | Refills: 0 | Status: CANCELLED | OUTPATIENT
Start: 2021-01-28 | End: 2021-02-02

## 2021-01-28 NOTE — PROGRESS NOTES
Assessment & Plan     Dysuria     - Wet prep  - *UA reflex to Microscopic and Culture ([place] [facility] (except [place] and [place])  - Urine Microscopic  - cefdinir (OMNICEF) 300 MG capsule; Take 1 capsule (300 mg) by mouth 2 times daily for 7 days    Vaginal discharge     - *UA reflex to Microscopic and Culture ([place] [facility] (except [place] and [place])    Recurrent urinary tract infection       Hair loss     - TSH with free T4 reflex    Nonspecific finding on examination of urine     - Urine Culture Aerobic Bacterial  - cefdinir (OMNICEF) 300 MG capsule; Take 1 capsule (300 mg) by mouth 2 times daily for 7 days                    Patient Instructions     Patient Education     Bladder Infection, Female (Adult)     Urine normally doesn't have any germs (bacteria) in it. But bacteria can get into the urinary tract from the skin around the rectum. Or they can travel in the blood from other parts of the body. Once they are in your urinary tract, they can cause infection in these areas:    The urethra (urethritis)    The bladder (cystitis)    The kidneys (pyelonephritis)  The most common place for an infection is in the bladder. This is called a bladder infection. This is one of the most common infections in women. Most bladder infections are easily treated. They are not serious unless the infection spreads to the kidney.  The terms bladder infection, UTI, and cystitis are often used to describe the same thing. But they are not always the same. Cystitis is an inflammation of the bladder. The most common cause of cystitis is an infection.  Symptoms  The infection causes inflammation in the urethra and bladder. This causes many of the symptoms. The most common symptoms of a bladder infection are:    Pain or burning when urinating    Having to urinate more often than normal    Urgent need to urinate    Only a small amount of urine comes out    Blood in urine    Belly (abdominal)  discomfort. This is often in the lower belly above the pubic bone.    Cloudy urine    Strong- or bad-smelling urine    Unable to urinate (urinary retention)    Unable to hold urine in (urinary incontinence)    Fever    Loss of appetite    Confusion (in older adults)  Causes  Bladder infections are not contagious. You can't get one from someone else, from a toilet seat, or from sharing a bath.  The most common cause of bladder infections is bacteria from the bowels. The bacteria get onto the skin around the opening of the urethra. From there, they can get into the urine. Then they travel up to the bladder, causing inflammation and infection. This often happens because of:    Wiping incorrectly after urinating. Always wipe from front to back.    Bowel incontinence    Pregnancy    Procedures such as having a catheter put in    Older age    Not emptying your bladder. This can give bacteria a chance to grow in your urine.    Fluid loss (dehydration)    Constipation    Having sex    Using a diaphragm for birth control   Treatment  Bladder infections are diagnosed by a urine test and urine culture. They are treated with antibiotics. They often clear up quickly without problems. Treatment helps prevent a more serious kidney infection.  Medicines  Medicines can help in the treatment of a bladder infection:    Take antibiotics until they are used up, even if you feel better. It's important to finish them to make sure the infection has cleared.    You can use acetaminophen or ibuprofen for pain, fever, or discomfort, unless another medicine was prescribed. If you have long-term (chronic) liver or kidney disease, talk with your healthcare provider before using these medicines. Also talk with your provider if you've ever had a stomach ulcer or GI (gastrointestinal) bleeding, or are taking blood-thinner medicines.    If you are given phenazopydridine to reduce burning with urination, it will make your urine a bright orange color.  This can stain clothing.  Care and prevention  These self-care steps can help prevent future infections:    Drink plenty of fluids. This helps to prevent dehydration and flush out your bladder. Do this unless you must restrict fluids for other health reasons, or your healthcare provider told you not to.    Clean yourself correctly after going to the bathroom. Wipe from front to back after using the toilet. This helps prevent the spread of bacteria.    Urinate more often. Don't try to hold urine in for a long time.    Wear loose-fitting clothes and cotton underwear. Don't wear tight-fitting pants.    Improve your diet and prevent constipation. Eat more fresh fruits and vegetables, and fiber. Eat less junk foods and fatty foods.    Don't have sex until your symptoms are gone.    Don't have caffeine, alcohol, and spicy foods. These can irritate your bladder.    Urinate right after you have sex to flush out your bladder.    If you use birth control pills and have frequent bladder infections, discuss it with your healthcare provider.  Follow-up care  Call your healthcare provider if all symptoms are not gone after 3 days of treatment. This is especially important if you have repeat infections.  If a culture was done, you will be told if your treatment needs to be changed. If directed, you can call to find out the results.  If X-rays were done, you will be told if the results will affect your treatment.  Call 911  Call 911 if any of the following occur:    Trouble breathing    Hard to wake up or confusion    Fainting (loss of consciousness)    Fast heart rate  When to get medical advice  Call your healthcare provider right away if any of these occur:    Fever of 100.4 F (38.0 C) or higher, or as directed by your healthcare provider    Symptoms are not better after 3 days of treatment    Back or belly pain that gets worse    Repeated vomiting, or unable to keep medicine down    Weakness or dizziness    Vaginal  discharge    Pain, redness, or swelling in the outer vaginal area (labia)  [employer] last reviewed this educational content on 11/1/2019 2000-2020 [employer] [address], [place] [address]. All rights reserved. This information is not intended as a substitute for professional medical care. Always follow your healthcare professional's instructions.               Return if symptoms worsen or fail to improve.    Bere Harris NP  [facility]osta is a 45 year old who presents to clinic today for the following health issues     HPI       Genitourinary - Female  Onset/Duration: several weeks  Description:   Painful urination (Dysuria): pressure           Frequency: no  Blood in urine (Hematuria): no  Delay in urine (Hesitency): YES  Intensity: moderate  Progression of Symptoms:  worsening  Accompanying Signs & Symptoms:  Fever/chills: no  Flank pain: YES  Nausea and vomiting: no  Vaginal symptoms: discharge and odor  Abdominal/Pelvic Pain: YES  History:   History of frequent UTI s: YES, HX of UTI's  History of kidney stones: YES  Sexually Active: YES  Possibility of pregnancy: No  Precipitating or alleviating factors: None  Therapies tried and outcome: OTC    History of urinary tract infections not responding to Macrobid or Bactrim.  Was on Macrobid post intercourse for years so ? Resistance.    Patient is having hair loss, Family History of Thyroid problems    Review of Systems   Constitutional, HEENT, cardiovascular, pulmonary, GI, , musculoskeletal, neuro, skin, endocrine and psych systems are negative, except as otherwise noted.      Objective    /84   Pulse 76   Temp 97.7  F (36.5  C) (Tympanic)   Resp 16   Wt 67.9 kg (149 lb 12.8 oz)   SpO2 98%   BMI 23.46 kg/m    Body mass index is 23.46 kg/m .  Physical Exam   GENERAL: healthy, alert and no distress  RESP: lungs clear to auscultation - no rales, rhonchi or wheezes  CV:  regular rate and rhythm, normal S1 S2, no S3 or S4, no murmur, click or rub, no peripheral edema and peripheral pulses strong  ABDOMEN: soft, nontender, no hepatosplenomegaly, no masses and bowel sounds normal  MS: no gross musculoskeletal defects noted, no edema    Results for orders placed or performed in visit on 01/28/21   *UA reflex to Microscopic and Culture ([place] [facility] (except [place] and [place])     Status: Abnormal    Specimen: Midstream Urine   Result Value Ref Range    Color Urine Yellow     Appearance Urine Cloudy     Glucose Urine Negative NEG^Negative mg/dL    Bilirubin Urine Negative NEG^Negative    Ketones Urine Negative NEG^Negative mg/dL    Specific Gravity Urine 1.020 1.003 - 1.035    Blood Urine Trace (A) NEG^Negative    pH Urine 6.5 5.0 - 7.0 pH    Protein Albumin Urine Negative NEG^Negative mg/dL    Urobilinogen Urine 0.2 0.2 - 1.0 EU/dL    Nitrite Urine Positive (A) NEG^Negative    Leukocyte Esterase Urine Small (A) NEG^Negative    Source Midstream Urine    Urine Microscopic     Status: Abnormal   Result Value Ref Range    WBC Urine 10-25 (A) OTO5^0 - 5 /HPF    RBC Urine O - 2 OTO2^O - 2 /HPF    WBC Clumps Present (A) NEG^Negative /HPF    Squamous Epithelial /LPF Urine Moderate (A) FEW^Few /LPF    Bacteria Urine Many (A) NEG^Negative /HPF   Wet prep     Status: None    Specimen: Vagina   Result Value Ref Range    Specimen Description Vagina     Wet Prep No Trichomonas seen     Wet Prep No clue cells seen     Wet Prep No yeast seen     Wet Prep WBC'S seen  Few

## 2021-01-28 NOTE — PATIENT INSTRUCTIONS

## 2021-01-29 LAB
BACTERIA SPEC CULT: NORMAL
Lab: NORMAL
SPECIMEN SOURCE: NORMAL

## 2021-02-03 ENCOUNTER — MYC MEDICAL ADVICE (OUTPATIENT)
Dept: FAMILY MEDICINE | Facility: CLINIC | Age: 46
End: 2021-02-03

## 2021-02-03 DIAGNOSIS — R39.9 LOWER URINARY TRACT SYMPTOMS (LUTS): Primary | ICD-10-CM

## 2021-04-13 ENCOUNTER — E-VISIT (OUTPATIENT)
Dept: FAMILY MEDICINE | Facility: CLINIC | Age: 46
End: 2021-04-13
Payer: COMMERCIAL

## 2021-04-13 DIAGNOSIS — N39.0 ACUTE UTI (URINARY TRACT INFECTION): Primary | ICD-10-CM

## 2021-04-13 PROCEDURE — 99421 OL DIG E/M SVC 5-10 MIN: CPT | Performed by: FAMILY MEDICINE

## 2021-04-13 RX ORDER — NITROFURANTOIN 25; 75 MG/1; MG/1
100 CAPSULE ORAL 2 TIMES DAILY
Qty: 10 CAPSULE | Refills: 0 | Status: SHIPPED | OUTPATIENT
Start: 2021-04-13 | End: 2021-04-18

## 2021-04-13 NOTE — PATIENT INSTRUCTIONS
Dear Olesya Cintron    After reviewing your responses, I've been able to diagnose you with a urinary tract infection, which is a common infection of the bladder with bacteria.  This is not a sexually transmitted infection, though urinating immediately after intercourse can help prevent infections.  Drinking lots of fluids is also helpful to clear your current infection and prevent the next one.      I have sent a prescription for antibiotics to your pharmacy to treat this infection.    It is important that you take all of your prescribed medication even if your symptoms are improving after a few doses.  Taking all of your medicine helps prevent the symptoms from returning.     If your symptoms worsen, you develop pain in your back or stomach, develop fevers over 100 degrees F, or are not improving in 3-5 days, please contact your primary care provider for an appointment or visit any of our convenient Walk-in or Urgent Care Centers to be seen, which can be found on our website here.    Thanks again for choosing us as your health care partner,    Saman Singh MD

## 2021-07-14 ENCOUNTER — E-VISIT (OUTPATIENT)
Dept: FAMILY MEDICINE | Facility: CLINIC | Age: 46
End: 2021-07-14
Payer: COMMERCIAL

## 2021-07-14 DIAGNOSIS — N39.0 ACUTE UTI (URINARY TRACT INFECTION): Primary | ICD-10-CM

## 2021-07-14 PROCEDURE — 99421 OL DIG E/M SVC 5-10 MIN: CPT | Performed by: NURSE PRACTITIONER

## 2021-07-14 RX ORDER — NITROFURANTOIN 25; 75 MG/1; MG/1
100 CAPSULE ORAL 2 TIMES DAILY
Qty: 10 CAPSULE | Refills: 0 | Status: SHIPPED | OUTPATIENT
Start: 2021-07-14 | End: 2021-07-19

## 2021-07-14 NOTE — PATIENT INSTRUCTIONS
Dear Olesya Cintron    After reviewing your responses, I've been able to diagnose you with a urinary tract infection, which is a common infection of the bladder with bacteria.  This is not a sexually transmitted infection, though urinating immediately after intercourse can help prevent infections.  Drinking lots of fluids is also helpful to clear your current infection and prevent the next one.      I have sent a prescription for antibiotics to your pharmacy to treat this infection.    It is important that you take all of your prescribed medication even if your symptoms are improving after a few doses.  Taking all of your medicine helps prevent the symptoms from returning.     If your symptoms worsen, you develop pain in your back or stomach, develop fevers, or are not improving in 5 days, please contact your primary care provider for an appointment or visit any of our convenient Walk-in or Urgent Care Centers to be seen, which can be found on our website here.    Thanks again for choosing us as your health care partner,    Bere Harris NP

## 2021-08-03 ENCOUNTER — E-VISIT (OUTPATIENT)
Dept: FAMILY MEDICINE | Facility: CLINIC | Age: 46
End: 2021-08-03
Payer: COMMERCIAL

## 2021-08-03 DIAGNOSIS — N89.8 VAGINAL DISCHARGE: ICD-10-CM

## 2021-08-03 DIAGNOSIS — N39.0 ACUTE UTI (URINARY TRACT INFECTION): ICD-10-CM

## 2021-08-03 PROCEDURE — 99421 OL DIG E/M SVC 5-10 MIN: CPT | Performed by: PHYSICIAN ASSISTANT

## 2021-08-03 RX ORDER — NITROFURANTOIN 25; 75 MG/1; MG/1
100 CAPSULE ORAL 2 TIMES DAILY
Qty: 10 CAPSULE | Refills: 0 | Status: SHIPPED | OUTPATIENT
Start: 2021-08-03 | End: 2021-08-08

## 2021-08-03 NOTE — PATIENT INSTRUCTIONS
Dear Olesya Cintron,    We are sorry you are not feeling well. Based on the responses you provided, it is recommended that you be seen in-person in urgent care so we can better evaluate your symptoms and obtain labs and evaluation. Please click here to find the nearest urgent care location to you.   You will not be charged for this Visit. Thank you for trusting us with your care.    Nikko Maldonado PA-C

## 2021-08-05 ENCOUNTER — OFFICE VISIT (OUTPATIENT)
Dept: OBGYN | Facility: CLINIC | Age: 46
End: 2021-08-05
Payer: COMMERCIAL

## 2021-08-05 VITALS
HEIGHT: 68 IN | RESPIRATION RATE: 10 BRPM | DIASTOLIC BLOOD PRESSURE: 81 MMHG | TEMPERATURE: 99.3 F | WEIGHT: 146.5 LBS | HEART RATE: 90 BPM | BODY MASS INDEX: 22.2 KG/M2 | SYSTOLIC BLOOD PRESSURE: 114 MMHG

## 2021-08-05 DIAGNOSIS — N93.9 ABNORMAL UTERINE BLEEDING: ICD-10-CM

## 2021-08-05 DIAGNOSIS — N30.10 INTERSTITIAL CYSTITIS: ICD-10-CM

## 2021-08-05 DIAGNOSIS — N90.89 LABIAL IRRITATION: ICD-10-CM

## 2021-08-05 DIAGNOSIS — R30.9 PAIN WITH URINATION: ICD-10-CM

## 2021-08-05 DIAGNOSIS — N71.9 ENDOMETRITIS: Primary | ICD-10-CM

## 2021-08-05 LAB
ALBUMIN UR-MCNC: NEGATIVE MG/DL
APPEARANCE UR: CLEAR
BACTERIA #/AREA URNS HPF: ABNORMAL /HPF
BILIRUB UR QL STRIP: NEGATIVE
CLUE CELLS: ABNORMAL
COLOR UR AUTO: YELLOW
ESTRADIOL SERPL-MCNC: 1106 PG/ML
FSH SERPL-ACNC: 1.1 IU/L
GLUCOSE UR STRIP-MCNC: NEGATIVE MG/DL
HGB UR QL STRIP: ABNORMAL
KETONES UR STRIP-MCNC: NEGATIVE MG/DL
LEUKOCYTE ESTERASE UR QL STRIP: NEGATIVE
LH SERPL-ACNC: 2.2 IU/L
NITRATE UR QL: NEGATIVE
PH UR STRIP: 5.5 [PH] (ref 5–7)
RBC #/AREA URNS AUTO: ABNORMAL /HPF
SP GR UR STRIP: 1.01 (ref 1–1.03)
SQUAMOUS #/AREA URNS AUTO: ABNORMAL /LPF
TRICHOMONAS, WET PREP: ABNORMAL
UROBILINOGEN UR STRIP-ACNC: 0.2 E.U./DL
WBC #/AREA URNS AUTO: ABNORMAL /HPF
WBC'S/HIGH POWER FIELD, WET PREP: ABNORMAL
YEAST, WET PREP: ABNORMAL

## 2021-08-05 PROCEDURE — 83001 ASSAY OF GONADOTROPIN (FSH): CPT | Performed by: OBSTETRICS & GYNECOLOGY

## 2021-08-05 PROCEDURE — 87210 SMEAR WET MOUNT SALINE/INK: CPT | Performed by: OBSTETRICS & GYNECOLOGY

## 2021-08-05 PROCEDURE — 82670 ASSAY OF TOTAL ESTRADIOL: CPT | Performed by: OBSTETRICS & GYNECOLOGY

## 2021-08-05 PROCEDURE — 81001 URINALYSIS AUTO W/SCOPE: CPT | Performed by: OBSTETRICS & GYNECOLOGY

## 2021-08-05 PROCEDURE — 83002 ASSAY OF GONADOTROPIN (LH): CPT | Performed by: OBSTETRICS & GYNECOLOGY

## 2021-08-05 PROCEDURE — 36415 COLL VENOUS BLD VENIPUNCTURE: CPT | Performed by: OBSTETRICS & GYNECOLOGY

## 2021-08-05 PROCEDURE — 99204 OFFICE O/P NEW MOD 45 MIN: CPT | Performed by: OBSTETRICS & GYNECOLOGY

## 2021-08-05 PROCEDURE — 87086 URINE CULTURE/COLONY COUNT: CPT | Performed by: OBSTETRICS & GYNECOLOGY

## 2021-08-05 RX ORDER — AMITRIPTYLINE HYDROCHLORIDE 10 MG/1
10 TABLET ORAL AT BEDTIME
Qty: 90 TABLET | Refills: 4 | Status: SHIPPED | OUTPATIENT
Start: 2021-08-05

## 2021-08-05 RX ORDER — ESTRADIOL 0.1 MG/G
2 CREAM VAGINAL
Qty: 42.5 G | Refills: 1 | Status: SHIPPED | OUTPATIENT
Start: 2021-08-05

## 2021-08-05 RX ORDER — PHENAZOPYRIDINE HYDROCHLORIDE 100 MG/1
100 TABLET, FILM COATED ORAL 3 TIMES DAILY PRN
Qty: 9 TABLET | Refills: 12 | Status: SHIPPED | OUTPATIENT
Start: 2021-08-05 | End: 2021-08-08

## 2021-08-05 ASSESSMENT — MIFFLIN-ST. JEOR: SCORE: 1348.02

## 2021-08-05 NOTE — NURSING NOTE
"Initial /81 (BP Location: Right arm, Patient Position: Sitting, Cuff Size: Adult Regular)   Pulse 90   Temp 99.3  F (37.4  C) (Tympanic)   Resp 10   Ht 1.727 m (5' 8\")   Wt 66.5 kg (146 lb 8 oz)   LMP 07/28/2021   BMI 22.28 kg/m   Estimated body mass index is 22.28 kg/m  as calculated from the following:    Height as of this encounter: 1.727 m (5' 8\").    Weight as of this encounter: 66.5 kg (146 lb 8 oz). .      "

## 2021-08-05 NOTE — PROGRESS NOTES
"[facility]  OB/GYN Clinic   Gynecology Consult Note    CC  Chief Complaint   Patient presents with     Consult       HPI: Ms. Cintron is a 46 year old  being seen for GYN consultation for multiple GYN issues including   1) Bladder symptoms: Has felt like she has had a bladder infection for the past 11 years. She has chronic bladder burning sensation. Has seen a Urologist, who said everything was fine. Did physical therapy after birth. Has \"nerve damage\" due to pain after delivery. Used an antibiotic after intercourse and this worked well for many years. Mergiles had recommend a vaginal PH wash. Urinates after intercourse and takes a bath. UTIs are getting more frequent and has back aches for it. Currently on macrobid, urination isn't as frequent, but still has burning, especially at the tail end of urination. Reports frequent hematuria.   2) Labial irritation and tearing: There is a spot on her left labia that gets small little tears after intercourse. Her skin is very fragile tears very easily. Takes many days to recover ad is painful.   Denies vaginal discharge. Has a lot of pain after sex from the tearing, no dyspareunia. Does not used a lubricant typically.   3) Abnormal uterine bleeding: Periods are becoming strange. Using OCPs. Got period a week early. Not missing pills. Has had night sweats. Drenched, only happened a few times within the last year.     GYN Hx: Reports menarche at age 13, OCPs for contraception. Denies any hx of ovarian cysts, uterine fibroids or polyps. Denies any hx of STI or PID. Had cervical cancer at age 20 and had a LEEP. Has has ovarian cysts and then hx of rupture.   Had 3x episiotomies.     ROS: A 10 pt ROS was completed and found to be otherwise negative unless mentioned in the HPI.     PMH:   Past Medical History:   Diagnosis Date     Depression     age 12     Eating disorder     anorexia age 12     Helicobacter pylori (H. pylori)     txed with abx     " History of frequent urinary tract infections      MEDICAL HISTORY OF -     fx tailbone age 12     Mononucleosis     age 11     Personal history of other malignant neoplasm of skin     precancer left ear, left foot      labor ,     S/P LEEP of cervix     path results??       PSHx:   Past Surgical History:   Procedure Laterality Date     HC REMOVE TONSILS/ADENOIDS,11+ Y/O       LEEP TX, CERVICAL       SURGICAL HISTORY OF -   age 6    Hydrodistention of bladder     SURGICAL HISTORY OF -       Local excision of skin precancer- foot and ear     UPPER GI ENDOSCOPY      H-pylori     ZZHC COLONOSCOPY THRU STOMA, DIAGNOSTIC      normal       OBHx:   OB History    Para Term  AB Living   4 3 2 1 1 3   SAB TAB Ectopic Multiple Live Births   1 0 0 0 3      # Outcome Date GA Lbr Gerardo/2nd Weight Sex Delivery Anes PTL Lv   4 SAB 13              Birth Comments: System Generated. Please review and update pregnancy details.   3  02/21/10 36w0d 02:18 3.118 kg (6 lb 14 oz) M    DEVON      Name: Silvio      Apgar1: 9  Apgar5: 9   2 Term 95 37w0d 04:00 2.722 kg (6 lb) F    DEVON      Birth Comments: bedrest  -elevated bp in labor   1 Term 91 37w0d 04:00 2.637 kg (5 lb 13 oz) M    DEVON      Birth Comments: bedrest  7 months      Obstetric Comments    x 2, PTL with bedrest       Medications:   amphetamine-dextroamphetamine (ADDERALL XR) 20 MG 24 hr capsule, Take 20 mg by mouth daily  co-enzyme Q-10 100 MG CAPS capsule, Take by mouth daily  cyclobenzaprine (FLEXERIL) 10 MG tablet, Take 0.5-1 tablets (5-10 mg) by mouth 2 times daily as needed for muscle spasms  Fish Oil-Cholecalciferol (FISH OIL + D3 PO),   fluticasone (FLONASE) 50 MCG/ACT spray, USE ONE TO TWO SPRAYS IN BOTH NOSTRILS ONCE DAILY AS NEEDED FOR RHINITIS OR ALLERGIES  nitroFURantoin macrocrystal-monohydrate (MACROBID) 100 MG capsule, Take 1 capsule (100 mg) by mouth 2 times daily for 5  days  norgestimate-ethinyl estradiol (ORTHO-CYCLEN) 0.25-35 MG-MCG tablet, TAKE ONE TABLET BY MOUTH ONCE DAILY  Omega-3 Fatty Acids (FISH OIL) 1200 MG capsule, Take 1,200 mg by mouth daily  omeprazole (PRILOSEC) 20 MG CR capsule, Take 1 capsule (20 mg) by mouth daily  traZODone (DESYREL) 25 MG TABS, Take 50 mg by mouth At Bedtime 1 to 1 1/2 tablets at bedtime  VITAMIN D, CHOLECALCIFEROL, PO, Take by mouth daily  order for DME, Equipment being ordered: Valved chamber spacer, use with inhaler. (Patient not taking: Reported on 6/25/2019)    No current facility-administered medications on file prior to visit.      Allergies:      Allergies   Allergen Reactions     Ciprofloxacin Nausea and Vomiting     Penicillin [Penicillins]      Hives         Social History:   Social History     Socioeconomic History     Marital status:      Spouse name: Not on file     Number of children: 2     Years of education: Not on file     Highest education level: Not on file   Occupational History     Occupation: Teacher     Employer: [employer]   Tobacco Use     Smoking status: Former Smoker     Packs/day: 0.00     Smokeless tobacco: Never Used     Tobacco comment: uses nicotene gum on occassion   Substance and Sexual Activity     Alcohol use: Yes     Comment: one drink per week     Drug use: No     Sexual activity: Yes     Partners: Male     Birth control/protection: Pill   Other Topics Concern     Parent/sibling w/ CABG, MI or angioplasty before 65F 55M? Yes     Comment: father- age 56   Social History Narrative     Not on file     Social Determinants of Health     Financial Resource Strain:      Difficulty of Paying Living Expenses:    Food Insecurity:      Worried About Running Out of Food in the Last Year:      Ran Out of Food in the Last Year:    Transportation Needs:      Lack of Transportation (Medical):      Lack of Transportation (Non-Medical):    Physical Activity:      Days of Exercise per Week:      Minutes  of Exercise per Session:    Stress:      Feeling of Stress :    Social Connections:      Frequency of Communication with Friends and Family:      Frequency of Social Gatherings with Friends and Family:      Attends Jainism Services:      Active Member of Clubs or Organizations:      Attends Club or Organization Meetings:      Marital Status:    Intimate Partner Violence:      Fear of Current or Ex-Partner:      Emotionally Abused:      Physically Abused:      Sexually Abused:      Social History     Socioeconomic History     Marital status:      Spouse name: None     Number of children: 2     Years of education: None     Highest education level: None   Occupational History     Occupation: Teacher     Employer: [employer]   Tobacco Use     Smoking status: Former Smoker     Packs/day: 0.00     Smokeless tobacco: Never Used     Tobacco comment: uses nicotene gum on occassion   Substance and Sexual Activity     Alcohol use: Yes     Comment: one drink per week     Drug use: No     Sexual activity: Yes     Partners: Male     Birth control/protection: Pill   Other Topics Concern     Parent/sibling w/ CABG, MI or angioplasty before 65F 55M? Yes     Comment: father- age 56   Social History Narrative     None     Social Determinants of Health     Financial Resource Strain:      Difficulty of Paying Living Expenses:    Food Insecurity:      Worried About Running Out of Food in the Last Year:      Ran Out of Food in the Last Year:    Transportation Needs:      Lack of Transportation (Medical):      Lack of Transportation (Non-Medical):    Physical Activity:      Days of Exercise per Week:      Minutes of Exercise per Session:    Stress:      Feeling of Stress :    Social Connections:      Frequency of Communication with Friends and Family:      Frequency of Social Gatherings with Friends and Family:      Attends Jainism Services:      Active Member of Clubs or Organizations:      Attends Club or  "Organization Meetings:      Marital Status:    Intimate Partner Violence:      Fear of Current or Ex-Partner:      Emotionally Abused:      Physically Abused:      Sexually Abused:        Teaches second grade.     Family History:   Family History   Problem Relation Age of Onset     Thyroid Disease Mother      Hypertension Mother      Lipids Mother      Lipids Father      Hypertension Father      Heart Disease Father         bypass surgery     Gastrointestinal Disease Father         ulcer     Alzheimer Disease Father      Prostate Cancer Paternal Grandfather      Alzheimer Disease Paternal Grandmother      Cardiovascular Maternal Grandfather         MI     Diabetes Maternal Grandfather      Breast Cancer No family hx of      Colon Cancer No family hx of    Mom was in late 40s when she went into menopause.   Mom has endometriosis and large ovarian cysts.       Physical Exam:   Vitals:    08/05/21 1449   BP: 114/81   BP Location: Right arm   Patient Position: Sitting   Cuff Size: Adult Regular   Pulse: 90   Resp: 10   Temp: 99.3  F (37.4  C)   TempSrc: Tympanic   Weight: 66.5 kg (146 lb 8 oz)   Height: 1.727 m (5' 8\")      Estimated body mass index is 22.28 kg/m  as calculated from the following:    Height as of this encounter: 1.727 m (5' 8\").    Weight as of this encounter: 66.5 kg (146 lb 8 oz).    Gen: Pleasant, talkative female in no apparent distress   Respiratory: breathing comfortably on room air   Cardiac: Regular rate, warm and well-perfused.   GI: Abd soft and non-tender  : External genitalia is free of lesion. No mass or tears appreciated of the labia. Urethra and bartholin glands normal.  Vaginal mucosa is moist and pink, but cervical discharge does appear thickened and white. Cervix is without lesions or discharge. Bimanual exam reveals mobile anteverted uterus without cervical motion tenderness.  Adenexa are mobile and non-tender bilaterally. No appreciable adnexal enlargement. Mild tenderness over the " bladder.   Rectal: no masses or hemorrhoids visually appreciated  Derm: no acanthosis nigricans, ance or facial/back/abdominal hair growth patterns   MSK: Grossly normal movement of all four extremities  Psych: mood and affect bright   Lower extremity: edema not present     A&P: Ms. Cintron is a 46 year old  being seen for GYN consultation for multiple GYN issues including   1) Bladder symptoms: I did review her history of UA/UCx, most of which are negative, including the most recent one for which she is currently on antibiotics. Will plan to repeat UA/UCx today, but I did my concern for interstitial cystitis or painful bladder syndrome. Discussed pathophysiology and lifestyle modifications of avoiding bladder irritants. Plan for prn pyridium and start amitriptyline. Discussed elmiron, lidocaine infusion, hydro distension, but will start with up titrating amitriptyline.   2) Labial irritation and tearing: wet prep collected for thick discharge. Recommended KY water based lubricant and an orgasm prior to penetration. Discussed vulvar cares in detail. Plan for vaginal estrogen since tissue thinning is so localized.   3) Abnormal uterine bleeding: plan lab work for endocrinopathy and pelvic US for structural cause of bleeding, could consider endometrial biopsy in the future     I spent 30 min with the patient and 15 min in documentation and chart review.     Narcisa Carey MD  OB/GYN  2021

## 2021-08-06 LAB — BACTERIA UR CULT: NO GROWTH

## 2021-08-06 RX ORDER — DOXYCYCLINE HYCLATE 100 MG
100 TABLET ORAL 2 TIMES DAILY
Qty: 20 TABLET | Refills: 0 | Status: SHIPPED | OUTPATIENT
Start: 2021-08-06 | End: 2021-08-16

## 2021-08-09 ENCOUNTER — TELEPHONE (OUTPATIENT)
Dept: OBGYN | Facility: CLINIC | Age: 46
End: 2021-08-09

## 2021-08-09 NOTE — TELEPHONE ENCOUNTER
Received Prior Auth request from [facility].  Filled out and faxed back.  Received response stating Estradial Cream 0.1mg has been approved for one year (8-5-22.)    Pharmacy informed - they will inform pt.    -Betzy LEVY [facility]  [facility] [place]

## 2021-08-10 ENCOUNTER — HOSPITAL ENCOUNTER (OUTPATIENT)
Dept: ULTRASOUND IMAGING | Facility: CLINIC | Age: 46
Discharge: HOME | End: 2021-08-10
Attending: OBSTETRICS & GYNECOLOGY | Admitting: OBSTETRICS & GYNECOLOGY
Payer: COMMERCIAL

## 2021-08-10 DIAGNOSIS — N93.9 ABNORMAL UTERINE BLEEDING: ICD-10-CM

## 2021-08-10 PROCEDURE — 76830 TRANSVAGINAL US NON-OB: CPT

## 2021-08-11 DIAGNOSIS — N83.202 LEFT OVARIAN CYST: Primary | ICD-10-CM

## 2021-09-12 ENCOUNTER — HEALTH MAINTENANCE LETTER (OUTPATIENT)
Age: 46
End: 2021-09-12

## 2022-01-02 ENCOUNTER — HEALTH MAINTENANCE LETTER (OUTPATIENT)
Age: 47
End: 2022-01-02

## 2022-01-26 ENCOUNTER — HOSPITAL ENCOUNTER (OUTPATIENT)
Dept: ULTRASOUND IMAGING | Facility: CLINIC | Age: 47
Discharge: HOME | End: 2022-01-26
Attending: OBSTETRICS & GYNECOLOGY | Admitting: OBSTETRICS & GYNECOLOGY
Payer: COMMERCIAL

## 2022-01-26 DIAGNOSIS — N83.202 LEFT OVARIAN CYST: ICD-10-CM

## 2022-01-26 PROCEDURE — 76830 TRANSVAGINAL US NON-OB: CPT

## 2022-01-31 ENCOUNTER — MEDICAL CORRESPONDENCE (OUTPATIENT)
Dept: HEALTH INFORMATION MANAGEMENT | Facility: CLINIC | Age: 47
End: 2022-01-31
Payer: COMMERCIAL

## 2022-01-31 ENCOUNTER — TRANSFERRED RECORDS (OUTPATIENT)
Dept: HEALTH INFORMATION MANAGEMENT | Facility: CLINIC | Age: 47
End: 2022-01-31
Payer: COMMERCIAL

## 2022-06-24 ENCOUNTER — OFFICE VISIT (OUTPATIENT)
Dept: FAMILY MEDICINE | Facility: CLINIC | Age: 47
End: 2022-06-24
Payer: COMMERCIAL

## 2022-06-24 ENCOUNTER — LAB (OUTPATIENT)
Dept: LAB | Facility: CLINIC | Age: 47
End: 2022-06-24

## 2022-06-24 VITALS
DIASTOLIC BLOOD PRESSURE: 78 MMHG | TEMPERATURE: 97.7 F | RESPIRATION RATE: 20 BRPM | OXYGEN SATURATION: 99 % | SYSTOLIC BLOOD PRESSURE: 109 MMHG | WEIGHT: 146 LBS | BODY MASS INDEX: 22.13 KG/M2 | HEIGHT: 68 IN | HEART RATE: 75 BPM

## 2022-06-24 DIAGNOSIS — K21.9 GASTROESOPHAGEAL REFLUX DISEASE WITHOUT ESOPHAGITIS: ICD-10-CM

## 2022-06-24 DIAGNOSIS — Z30.41 ENCOUNTER FOR SURVEILLANCE OF CONTRACEPTIVE PILLS: ICD-10-CM

## 2022-06-24 DIAGNOSIS — R10.11 RIGHT UPPER QUADRANT PAIN: Primary | ICD-10-CM

## 2022-06-24 DIAGNOSIS — F90.2 ADHD (ATTENTION DEFICIT HYPERACTIVITY DISORDER), COMBINED TYPE: Primary | ICD-10-CM

## 2022-06-24 DIAGNOSIS — M54.2 NECK PAIN: ICD-10-CM

## 2022-06-24 LAB — ETHANOL UR QL SCN: NORMAL

## 2022-06-24 PROCEDURE — 99214 OFFICE O/P EST MOD 30 MIN: CPT | Performed by: NURSE PRACTITIONER

## 2022-06-24 PROCEDURE — 80306 DRUG TEST PRSMV INSTRMNT: CPT

## 2022-06-24 PROCEDURE — 80320 DRUG SCREEN QUANTALCOHOLS: CPT

## 2022-06-24 RX ORDER — CYCLOBENZAPRINE HCL 10 MG
5-10 TABLET ORAL 2 TIMES DAILY PRN
Qty: 30 TABLET | Refills: 1 | Status: CANCELLED | OUTPATIENT
Start: 2022-06-24

## 2022-06-24 RX ORDER — CYCLOBENZAPRINE HCL 10 MG
5-10 TABLET ORAL 2 TIMES DAILY PRN
Qty: 30 TABLET | Refills: 1 | Status: SHIPPED | OUTPATIENT
Start: 2022-06-24

## 2022-06-24 RX ORDER — NORGESTIMATE AND ETHINYL ESTRADIOL 0.25-0.035
1 KIT ORAL DAILY
Qty: 84 TABLET | Refills: 3 | Status: SHIPPED | OUTPATIENT
Start: 2022-06-24 | End: 2023-05-31

## 2022-06-24 ASSESSMENT — PAIN SCALES - GENERAL: PAINLEVEL: NO PAIN (0)

## 2022-06-24 NOTE — PROGRESS NOTES
Assessment & Plan     Right upper quadrant pain  Since patient has had symptoms again of gallbladder disease, discussed lowering fats in diet.  This could be many causes including abdominal bloating, gastritis, reflux, and constipation cause.  We will repeat ultrasound of the right upper quadrant to see if there is any stone since patient would like to undergo liver and gallbladder cleansing with her chiropractor to ensure safe treatment with that.  I will contact her with the results of the ultrasound.  If negative may repeat HIDA scan as well to make sure that gallbladder function is still within normal limits.  - US Abdomen Limited; Future    Gastroesophageal reflux disease without esophagitis  Patient was on the medication and then took her self off since she was feeling better and now has recurrent symptoms.  We will restart Prilosec 20 mg once a day with 1 year refill.  Reviewed long-term side effects and discussed checking magnesium levels every few years.  - omeprazole (PRILOSEC) 20 MG DR capsule; Take 1 capsule (20 mg) by mouth daily    Neck pain  Patient continues to work with chiropractic which has been beneficial.  She is requesting some Flexeril to help with sleep at night as needed.  Refill given for 30 tablets and 1 refill.  - cyclobenzaprine (FLEXERIL) 10 MG tablet; Take 0.5-1 tablets (5-10 mg) by mouth 2 times daily as needed for muscle spasms    Encounter for surveillance of contraceptive pills  Stable, refill given for 1 year.  - norgestimate-ethinyl estradiol (ORTHO-CYCLEN) 0.25-35 MG-MCG tablet; Take 1 tablet by mouth daily    See Patient Instructions    Return if symptoms worsen or fail to improve.    Julia Villa NP  [facility]    Elgin Dacosta is a 47 year old, presenting for the following health issues:  Abdominal Pain      History of Present Illness       Reason for visit:  Abdominal pain, poss gallbladder issue  Symptom onset:  3-4 weeks  "ago  Symptoms include:  Patient sates years ago, she was having similar issues and had some imaging done that showed she had a  lot of \"sludge\". The symptoms improved.  Patient is feeling better today.  She feels her symptoms returned due to stress of end of year at school.  She has tried adjusting her diet.  When her symptoms were bad, she was having a lot of back pain, inability to burp and fart.  Patient believes diet change and meeting regularly with a chiropractor have helped her symptoms    She eats 2-3 servings of fruits and vegetables daily.She consumes 1 sweetened beverage(s) daily.She exercises with enough effort to increase her heart rate 10 to 19 minutes per day.  She exercises with enough effort to increase her heart rate 3 or less days per week.   She is taking medications regularly.    Patient has been having ongoing stomach issues since she was 12 years old.  She admits to some anorexia when she was younger.  She gets abdominal bloating she hears noise in her stomach and had intermittent diarrhea over the last few weeks.  She was also having nausea with this.  Today the symptoms have seemed to resolve with some residual tenderness in her epigastric and right upper quadrant.  Patient states that the pain started in the epigastric area with a bulge and pressure.  She was taking Tums and Rolaids which did not help.  He she has changed her diet to rice and bread and that helped for a while.  She does state that she ate bad the last couple weeks of school as she is a  which may have also increased her symptoms.  Patient is also admitting to eating beet juice so her stool is red but because of the beats and not for blood.    Review of Systems   CONSTITUTIONAL: NEGATIVE for fever, chills, change in weight  RESP: NEGATIVE for significant cough or SOB  CV: NEGATIVE for chest pain, palpitations or peripheral edema  GI: POSITIVE for abdominal pain epigastric and RUQ, gas or bloating, heartburn or " "reflux and Hx IBS  PSYCHIATRIC: NEGATIVE for changes in mood or affect  ROS otherwise negative      Objective    /78 (BP Location: Right arm, Patient Position: Sitting, Cuff Size: Adult Regular)   Pulse 75   Temp 97.7  F (36.5  C) (Tympanic)   Resp 20   Ht 1.727 m (5' 8\")   Wt 66.2 kg (146 lb)   LMP  (LMP Unknown)   SpO2 99%   Breastfeeding No   BMI 22.20 kg/m    Body mass index is 22.2 kg/m .  Physical Exam   GENERAL: healthy, alert and no distress  ABDOMEN: soft, tenderness epigastric, RUQ and LLQ, no organomegaly or masses and bowel sounds normal  MS: no gross musculoskeletal defects noted, no edema  PSYCH: mentation appears normal, affect normal/bright    "

## 2022-06-24 NOTE — PATIENT INSTRUCTIONS
Refills given on omeprazole, birth control, and flexeril.  Make appointment for abdominal ultrasound for evaluation of gallbladder.  Drug testing today, this result should be sent to your provider that ordered this.

## 2022-06-27 LAB
AMPHETAMINES UR QL: DETECTED
BARBITURATES UR QL SCN: NOT DETECTED
BENZODIAZ UR QL SCN: NOT DETECTED
BUPRENORPHINE UR QL: NOT DETECTED
CANNABINOIDS UR QL: NOT DETECTED
COCAINE UR QL SCN: NOT DETECTED
D-METHAMPHET UR QL: NOT DETECTED
METHADONE UR QL SCN: NOT DETECTED
OPIATES UR QL SCN: NOT DETECTED
OXYCODONE UR QL SCN: NOT DETECTED
PCP UR QL SCN: NOT DETECTED
PROPOXYPH UR QL: NOT DETECTED
TRICYCLICS UR QL SCN: NOT DETECTED

## 2022-06-29 ENCOUNTER — ANCILLARY PROCEDURE (OUTPATIENT)
Dept: ULTRASOUND IMAGING | Facility: CLINIC | Age: 47
End: 2022-06-29
Attending: NURSE PRACTITIONER
Payer: COMMERCIAL

## 2022-06-29 DIAGNOSIS — R10.11 RIGHT UPPER QUADRANT PAIN: ICD-10-CM

## 2022-06-29 PROCEDURE — 76705 ECHO EXAM OF ABDOMEN: CPT | Mod: TC | Performed by: RADIOLOGY

## 2022-07-06 NOTE — MR AVS SNAPSHOT
Problem: Adult Inpatient Plan of Care  Goal: Plan of Care Review  Outcome: Ongoing, Progressing     Problem: Infection  Goal: Absence of Infection Signs and Symptoms  Outcome: Ongoing, Progressing     Problem: Fall Injury Risk  Goal: Absence of Fall and Fall-Related Injury  Outcome: Ongoing, Progressing   Plan of care reviewed with patient. No distress noted at this time. Lt leg dressing intact. Pt denied pain throughout shift. Fall precautions maintained. VSS. Will continue to monitor closely.    "              After Visit Summary   7/16/2018    Olesya Cintron    MRN: [id number]           Patient Information     Date Of Birth          1975        Visit Information        Provider Department      7/16/2018 3:40 PM Roxanne Sawant MD [facility]        Today's Diagnoses     Cervicalgia    -  1      Care Instructions        Plan:  - Today's Plan of Care:  Rehab: Physical Therapy: [facility] - [phone]    Follow Up: 4-6 weeks    If you have any further questions for your physician or physician s care team you can call [phone] and use option 3 to leave a voice message. Calls received during business hours will be returned same day.              Follow-ups after your visit        Additional Services     PHYSICAL THERAPY REFERRAL       *This therapy referral will be filtered to a centralized scheduling office at [facility] and the patient will receive a call to schedule an appointment at a [facility] location most convenient for them. *     [facility] provides Physical Therapy evaluation and treatment and many specialty services across the [facility] system.  If requesting a specialty program, please choose from the list below.    If you have not heard from the scheduling office within 2 business days, please call [phone] for all locations, with the exception of [place], please call [phone] and [facility], please call [phone]  Treatment: Evaluation & Treatment  Special Instructions/Modalities: evaluate and treat  Special Programs: None    Please be aware that coverage of these services is subject to the terms and limitations of your health insurance plan.  Call member services at your health plan with any benefit or coverage questions.      **Note to Provider:  If you are referring outside of [facility] for the therapy appointment, please list the name of the location in the \"special instructions\" above, print " "the referral and give to the patient to schedule the appointment.                  Who to contact     If you have questions or need follow up information about today's clinic visit or your schedule please contact [facility] directly at [phone].  Normal or non-critical lab and imaging results will be communicated to you by MyChart, letter or phone within 4 business days after the clinic has received the results. If you do not hear from us within 7 days, please contact the clinic through [facility] or phone. If you have a critical or abnormal lab result, we will notify you by phone as soon as possible.  Submit refill requests through [employer] [facility] or call your pharmacy and they will forward the refill request to us. Please allow 3 business days for your refill to be completed.          Additional Information About Your Visit        [employer] Information     [employer] gives you secure access to your electronic health record. If you see a primary care provider, you can also send messages to your care team and make appointments. If you have questions, please call your primary care clinic.  If you do not have a primary care provider, please call [phone] and they will assist you.        Care EveryWhere ID     This is your Care EveryWhere ID. This could be used by other organizations to access your [place] medical records  [id number]        Your Vitals Were     Height BMI (Body Mass Index)                5' 6.75\" (1.695 m) 23.04 kg/m2           Blood Pressure from Last 3 Encounters:   07/16/18 134/89   05/10/18 128/83   04/06/18 117/78    Weight from Last 3 Encounters:   07/16/18 146 lb (66.2 kg)   05/10/18 146 lb 6.4 oz (66.4 kg)   04/06/18 147 lb 3.2 oz (66.8 kg)              We Performed the Following     PHYSICAL THERAPY REFERRAL        Primary Care Provider Office Phone # Fax #    Bere Harris [phone] [address] [facility] [address]        Equal " Access to Services     [facility]: Hadii jaja bauer julio césar [place], waprasannada luqadaha, qavannessata kakenyalyndsay aquinoevelynmakenzie umanajoselyntripp hussein. [facility] [phone].    ATENCIÓN: Si kayla reyes, tiene a doyle disposición servicios gratuitos de asistencia lingüística. Llame al [phone].    We comply with applicable federal civil rights laws and [place] laws. We do not discriminate on the basis of race, color, national origin, age, disability, sex, sexual orientation, or gender identity.            Thank you!     Thank you for choosing [facility]  for your care. Our goal is always to provide you with excellent care. Hearing back from our patients is one way we can continue to improve our services. Please take a few minutes to complete the written survey that you may receive in the mail after your visit with us. Thank you!             Your Updated Medication List - Protect others around you: Learn how to safely use, store and throw away your medicines at www.disposemymeds.org.          This list is accurate as of 7/16/18  4:59 PM.  Always use your most recent med list.                   Brand Name Dispense Instructions for use Diagnosis    * albuterol (2.5 MG/3ML) 0.083% neb solution     30 vial    Take 1 vial (2.5 mg) by nebulization every 6 hours as needed for shortness of breath / dyspnea or wheezing    Acute bronchitis, unspecified organism       * albuterol 108 (90 Base) MCG/ACT Inhaler    PROAIR HFA/PROVENTIL HFA/VENTOLIN HFA    1 Inhaler    Inhale 2 puffs into the lungs every 6 hours as needed for shortness of breath / dyspnea or wheezing    Acute bronchitis, unspecified organism       fluticasone 50 MCG/ACT spray    FLONASE    16 g    USE ONE TO TWO SPRAYS IN BOTH NOSTRILS ONCE DAILY AS NEEDED FOR RHINITIS OR ALLERGIES    Acute maxillary sinusitis, recurrence not specified       ibuprofen 200 MG tablet    ADVIL/MOTRIN     Take 200 mg by mouth every 4 hours as  needed for mild pain        Multi-vitamin Tabs tablet      Take 1 tablet by mouth daily        norgestimate-ethinyl estradiol 0.25-35 MG-MCG per tablet    MONO-LINYAH    84 tablet    Take 1 tablet by mouth daily    Encounter for surveillance of contraceptive pills       omeprazole 20 MG CR capsule    priLOSEC    90 capsule    Take 1 capsule (20 mg) by mouth daily    Gastroesophageal reflux disease without esophagitis       order for DME     1 Units    Nebulizer    Acute bronchitis, unspecified organism       order for DME     1 Device    Equipment being ordered: Valved chamber spacer, use with inhaler.    Acute bronchitis, unspecified organism       sulfamethoxazole-trimethoprim 800-160 MG per tablet    BACTRIM DS/SEPTRA DS    20 tablet    Take 1 tablet by mouth daily as needed After intercourse    Recurrent urinary tract infection       traZODone 25 mg Tabs half-tab    DESYREL     Take 50 mg by mouth At Bedtime 1 to 1 1/2 tablets at bedtime        UNABLE TO FIND      MEDICATION NAME: Mydayis 37.5 mg daily        VITAMIN D (CHOLECALCIFEROL) PO      Take by mouth daily        * Notice:  This list has 2 medication(s) that are the same as other medications prescribed for you. Read the directions carefully, and ask your doctor or other care provider to review them with you.

## 2022-07-07 ENCOUNTER — MYC MEDICAL ADVICE (OUTPATIENT)
Dept: FAMILY MEDICINE | Facility: CLINIC | Age: 47
End: 2022-07-07

## 2022-07-07 DIAGNOSIS — R10.11 RIGHT UPPER QUADRANT PAIN: Primary | ICD-10-CM

## 2022-07-08 NOTE — TELEPHONE ENCOUNTER
Please notify patient that I have ordered HIDA scan and stool H Pylori to rule this out as cause also.  If HIDA scan is negative and H Pylori is negative, would refer to GI for further evaluation.  Julia Villa NP on 7/8/2022 at 10:48 AM

## 2022-07-08 NOTE — TELEPHONE ENCOUNTER
Called & left detailed msg on pt identified voicemail & read providers message. Number to diagnostics given. Also checked with lab & they put together stool kit for pt to .  Also sent pt mychart msg.    Isabel Ibarra RN

## 2022-08-18 ENCOUNTER — HOSPITAL ENCOUNTER (OUTPATIENT)
Dept: NUCLEAR MEDICINE | Facility: CLINIC | Age: 47
Setting detail: NUCLEAR MEDICINE
Discharge: HOME | End: 2022-08-18
Attending: NURSE PRACTITIONER | Admitting: NURSE PRACTITIONER
Payer: COMMERCIAL

## 2022-08-18 DIAGNOSIS — K82.8 DYSFUNCTIONAL GALLBLADDER: Primary | ICD-10-CM

## 2022-08-18 DIAGNOSIS — R10.11 RIGHT UPPER QUADRANT PAIN: ICD-10-CM

## 2022-08-18 PROCEDURE — 250N000011 HC RX IP 250 OP 636: Performed by: NURSE PRACTITIONER

## 2022-08-18 PROCEDURE — A9537 TC99M MEBROFENIN: HCPCS | Performed by: NURSE PRACTITIONER

## 2022-08-18 PROCEDURE — 258N000003 HC RX IP 258 OP 636: Performed by: NURSE PRACTITIONER

## 2022-08-18 PROCEDURE — 78227 HEPATOBIL SYST IMAGE W/DRUG: CPT

## 2022-08-18 PROCEDURE — 343N000001 HC RX 343: Performed by: NURSE PRACTITIONER

## 2022-08-18 RX ORDER — KIT FOR THE PREPARATION OF TECHNETIUM TC 99M MEBROFENIN 45 MG/10ML
5 INJECTION, POWDER, LYOPHILIZED, FOR SOLUTION INTRAVENOUS ONCE
Status: COMPLETED | OUTPATIENT
Start: 2022-08-18 | End: 2022-08-18

## 2022-08-18 RX ADMIN — SODIUM CHLORIDE 1.3 MCG: 9 INJECTION INTRAMUSCULAR; INTRAVENOUS; SUBCUTANEOUS at 09:22

## 2022-08-18 RX ADMIN — MEBROFENIN 5.2 MILLICURIE: 45 INJECTION, POWDER, LYOPHILIZED, FOR SOLUTION INTRAVENOUS at 08:35

## 2022-08-24 ENCOUNTER — TELEPHONE (OUTPATIENT)
Dept: SURGERY | Facility: CLINIC | Age: 47
End: 2022-08-24

## 2022-08-24 NOTE — TELEPHONE ENCOUNTER
Patient called would like to cancel her surgery and she will call back at a later date to reschedule.  Carina Easley  [facility]

## 2022-10-06 ENCOUNTER — OFFICE VISIT (OUTPATIENT)
Dept: SURGERY | Facility: CLINIC | Age: 47
End: 2022-10-06
Payer: COMMERCIAL

## 2022-10-06 VITALS
HEIGHT: 68 IN | HEART RATE: 86 BPM | BODY MASS INDEX: 21.98 KG/M2 | SYSTOLIC BLOOD PRESSURE: 136 MMHG | TEMPERATURE: 98.2 F | DIASTOLIC BLOOD PRESSURE: 92 MMHG | WEIGHT: 145 LBS

## 2022-10-06 DIAGNOSIS — K82.8 BILIARY DYSKINESIA: Primary | ICD-10-CM

## 2022-10-06 PROCEDURE — 99204 OFFICE O/P NEW MOD 45 MIN: CPT | Performed by: SURGERY

## 2022-10-06 NOTE — NURSING NOTE
"Initial BP (!) 136/92 (BP Location: Right arm, Patient Position: Sitting, Cuff Size: Adult Regular)   Pulse 86   Temp 98.2  F (36.8  C) (Tympanic)   Ht 1.727 m (5' 8\")   Wt 65.8 kg (145 lb)   BMI 22.05 kg/m   Estimated body mass index is 22.05 kg/m  as calculated from the following:    Height as of this encounter: 1.727 m (5' 8\").    Weight as of this encounter: 65.8 kg (145 lb). .    Mahsa Ortega MA    "

## 2022-10-06 NOTE — LETTER
10/6/2022         RE: Olesya Cintron  [address] [place] [address]  [place] [address]        Dear Colleague,    Thank you for referring your patient, Olesay Cintron, to the [facility]. Please see a copy of my visit note below.    Surgical Consultation/History and Physical  [facility]    Olesya is seen in consultation for abdominal pain, at the request of Bere Harris NP.    Chief Complaint:  Abdominal pain.    History of Present Illness: Olesya Cintron is a 47 year old female presents with Abdominal pain.  She admits 4 months of mid abdominal pain which radiates to her back.  Initially expected reflux this has not improved.  She now admits bloating, pain particularly with fatty foods.  Denies acholic stools or melena.    Patient Active Problem List   Diagnosis     History of urinary tract infection     Hair loss     CARDIOVASCULAR SCREENING; LDL GOAL LESS THAN 160     Pelvic pain in female     Vulvar vestibulitis     ADHD (attention deficit hyperactivity disorder)     Allergic rhinitis     Recurrent urinary tract infection     Lyme disease     Past Medical History:   Diagnosis Date     Depression     age 12     Eating disorder     anorexia age 12     Helicobacter pylori (H. pylori)     txed with abx     History of frequent urinary tract infections      MEDICAL HISTORY OF -     fx tailbone age 12     Mononucleosis     age 11     Personal history of other malignant neoplasm of skin     precancer left ear, left foot      labor ,     S/P LEEP of cervix     path results??     Past Surgical History:   Procedure Laterality Date     HC REMOVE TONSILS/ADENOIDS,11+ Y/O       LEEP TX, CERVICAL       SURGICAL HISTORY OF -   age 6    Hydrodistention of bladder     SURGICAL HISTORY OF -       Local excision of skin precancer- foot and ear     UPPER GI ENDOSCOPY      H-pylori     ZZHC COLONOSCOPY THRU STOMA, DIAGNOSTIC      normal     Family History    Problem Relation Age of Onset     Thyroid Disease Mother      Hypertension Mother      Lipids Mother      Lipids Father      Hypertension Father      Heart Disease Father         bypass surgery     Gastrointestinal Disease Father         ulcer     Alzheimer Disease Father      Prostate Cancer Paternal Grandfather      Alzheimer Disease Paternal Grandmother      Cardiovascular Maternal Grandfather         MI     Diabetes Maternal Grandfather      Breast Cancer No family hx of      Colon Cancer No family hx of      Social History     Tobacco Use     Smoking status: Former Smoker     Packs/day: 0.00     Smokeless tobacco: Never Used     Tobacco comment: uses nicotene gum on occassion   Substance Use Topics     Alcohol use: Yes     Comment: one drink per week      History   Drug Use No       Current Outpatient Medications   Medication Sig Dispense Refill     amitriptyline (ELAVIL) 10 MG tablet Take 1 tablet (10 mg) by mouth At Bedtime (Patient not taking: Reported on 6/24/2022) 90 tablet 4     amphetamine-dextroamphetamine (ADDERALL XR) 20 MG 24 hr capsule Take 20 mg by mouth daily       co-enzyme Q-10 100 MG CAPS capsule Take by mouth daily (Patient not taking: Reported on 6/24/2022)       cyclobenzaprine (FLEXERIL) 10 MG tablet Take 0.5-1 tablets (5-10 mg) by mouth 2 times daily as needed for muscle spasms 30 tablet 1     estradiol (ESTRACE) 0.1 MG/GM vaginal cream Place 2 g vaginally twice a week (Patient not taking: Reported on 6/24/2022) 42.5 g 1     Fish Oil-Cholecalciferol (FISH OIL + D3 PO)  (Patient not taking: Reported on 6/24/2022)       fluticasone (FLONASE) 50 MCG/ACT spray USE ONE TO TWO SPRAYS IN BOTH NOSTRILS ONCE DAILY AS NEEDED FOR RHINITIS OR ALLERGIES 16 g 1     norgestimate-ethinyl estradiol (ORTHO-CYCLEN) 0.25-35 MG-MCG tablet Take 1 tablet by mouth daily 84 tablet 3     Omega-3 Fatty Acids (FISH OIL) 1200 MG capsule Take 1,200 mg by mouth daily (Patient not taking: Reported on 6/24/2022)        "omeprazole (PRILOSEC) 20 MG DR capsule Take 1 capsule (20 mg) by mouth daily 90 capsule 3     order for DME Equipment being ordered: Valved chamber spacer, use with inhaler. (Patient not taking: No sig reported) 1 Device 0     traZODone (DESYREL) 25 MG TABS Take 50 mg by mouth At Bedtime 1 to 1 1/2 tablets at bedtime       VITAMIN D, CHOLECALCIFEROL, PO Take by mouth daily       Allergies   Allergen Reactions     Ciprofloxacin Nausea and Vomiting     Penicillin [Penicillins]      Hives       Review of Systems:   10 point ROS otherwise negative    Physical Exam:  BP (!) 136/92 (BP Location: Right arm, Patient Position: Sitting, Cuff Size: Adult Regular)   Pulse 86   Temp 98.2  F (36.8  C) (Tympanic)   Ht 1.727 m (5' 8\")   Wt 65.8 kg (145 lb)   BMI 22.05 kg/m      Constitutional- No acute distress, well nourished, non-toxic  Eyes: Anicteric, no injection.  PERRL  ENT:  Normocephalic, atraumatic, Nose midline, moist mucus membranes  Neck - supple, no LAD  Respiratory- Clear to auscultation bilaterally, good inspiratory effort  Cardiovascular - Heart RRR, no lift's, thrills, murmurs, rubs, or gallop.  No peripheral edema.  No clubbing.  Abdomen - Soft, non-tender, +BS, no hepatosplenomegaly, no palpable masses, negative urena's sign  Neuro - No focal neuro deficits, Alert and oriented x 3  Psych: Appropriate mood and affect  Musculoskeletal: Normal gait, symmetric strength.  FROM upper and lower extremities.  Skin: Warm, Dry    HIDA:  FINDINGS: There is prompt clearance of the radionuclide from the blood  pool into the liver. By 10 minutes there is clear visualization of the  intrahepatic ducts as well as the upper common bile duct. By 15  minutes there is visualization of the gallbladder. At 20 minutes there  is emptying from the common bile duct into the small bowel.     After CCK administration the Gallbladder ejection fraction was  measured at 17%.  The normal gallbladder EF based on a 45 minute  infusion is " >40%.                                                                      IMPRESSION:   1. No evidence for cystic or common duct obstruction or intrinsic  liver disease.  2. Abnormal gallbladder ejection fraction measuring 17%.     The normal Gall Bladder ejection fraction for a 45 minute infusion is  >40%    US Abdomen:  FINDINGS:     GALLBLADDER: Tiny 3 mm cholesterol polyp. No gallstones or signs of  cholecystitis.     BILE DUCTS: There is no biliary dilatation. The common duct measures  3mm.     LIVER: Normal where seen.     RIGHT KIDNEY: No hydronephrosis.     PANCREAS: The visualized portions of the pancreas are normal.     No ascites.                                                                      IMPRESSION:  1.  Tiny cholesterol polyp in the gallbladder. No specific follow-up  needed.  2.  Otherwise normal right upper quadrant ultrasound.      Assessment:  1. Biliary dyskinesia      Plan:   Olesya Cintron presents with bilary dyskinesia. Symptoms are progressively worsening recently, increasing in intensity and in frequency. Examination is generally unremarkable, and liver function tests are normal. Ultrasound demonstrates a single cholesterol polyp, without biliary duct dilatation, gallbladder wall thickening or pericholecystic fluid. Her HIDA showed an EF of 17% and reproduction of symptoms with CCK administration. The patient may benefit from laparoscopic cholecystectomy, and the indications, risks, benefits and alternatives to surgery were discussed in detail.  She understood the counseling offered and wishes to proceed as planned and outlined. Risks specifically discussed include  bleeding, infection, hernia, need for additional treatment, nontherapeutic intervention, wound complication (such as dehiscence), retained bile duct stone, conversion to open surgery, potential for cholangiography or bile duct exploration, drainage tubes, chronic diarrhea, bile duct injury, bile leak, and rare  complications related to surgery and/or anesthesia such as venous thromboembolism and cardiorespiratory complications.    Curry Kapoor DO on 10/6/2022 at 3:05 PM        Again, thank you for allowing me to participate in the care of your patient.        Sincerely,        Curry Kapoor DO

## 2022-10-06 NOTE — PROGRESS NOTES
Surgical Consultation/History and Physical  [facility] Surgery    Olesya is seen in consultation for abdominal pain, at the request of Bere Harris NP.    Chief Complaint:  Abdominal pain.    History of Present Illness: Olesya Cintron is a 47 year old female presents with Abdominal pain.  She admits 4 months of mid abdominal pain which radiates to her back.  Initially expected reflux this has not improved.  She now admits bloating, pain particularly with fatty foods.  Denies acholic stools or melena.    Patient Active Problem List   Diagnosis     History of urinary tract infection     Hair loss     CARDIOVASCULAR SCREENING; LDL GOAL LESS THAN 160     Pelvic pain in female     Vulvar vestibulitis     ADHD (attention deficit hyperactivity disorder)     Allergic rhinitis     Recurrent urinary tract infection     Lyme disease     Past Medical History:   Diagnosis Date     Depression     age 12     Eating disorder     anorexia age 12     Helicobacter pylori (H. pylori)     txed with abx     History of frequent urinary tract infections      MEDICAL HISTORY OF -     fx tailbone age 12     Mononucleosis     age 11     Personal history of other malignant neoplasm of skin     precancer left ear, left foot      labor ,     S/P LEEP of cervix     path results??     Past Surgical History:   Procedure Laterality Date     HC REMOVE TONSILS/ADENOIDS,12+ Y/O       LEEP TX, CERVICAL       SURGICAL HISTORY OF -   age 6    Hydrodistention of bladder     SURGICAL HISTORY OF -       Local excision of skin precancer- foot and ear     UPPER GI ENDOSCOPY      H-pylori     ZZHC COLONOSCOPY THRU STOMA, DIAGNOSTIC      normal     Family History   Problem Relation Age of Onset     Thyroid Disease Mother      Hypertension Mother      Lipids Mother      Lipids Father      Hypertension Father      Heart Disease Father         bypass surgery     Gastrointestinal Disease Father         ulcer      Alzheimer Disease Father      Prostate Cancer Paternal Grandfather      Alzheimer Disease Paternal Grandmother      Cardiovascular Maternal Grandfather         MI     Diabetes Maternal Grandfather      Breast Cancer No family hx of      Colon Cancer No family hx of      Social History     Tobacco Use     Smoking status: Former Smoker     Packs/day: 0.00     Smokeless tobacco: Never Used     Tobacco comment: uses nicotene gum on occassion   Substance Use Topics     Alcohol use: Yes     Comment: one drink per week      History   Drug Use No       Current Outpatient Medications   Medication Sig Dispense Refill     amitriptyline (ELAVIL) 10 MG tablet Take 1 tablet (10 mg) by mouth At Bedtime (Patient not taking: Reported on 6/24/2022) 90 tablet 4     amphetamine-dextroamphetamine (ADDERALL XR) 20 MG 24 hr capsule Take 20 mg by mouth daily       co-enzyme Q-10 100 MG CAPS capsule Take by mouth daily (Patient not taking: Reported on 6/24/2022)       cyclobenzaprine (FLEXERIL) 10 MG tablet Take 0.5-1 tablets (5-10 mg) by mouth 2 times daily as needed for muscle spasms 30 tablet 1     estradiol (ESTRACE) 0.1 MG/GM vaginal cream Place 2 g vaginally twice a week (Patient not taking: Reported on 6/24/2022) 42.5 g 1     Fish Oil-Cholecalciferol (FISH OIL + D3 PO)  (Patient not taking: Reported on 6/24/2022)       fluticasone (FLONASE) 50 MCG/ACT spray USE ONE TO TWO SPRAYS IN BOTH NOSTRILS ONCE DAILY AS NEEDED FOR RHINITIS OR ALLERGIES 16 g 1     norgestimate-ethinyl estradiol (ORTHO-CYCLEN) 0.25-35 MG-MCG tablet Take 1 tablet by mouth daily 84 tablet 3     Omega-3 Fatty Acids (FISH OIL) 1200 MG capsule Take 1,200 mg by mouth daily (Patient not taking: Reported on 6/24/2022)       omeprazole (PRILOSEC) 20 MG DR capsule Take 1 capsule (20 mg) by mouth daily 90 capsule 3     order for DME Equipment being ordered: Valved chamber spacer, use with inhaler. (Patient not taking: No sig reported) 1 Device 0     traZODone (DESYREL) 25  "MG TABS Take 50 mg by mouth At Bedtime 1 to 1 1/2 tablets at bedtime       VITAMIN D, CHOLECALCIFEROL, PO Take by mouth daily       Allergies   Allergen Reactions     Ciprofloxacin Nausea and Vomiting     Penicillin [Penicillins]      Hives       Review of Systems:   10 point ROS otherwise negative    Physical Exam:  BP (!) 136/92 (BP Location: Right arm, Patient Position: Sitting, Cuff Size: Adult Regular)   Pulse 86   Temp 98.2  F (36.8  C) (Tympanic)   Ht 1.727 m (5' 8\")   Wt 65.8 kg (145 lb)   BMI 22.05 kg/m      Constitutional- No acute distress, well nourished, non-toxic  Eyes: Anicteric, no injection.  PERRL  ENT:  Normocephalic, atraumatic, Nose midline, moist mucus membranes  Neck - supple, no LAD  Respiratory- Clear to auscultation bilaterally, good inspiratory effort  Cardiovascular - Heart RRR, no lift's, thrills, murmurs, rubs, or gallop.  No peripheral edema.  No clubbing.  Abdomen - Soft, non-tender, +BS, no hepatosplenomegaly, no palpable masses, negative urena's sign  Neuro - No focal neuro deficits, Alert and oriented x 3  Psych: Appropriate mood and affect  Musculoskeletal: Normal gait, symmetric strength.  FROM upper and lower extremities.  Skin: Warm, Dry    HIDA:  FINDINGS: There is prompt clearance of the radionuclide from the blood  pool into the liver. By 10 minutes there is clear visualization of the  intrahepatic ducts as well as the upper common bile duct. By 15  minutes there is visualization of the gallbladder. At 20 minutes there  is emptying from the common bile duct into the small bowel.     After CCK administration the Gallbladder ejection fraction was  measured at 17%.  The normal gallbladder EF based on a 45 minute  infusion is >40%.                                                                      IMPRESSION:   1. No evidence for cystic or common duct obstruction or intrinsic  liver disease.  2. Abnormal gallbladder ejection fraction measuring 17%.     The normal Gall " Bladder ejection fraction for a 45 minute infusion is  >40%    US Abdomen:  FINDINGS:     GALLBLADDER: Tiny 3 mm cholesterol polyp. No gallstones or signs of  cholecystitis.     BILE DUCTS: There is no biliary dilatation. The common duct measures  3mm.     LIVER: Normal where seen.     RIGHT KIDNEY: No hydronephrosis.     PANCREAS: The visualized portions of the pancreas are normal.     No ascites.                                                                      IMPRESSION:  1.  Tiny cholesterol polyp in the gallbladder. No specific follow-up  needed.  2.  Otherwise normal right upper quadrant ultrasound.      Assessment:  1. Biliary dyskinesia      Plan:   Olesya Cintron presents with bilary dyskinesia. Symptoms are progressively worsening recently, increasing in intensity and in frequency. Examination is generally unremarkable, and liver function tests are normal. Ultrasound demonstrates a single cholesterol polyp, without biliary duct dilatation, gallbladder wall thickening or pericholecystic fluid. Her HIDA showed an EF of 17% and reproduction of symptoms with CCK administration. The patient may benefit from laparoscopic cholecystectomy, and the indications, risks, benefits and alternatives to surgery were discussed in detail.  She understood the counseling offered and wishes to proceed as planned and outlined. Risks specifically discussed include  bleeding, infection, hernia, need for additional treatment, nontherapeutic intervention, wound complication (such as dehiscence), retained bile duct stone, conversion to open surgery, potential for cholangiography or bile duct exploration, drainage tubes, chronic diarrhea, bile duct injury, bile leak, and rare complications related to surgery and/or anesthesia such as venous thromboembolism and cardiorespiratory complications.    Curry Kapoor, DO on 10/6/2022 at 3:05 PM

## 2022-10-06 NOTE — H&P (VIEW-ONLY)
Surgical Consultation/History and Physical  [facility] General Surgery    Olesya is seen in consultation for abdominal pain, at the request of Bere Harris NP.    Chief Complaint:  Abdominal pain.    History of Present Illness: Olesya Cintron is a 47 year old female presents with Abdominal pain.  She admits 4 months of mid abdominal pain which radiates to her back.  Initially expected reflux this has not improved.  She now admits bloating, pain particularly with fatty foods.  Denies acholic stools or melena.    Patient Active Problem List   Diagnosis     History of urinary tract infection     Hair loss     CARDIOVASCULAR SCREENING; LDL GOAL LESS THAN 160     Pelvic pain in female     Vulvar vestibulitis     ADHD (attention deficit hyperactivity disorder)     Allergic rhinitis     Recurrent urinary tract infection     Lyme disease     Past Medical History:   Diagnosis Date     Depression     age 12     Eating disorder     anorexia age 12     Helicobacter pylori (H. pylori)     txed with abx     History of frequent urinary tract infections      MEDICAL HISTORY OF -     fx tailbone age 12     Mononucleosis     age 11     Personal history of other malignant neoplasm of skin     precancer left ear, left foot      labor ,     S/P LEEP of cervix     path results??     Past Surgical History:   Procedure Laterality Date     HC REMOVE TONSILS/ADENOIDS,11+ Y/O       LEEP TX, CERVICAL       SURGICAL HISTORY OF -   age 6    Hydrodistention of bladder     SURGICAL HISTORY OF -       Local excision of skin precancer- foot and ear     UPPER GI ENDOSCOPY      H-pylori     ZZHC COLONOSCOPY THRU STOMA, DIAGNOSTIC      normal     Family History   Problem Relation Age of Onset     Thyroid Disease Mother      Hypertension Mother      Lipids Mother      Lipids Father      Hypertension Father      Heart Disease Father         bypass surgery     Gastrointestinal Disease Father         ulcer      Alzheimer Disease Father      Prostate Cancer Paternal Grandfather      Alzheimer Disease Paternal Grandmother      Cardiovascular Maternal Grandfather         MI     Diabetes Maternal Grandfather      Breast Cancer No family hx of      Colon Cancer No family hx of      Social History     Tobacco Use     Smoking status: Former Smoker     Packs/day: 0.00     Smokeless tobacco: Never Used     Tobacco comment: uses nicotene gum on occassion   Substance Use Topics     Alcohol use: Yes     Comment: one drink per week      History   Drug Use No       Current Outpatient Medications   Medication Sig Dispense Refill     amitriptyline (ELAVIL) 10 MG tablet Take 1 tablet (10 mg) by mouth At Bedtime (Patient not taking: Reported on 6/24/2022) 90 tablet 4     amphetamine-dextroamphetamine (ADDERALL XR) 20 MG 24 hr capsule Take 20 mg by mouth daily       co-enzyme Q-10 100 MG CAPS capsule Take by mouth daily (Patient not taking: Reported on 6/24/2022)       cyclobenzaprine (FLEXERIL) 10 MG tablet Take 0.5-1 tablets (5-10 mg) by mouth 2 times daily as needed for muscle spasms 30 tablet 1     estradiol (ESTRACE) 0.1 MG/GM vaginal cream Place 2 g vaginally twice a week (Patient not taking: Reported on 6/24/2022) 42.5 g 1     Fish Oil-Cholecalciferol (FISH OIL + D3 PO)  (Patient not taking: Reported on 6/24/2022)       fluticasone (FLONASE) 50 MCG/ACT spray USE ONE TO TWO SPRAYS IN BOTH NOSTRILS ONCE DAILY AS NEEDED FOR RHINITIS OR ALLERGIES 16 g 1     norgestimate-ethinyl estradiol (ORTHO-CYCLEN) 0.25-35 MG-MCG tablet Take 1 tablet by mouth daily 84 tablet 3     Omega-3 Fatty Acids (FISH OIL) 1200 MG capsule Take 1,200 mg by mouth daily (Patient not taking: Reported on 6/24/2022)       omeprazole (PRILOSEC) 20 MG DR capsule Take 1 capsule (20 mg) by mouth daily 90 capsule 3     order for DME Equipment being ordered: Valved chamber spacer, use with inhaler. (Patient not taking: No sig reported) 1 Device 0     traZODone (DESYREL) 25  "MG TABS Take 50 mg by mouth At Bedtime 1 to 1 1/2 tablets at bedtime       VITAMIN D, CHOLECALCIFEROL, PO Take by mouth daily       Allergies   Allergen Reactions     Ciprofloxacin Nausea and Vomiting     Penicillin [Penicillins]      Hives       Review of Systems:   10 point ROS otherwise negative    Physical Exam:  BP (!) 136/92 (BP Location: Right arm, Patient Position: Sitting, Cuff Size: Adult Regular)   Pulse 86   Temp 98.2  F (36.8  C) (Tympanic)   Ht 1.727 m (5' 8\")   Wt 65.8 kg (145 lb)   BMI 22.05 kg/m      Constitutional- No acute distress, well nourished, non-toxic  Eyes: Anicteric, no injection.  PERRL  ENT:  Normocephalic, atraumatic, Nose midline, moist mucus membranes  Neck - supple, no LAD  Respiratory- Clear to auscultation bilaterally, good inspiratory effort  Cardiovascular - Heart RRR, no lift's, thrills, murmurs, rubs, or gallop.  No peripheral edema.  No clubbing.  Abdomen - Soft, non-tender, +BS, no hepatosplenomegaly, no palpable masses, negative urena's sign  Neuro - No focal neuro deficits, Alert and oriented x 3  Psych: Appropriate mood and affect  Musculoskeletal: Normal gait, symmetric strength.  FROM upper and lower extremities.  Skin: Warm, Dry    HIDA:  FINDINGS: There is prompt clearance of the radionuclide from the blood  pool into the liver. By 10 minutes there is clear visualization of the  intrahepatic ducts as well as the upper common bile duct. By 15  minutes there is visualization of the gallbladder. At 20 minutes there  is emptying from the common bile duct into the small bowel.     After CCK administration the Gallbladder ejection fraction was  measured at 17%.  The normal gallbladder EF based on a 45 minute  infusion is >40%.                                                                      IMPRESSION:   1. No evidence for cystic or common duct obstruction or intrinsic  liver disease.  2. Abnormal gallbladder ejection fraction measuring 17%.     The normal Gall " Bladder ejection fraction for a 45 minute infusion is  >40%    US Abdomen:  FINDINGS:     GALLBLADDER: Tiny 3 mm cholesterol polyp. No gallstones or signs of  cholecystitis.     BILE DUCTS: There is no biliary dilatation. The common duct measures  3mm.     LIVER: Normal where seen.     RIGHT KIDNEY: No hydronephrosis.     PANCREAS: The visualized portions of the pancreas are normal.     No ascites.                                                                      IMPRESSION:  1.  Tiny cholesterol polyp in the gallbladder. No specific follow-up  needed.  2.  Otherwise normal right upper quadrant ultrasound.      Assessment:  1. Biliary dyskinesia      Plan:   Olesya Cintron presents with bilary dyskinesia. Symptoms are progressively worsening recently, increasing in intensity and in frequency. Examination is generally unremarkable, and liver function tests are normal. Ultrasound demonstrates a single cholesterol polyp, without biliary duct dilatation, gallbladder wall thickening or pericholecystic fluid. Her HIDA showed an EF of 17% and reproduction of symptoms with CCK administration. The patient may benefit from laparoscopic cholecystectomy, and the indications, risks, benefits and alternatives to surgery were discussed in detail.  She understood the counseling offered and wishes to proceed as planned and outlined. Risks specifically discussed include  bleeding, infection, hernia, need for additional treatment, nontherapeutic intervention, wound complication (such as dehiscence), retained bile duct stone, conversion to open surgery, potential for cholangiography or bile duct exploration, drainage tubes, chronic diarrhea, bile duct injury, bile leak, and rare complications related to surgery and/or anesthesia such as venous thromboembolism and cardiorespiratory complications.    Curry Kapoor, DO on 10/6/2022 at 3:05 PM

## 2022-11-03 ENCOUNTER — ANESTHESIA EVENT (OUTPATIENT)
Dept: SURGERY | Facility: CLINIC | Age: 47
End: 2022-11-03
Payer: COMMERCIAL

## 2022-11-03 ASSESSMENT — LIFESTYLE VARIABLES: TOBACCO_USE: 1

## 2022-11-03 NOTE — ANESTHESIA PREPROCEDURE EVALUATION
Anesthesia Pre-Procedure Evaluation    Patient: Olesya Cintron   MRN: [id number] : 1975        Procedure : Procedure(s):  CHOLECYSTECTOMY, LAPAROSCOPIC          Past Medical History:   Diagnosis Date     Depression     age 12     Eating disorder     anorexia age 12     Helicobacter pylori (H. pylori)     txed with abx     History of frequent urinary tract infections      MEDICAL HISTORY OF -     fx tailbone age 12     Mononucleosis     age 11     Personal history of other malignant neoplasm of skin     precancer left ear, left foot      labor ,     S/P LEEP of cervix     path results??      Past Surgical History:   Procedure Laterality Date     HC REMOVE TONSILS/ADENOIDS,12+ Y/O       LEEP TX, CERVICAL       SURGICAL HISTORY OF -   age 6    Hydrodistention of bladder     SURGICAL HISTORY OF -       Local excision of skin precancer- foot and ear     UPPER GI ENDOSCOPY      H-pylori     ZZHC COLONOSCOPY THRU STOMA, DIAGNOSTIC      normal      Allergies   Allergen Reactions     Ciprofloxacin Nausea and Vomiting     Penicillin [Penicillins]      Hives        Social History     Tobacco Use     Smoking status: Former     Packs/day: 0.00     Types: Cigarettes     Smokeless tobacco: Never     Tobacco comments:     uses nicotene gum on occassion   Substance Use Topics     Alcohol use: Yes     Comment: one drink per week      Wt Readings from Last 1 Encounters:   10/06/22 65.8 kg (145 lb)        Anesthesia Evaluation   Pt has had prior anesthetic. Type: MAC and General.        ROS/MED HX  ENT/Pulmonary:     (+) allergic rhinitis, tobacco use, Past use,     Neurologic:       Cardiovascular:       METS/Exercise Tolerance:     Hematologic:       Musculoskeletal:       GI/Hepatic:       Renal/Genitourinary: Comment: Hx: frequent UTI      Endo:       Psychiatric/Substance Use: Comment: ADHD    (+) psychiatric history depression     Infectious Disease: Comment: Lyme Disease, H. Pylori,  Mononucleosis      Malignancy:   (+) Malignancy, History of Skin.    Other:            Physical Exam    Airway        Mallampati: II   TM distance: > 3 FB   Neck ROM: full   Mouth opening: > 3 cm    Respiratory Devices and Support  Comment: Not on oxygen currently       Dental  no notable dental history         Cardiovascular   cardiovascular exam normal          Pulmonary   pulmonary exam normal                OUTSIDE LABS:  CBC:   Lab Results   Component Value Date    WBC 6.5 07/14/2019    WBC 9.9 11/20/2011    HGB 12.8 07/14/2019    HGB 13.2 11/20/2011    HCT 38.2 07/14/2019    HCT 39.6 11/20/2011     07/14/2019     11/20/2011     BMP:   Lab Results   Component Value Date     07/14/2019     01/05/2018    POTASSIUM 3.4 07/14/2019    POTASSIUM 4.1 01/05/2018    CHLORIDE 102 07/14/2019    CHLORIDE 101 01/05/2018    CO2 26 07/14/2019    CO2 30 01/05/2018    BUN 8 07/14/2019    BUN 12 01/05/2018    CR 0.82 07/14/2019    CR 0.77 01/05/2018     (H) 07/14/2019    GLC 85 07/09/2018     COAGS: No results found for: PTT, INR, FIBR  POC:   Lab Results   Component Value Date    HCG Negative 12/05/2019    HCGS Negative 07/14/2019     HEPATIC:   Lab Results   Component Value Date    ALBUMIN 3.3 (L) 07/14/2019    PROTTOTAL 7.9 07/14/2019    ALT 42 07/14/2019    AST 35 07/14/2019    ALKPHOS 87 07/14/2019    BILITOTAL 0.2 07/14/2019     OTHER:   Lab Results   Component Value Date    LACT 1.5 07/14/2019    IRIS 8.8 07/14/2019    LIPASE 65 11/20/2011    TSH 1.31 01/28/2021    T4 0.88 05/12/2017       Anesthesia Plan    ASA Status:  2      Anesthesia Type: General.   Induction: Intravenous, Propofol.   Maintenance: TIVA.        Consents    Anesthesia Plan(s) and associated risks, benefits, and realistic alternatives discussed. Questions answered and patient/representative(s) expressed understanding.     - Discussed: Risks, Benefits and Alternatives for BOTH SEDATION and the PROCEDURE were discussed      - Discussed with:  Patient         Postoperative Care    Pain management: IV analgesics, Oral pain medications, Multi-modal analgesia.   PONV prophylaxis: Ondansetron (or other 5HT-3), Dexamethasone or Solumedrol     Comments:    Other Comments: Patient aware of plan, what to expect, and potential risks. Timeout was performed before bringing the patient back to OR, and once again, patient was asked if they had any questions            FANI Payne CRNA

## 2022-11-04 ENCOUNTER — HOSPITAL ENCOUNTER (OUTPATIENT)
Facility: CLINIC | Age: 47
Discharge: HOME | End: 2022-11-04
Attending: SURGERY | Admitting: SURGERY
Payer: COMMERCIAL

## 2022-11-04 ENCOUNTER — ANESTHESIA (OUTPATIENT)
Dept: SURGERY | Facility: CLINIC | Age: 47
End: 2022-11-04
Payer: COMMERCIAL

## 2022-11-04 VITALS
TEMPERATURE: 98 F | HEIGHT: 68 IN | DIASTOLIC BLOOD PRESSURE: 72 MMHG | WEIGHT: 145 LBS | OXYGEN SATURATION: 99 % | SYSTOLIC BLOOD PRESSURE: 112 MMHG | RESPIRATION RATE: 15 BRPM | HEART RATE: 57 BPM | BODY MASS INDEX: 21.98 KG/M2

## 2022-11-04 DIAGNOSIS — K82.8 BILIARY DYSKINESIA: Primary | ICD-10-CM

## 2022-11-04 PROCEDURE — 250N000013 HC RX MED GY IP 250 OP 250 PS 637: Performed by: NURSE ANESTHETIST, CERTIFIED REGISTERED

## 2022-11-04 PROCEDURE — 47562 LAPAROSCOPIC CHOLECYSTECTOMY: CPT | Performed by: SURGERY

## 2022-11-04 PROCEDURE — 250N000011 HC RX IP 250 OP 636

## 2022-11-04 PROCEDURE — 250N000009 HC RX 250: Performed by: SURGERY

## 2022-11-04 PROCEDURE — 710N000012 HC RECOVERY PHASE 2, PER MINUTE: Performed by: SURGERY

## 2022-11-04 PROCEDURE — 250N000025 HC SEVOFLURANE, PER MIN: Performed by: SURGERY

## 2022-11-04 PROCEDURE — 250N000009 HC RX 250

## 2022-11-04 PROCEDURE — 258N000003 HC RX IP 258 OP 636: Performed by: NURSE ANESTHETIST, CERTIFIED REGISTERED

## 2022-11-04 PROCEDURE — 250N000013 HC RX MED GY IP 250 OP 250 PS 637

## 2022-11-04 PROCEDURE — 88304 TISSUE EXAM BY PATHOLOGIST: CPT | Mod: TC | Performed by: SURGERY

## 2022-11-04 PROCEDURE — 999N000141 HC STATISTIC PRE-PROCEDURE NURSING ASSESSMENT: Performed by: SURGERY

## 2022-11-04 PROCEDURE — 250N000009 HC RX 250: Performed by: NURSE ANESTHETIST, CERTIFIED REGISTERED

## 2022-11-04 PROCEDURE — 47562 LAPAROSCOPIC CHOLECYSTECTOMY: CPT | Mod: AS | Performed by: PHYSICIAN ASSISTANT

## 2022-11-04 PROCEDURE — 360N000076 HC SURGERY LEVEL 3, PER MIN: Performed by: SURGERY

## 2022-11-04 PROCEDURE — 370N000017 HC ANESTHESIA TECHNICAL FEE, PER MIN: Performed by: SURGERY

## 2022-11-04 PROCEDURE — 250N000011 HC RX IP 250 OP 636: Performed by: SURGERY

## 2022-11-04 PROCEDURE — 271N000001 HC OR GENERAL SUPPLY NON-STERILE: Performed by: SURGERY

## 2022-11-04 PROCEDURE — 710N000009 HC RECOVERY PHASE 1, LEVEL 1, PER MIN: Performed by: SURGERY

## 2022-11-04 PROCEDURE — 258N000003 HC RX IP 258 OP 636

## 2022-11-04 PROCEDURE — 88304 TISSUE EXAM BY PATHOLOGIST: CPT | Mod: 26 | Performed by: PATHOLOGY

## 2022-11-04 PROCEDURE — 272N000001 HC OR GENERAL SUPPLY STERILE: Performed by: SURGERY

## 2022-11-04 RX ORDER — HEPARIN SODIUM 5000 [USP'U]/.5ML
5000 INJECTION, SOLUTION INTRAVENOUS; SUBCUTANEOUS
Status: DISCONTINUED | OUTPATIENT
Start: 2022-11-04 | End: 2022-11-04 | Stop reason: HOSPADM

## 2022-11-04 RX ORDER — KETOROLAC TROMETHAMINE 30 MG/ML
INJECTION, SOLUTION INTRAMUSCULAR; INTRAVENOUS PRN
Status: DISCONTINUED | OUTPATIENT
Start: 2022-11-04 | End: 2022-11-04

## 2022-11-04 RX ORDER — SODIUM CHLORIDE, SODIUM LACTATE, POTASSIUM CHLORIDE, CALCIUM CHLORIDE 600; 310; 30; 20 MG/100ML; MG/100ML; MG/100ML; MG/100ML
INJECTION, SOLUTION INTRAVENOUS CONTINUOUS
Status: DISCONTINUED | OUTPATIENT
Start: 2022-11-04 | End: 2022-11-04 | Stop reason: HOSPADM

## 2022-11-04 RX ORDER — ONDANSETRON 4 MG/1
4 TABLET, ORALLY DISINTEGRATING ORAL EVERY 30 MIN PRN
Status: COMPLETED | OUTPATIENT
Start: 2022-11-04 | End: 2022-11-04

## 2022-11-04 RX ORDER — FENTANYL CITRATE 50 UG/ML
25 INJECTION, SOLUTION INTRAMUSCULAR; INTRAVENOUS EVERY 5 MIN PRN
Status: DISCONTINUED | OUTPATIENT
Start: 2022-11-04 | End: 2022-11-04 | Stop reason: HOSPADM

## 2022-11-04 RX ORDER — HYDROMORPHONE HCL IN WATER/PF 6 MG/30 ML
0.2 PATIENT CONTROLLED ANALGESIA SYRINGE INTRAVENOUS EVERY 5 MIN PRN
Status: DISCONTINUED | OUTPATIENT
Start: 2022-11-04 | End: 2022-11-04 | Stop reason: HOSPADM

## 2022-11-04 RX ORDER — MEPERIDINE HYDROCHLORIDE 25 MG/ML
12.5 INJECTION INTRAMUSCULAR; INTRAVENOUS; SUBCUTANEOUS
Status: DISCONTINUED | OUTPATIENT
Start: 2022-11-04 | End: 2022-11-04 | Stop reason: HOSPADM

## 2022-11-04 RX ORDER — OXYCODONE HYDROCHLORIDE 5 MG/1
5 TABLET ORAL
Status: DISCONTINUED | OUTPATIENT
Start: 2022-11-04 | End: 2022-11-04 | Stop reason: HOSPADM

## 2022-11-04 RX ORDER — NALOXONE HYDROCHLORIDE 0.4 MG/ML
0.2 INJECTION, SOLUTION INTRAMUSCULAR; INTRAVENOUS; SUBCUTANEOUS
Status: DISCONTINUED | OUTPATIENT
Start: 2022-11-04 | End: 2022-11-04 | Stop reason: HOSPADM

## 2022-11-04 RX ORDER — DEXAMETHASONE SODIUM PHOSPHATE 4 MG/ML
INJECTION, SOLUTION INTRA-ARTICULAR; INTRALESIONAL; INTRAMUSCULAR; INTRAVENOUS; SOFT TISSUE PRN
Status: DISCONTINUED | OUTPATIENT
Start: 2022-11-04 | End: 2022-11-04

## 2022-11-04 RX ORDER — FENTANYL CITRATE 50 UG/ML
25 INJECTION, SOLUTION INTRAMUSCULAR; INTRAVENOUS
Status: DISCONTINUED | OUTPATIENT
Start: 2022-11-04 | End: 2022-11-04 | Stop reason: HOSPADM

## 2022-11-04 RX ORDER — PROPOFOL 10 MG/ML
INJECTION, EMULSION INTRAVENOUS PRN
Status: DISCONTINUED | OUTPATIENT
Start: 2022-11-04 | End: 2022-11-04

## 2022-11-04 RX ORDER — OXYCODONE HYDROCHLORIDE 5 MG/1
5 TABLET ORAL EVERY 4 HOURS PRN
Status: DISCONTINUED | OUTPATIENT
Start: 2022-11-04 | End: 2022-11-04 | Stop reason: HOSPADM

## 2022-11-04 RX ORDER — DOCUSATE SODIUM 100 MG/1
100 CAPSULE, LIQUID FILLED ORAL 2 TIMES DAILY
Refills: 0 | COMMUNITY
Start: 2022-11-04

## 2022-11-04 RX ORDER — OXYCODONE HYDROCHLORIDE 5 MG/1
5 TABLET ORAL EVERY 6 HOURS PRN
Qty: 12 TABLET | Refills: 0 | Status: SHIPPED | OUTPATIENT
Start: 2022-11-04 | End: 2022-11-07

## 2022-11-04 RX ORDER — FENTANYL CITRATE 0.05 MG/ML
INJECTION, SOLUTION INTRAMUSCULAR; INTRAVENOUS PRN
Status: DISCONTINUED | OUTPATIENT
Start: 2022-11-04 | End: 2022-11-04

## 2022-11-04 RX ORDER — INDOCYANINE GREEN AND WATER 25 MG
2.5 KIT INJECTION ONCE
Status: COMPLETED | OUTPATIENT
Start: 2022-11-04 | End: 2022-11-04

## 2022-11-04 RX ORDER — IBUPROFEN 600 MG/1
600 TABLET, FILM COATED ORAL
Status: DISCONTINUED | OUTPATIENT
Start: 2022-11-04 | End: 2022-11-04 | Stop reason: HOSPADM

## 2022-11-04 RX ORDER — LIDOCAINE 40 MG/G
CREAM TOPICAL
Status: DISCONTINUED | OUTPATIENT
Start: 2022-11-04 | End: 2022-11-04 | Stop reason: HOSPADM

## 2022-11-04 RX ORDER — NALOXONE HYDROCHLORIDE 0.4 MG/ML
0.4 INJECTION, SOLUTION INTRAMUSCULAR; INTRAVENOUS; SUBCUTANEOUS
Status: DISCONTINUED | OUTPATIENT
Start: 2022-11-04 | End: 2022-11-04 | Stop reason: HOSPADM

## 2022-11-04 RX ORDER — BUPIVACAINE HYDROCHLORIDE 5 MG/ML
INJECTION, SOLUTION PERINEURAL PRN
Status: DISCONTINUED | OUTPATIENT
Start: 2022-11-04 | End: 2022-11-04 | Stop reason: HOSPADM

## 2022-11-04 RX ORDER — CLINDAMYCIN PHOSPHATE 900 MG/50ML
900 INJECTION, SOLUTION INTRAVENOUS
Status: COMPLETED | OUTPATIENT
Start: 2022-11-04 | End: 2022-11-04

## 2022-11-04 RX ORDER — ACETAMINOPHEN 325 MG/1
975 TABLET ORAL ONCE
Status: COMPLETED | OUTPATIENT
Start: 2022-11-04 | End: 2022-11-04

## 2022-11-04 RX ORDER — CLINDAMYCIN PHOSPHATE 900 MG/50ML
900 INJECTION, SOLUTION INTRAVENOUS SEE ADMIN INSTRUCTIONS
Status: DISCONTINUED | OUTPATIENT
Start: 2022-11-04 | End: 2022-11-04 | Stop reason: HOSPADM

## 2022-11-04 RX ORDER — ONDANSETRON 2 MG/ML
4 INJECTION INTRAMUSCULAR; INTRAVENOUS EVERY 30 MIN PRN
Status: COMPLETED | OUTPATIENT
Start: 2022-11-04 | End: 2022-11-04

## 2022-11-04 RX ADMIN — PHENYLEPHRINE HYDROCHLORIDE 100 MCG: 10 INJECTION INTRAVENOUS at 14:05

## 2022-11-04 RX ADMIN — PHENYLEPHRINE HYDROCHLORIDE 100 MCG: 10 INJECTION INTRAVENOUS at 13:58

## 2022-11-04 RX ADMIN — LIDOCAINE HYDROCHLORIDE 0.1 ML: 10 INJECTION, SOLUTION EPIDURAL; INFILTRATION; INTRACAUDAL; PERINEURAL at 13:03

## 2022-11-04 RX ADMIN — DEXAMETHASONE SODIUM PHOSPHATE 8 MG: 4 INJECTION, SOLUTION INTRA-ARTICULAR; INTRALESIONAL; INTRAMUSCULAR; INTRAVENOUS; SOFT TISSUE at 13:25

## 2022-11-04 RX ADMIN — FENTANYL CITRATE 25 MCG: 50 INJECTION INTRAVENOUS at 13:45

## 2022-11-04 RX ADMIN — FENTANYL CITRATE 25 MCG: 50 INJECTION INTRAVENOUS at 13:25

## 2022-11-04 RX ADMIN — PHENYLEPHRINE HYDROCHLORIDE 100 MCG: 10 INJECTION INTRAVENOUS at 13:53

## 2022-11-04 RX ADMIN — FENTANYL CITRATE 50 MCG: 50 INJECTION INTRAVENOUS at 13:21

## 2022-11-04 RX ADMIN — OXYCODONE HYDROCHLORIDE 5 MG: 5 TABLET ORAL at 15:53

## 2022-11-04 RX ADMIN — KETOROLAC TROMETHAMINE 30 MG: 30 INJECTION, SOLUTION INTRAMUSCULAR at 14:15

## 2022-11-04 RX ADMIN — MIDAZOLAM HYDROCHLORIDE 2 MG: 1 INJECTION, SOLUTION INTRAMUSCULAR; INTRAVENOUS at 13:22

## 2022-11-04 RX ADMIN — PHENYLEPHRINE HYDROCHLORIDE 100 MCG: 10 INJECTION INTRAVENOUS at 13:40

## 2022-11-04 RX ADMIN — ACETAMINOPHEN 975 MG: 325 TABLET, FILM COATED ORAL at 13:03

## 2022-11-04 RX ADMIN — FENTANYL CITRATE 50 MCG: 50 INJECTION INTRAVENOUS at 14:24

## 2022-11-04 RX ADMIN — SUGAMMADEX 200 MG: 100 INJECTION, SOLUTION INTRAVENOUS at 14:27

## 2022-11-04 RX ADMIN — ONDANSETRON 4 MG: 2 INJECTION INTRAMUSCULAR; INTRAVENOUS at 15:16

## 2022-11-04 RX ADMIN — MIDAZOLAM HYDROCHLORIDE 3 MG: 1 INJECTION, SOLUTION INTRAMUSCULAR; INTRAVENOUS at 13:16

## 2022-11-04 RX ADMIN — ONDANSETRON 4 MG: 2 INJECTION INTRAMUSCULAR; INTRAVENOUS at 14:14

## 2022-11-04 RX ADMIN — Medication 2.5 MG: at 13:04

## 2022-11-04 RX ADMIN — PHENYLEPHRINE HYDROCHLORIDE 100 MCG: 10 INJECTION INTRAVENOUS at 13:43

## 2022-11-04 RX ADMIN — PROPOFOL 150 MG: 10 INJECTION, EMULSION INTRAVENOUS at 13:21

## 2022-11-04 RX ADMIN — CLINDAMYCIN PHOSPHATE 900 MG: 900 INJECTION, SOLUTION INTRAVENOUS at 13:16

## 2022-11-04 RX ADMIN — SODIUM CHLORIDE, POTASSIUM CHLORIDE, SODIUM LACTATE AND CALCIUM CHLORIDE: 600; 310; 30; 20 INJECTION, SOLUTION INTRAVENOUS at 13:02

## 2022-11-04 RX ADMIN — ROCURONIUM BROMIDE 50 MG: 50 INJECTION, SOLUTION INTRAVENOUS at 13:22

## 2022-11-04 ASSESSMENT — ACTIVITIES OF DAILY LIVING (ADL)
ADLS_ACUITY_SCORE: 35
ADLS_ACUITY_SCORE: 35

## 2022-11-04 NOTE — OP NOTE
Procedure Date: November 4, 2022    Pre-operative Diagnosis: Biliary dyskinesia    Post-operative Diagnosis:  Same    Procedure Performed: Laparoscopic Cholecystectomy    Surgeon: Curry Kapoor DO    Assistants: Harrison Neal PA-C (needed for retraction, suction, assistance with closure)    Anesthesia Type: General plus local    Findings: Critical view of safety, single gallstone    Estimated Blood Loss: 3 mL           Condition: Stable    Indications:   Ms. Olesya Cintron presents with right upper abdominal pain after meals, and characteristics of gallbladder polyp on routine ultrasound, HIDA showed diminished ejection fraction with reproduction of symptoms. She may benefit from cholecystectomy, and was advised of the indications, alternatives, benefits, and risks (including, but not limited to, bleeding, infection, conversion to open surgery, potential for bile leak or bile duct injury, MI, DVT/PE, or death). She understood the information and consented to the aforementioned operative procedure      Procedure: The patient was brought to the Operating Room and placed on the operating table in a supine position. After induction of general endotracheal anesthesia, the abdomen was prepped and draped in the usual sterile fashion. The abdomen was entered through an infraumbilical incision using an open Britt approach, a 5mm trochar was delivered under direct visualization, and the abdomen was insufflated with CO2 gas to a pressure of 15mmHg. Standard laparoscopic cholecystectomy ports were placed in the epigastrium, right midclavicular line and right anterior axillary line. The gallbladder was identified and the fundus retracted superiorly. The cystic duct and artery were carefully dissected to establish the critical view of safety.  A small tear was created in infundibulum during dissection was controlled with a single clip.  After confirming critical view of safety with indocyanine green, both structures were  secured with two hemoclips proximal and one hemooclip distal, prior to division. The gallbladder was then dissected from the liver bed using electrocautery. The gallbladder was retrieved through the epigastric port, and the trochar replaced. Once pneumoperitoneum was reestablished, the gallbladder bed was inspected for bleeding and bile leak and no bile leak was noted.  Bleeding points were controlled with cautery, a small ooze from the peritoneum at the cystic duct stump was controlled with a piece of surgicel. The patient was positioned flat, irrigant solution was suctioned free, and pneumoperitoneum was relieved before removing the trocars. The fascia of the infraumbilical port was closed with interrupted 0-Vicryl. Wound closure at all incision was accomplished with 4-0 Monocryl, and the sites cleansed and dried prior to application of sterile glue.  The specimen was opened on the back table showing a single stone. The patient tolerated the procedure well, was extubated in the Operating Room without incident, and transferred to the recovery room in stable condition.     Curry Kapoor DO on 11/4/2022 at 2:30 PM

## 2022-11-04 NOTE — DISCHARGE INSTRUCTIONS
HOME CARE FOLLOWING ABDOMINAL SURGERY    INCISIONAL CARE:  Replace the bandage over your incision (or incisions) until all drainage stops, or if more comfortable to have in place.  If present, leave the steri-strips (white paper tapes) in place for 14 days after surgery.  If you have staples in your incision at the time of discharge, they will be removed at your follow-up appointment.  If Dermabond (a type of skin glue) is present, leave in place until it wears/flakes off.     BATHING:  Avoid baths for 1 week after surgery.  Showers are okay.  You may wash your hair at any time.  Gently pat your incision dry after bathing.    ACTIVITY:  Light Activity -- you may immediately be up and about as tolerated.  Driving -- you may drive when comfortable and off narcotic pain medications (example: Norco, Percocet, Hydrocodone).  Light Work -- resume when comfortable off pain medications.  (If you can drive, you probably can work.)  Strenuous Work/Activity -- limit lifting to 20 pounds for 4 weeks.  Then, progressively increase with time.  Active Sports (running, biking, etc.) -- cautiously resume after 6 weeks.  Most importantly listen to your body.  If an activity causes pain or discomfort please stop and try in a few days or decrease your intensity.    DISCOMFORT:  Use pain medications as prescribed by your surgeon.  Take the pain medication with some food, when possible, to minimize side effects.  Expect gradual improvement.      Narcotic Pain Medications:  Do not drive, make important decisions or operate heavy machinery while on narcotic pain medications.  Narcotic pain medications can be associated with nausea, sleep disturbance, and constipation.  Unless directed otherwise, please take an over the counter stool softener (Colace 100mg twice a day) unless directed otherwise.  As soon as you are able to stop narcotic pain medications the better. Long term use of narcotics is associated with tolerance (the need for more  medication for effect) and potential addiction.    DIET:  Return to diet you were on before surgery, unless you are given specific diet instructions.  Drink plenty of fluids.  While taking pain medications, increase dietary fiber or add a fiber supplementation like Metamucil or Citrucel to help prevent constipation - a possible side effect of pain medications.    NAUSEA:  If nauseated from the anesthetic/pain meds; rest in bed, get up cautiously with assistance, and drink clear liquids (juice, tea, broth).    RETURN APPOINTMENT:  Schedule a follow-up visit 2-3 weeks after discharge from the hospital.  Office Phone: [phone]     CONTACT US IF THE FOLLOWING DEVELOPS:   1. A fever that is above 101     2. If there is a large amount of drainage, bleeding, or swelling.   3. Severe pain that is not relieved by your prescription.   4. Drainage that is thick, cloudy, yellow, green or white.   5. Any other questions not answered by  Frequently Asked Questions  sheet.      FREQUENTLY ASKED QUESTIONS:    Q:  How should my incision look?    A:  Normally your incision will appear slightly swollen with light redness directly along the incision itself as it heals.  It may feel like a bump or ridge as the healing/scarring happens, and over time (3-4 months) this bump or ridge feeling should slowly go away.  In general, clear or pink watery drainage can be normal at first as your incision heals, but should decrease over time.    Q:  How do I know if my incision is infected?  A:  Look at your incision for signs of infection, like redness around the incision spreading to surrounding skin, or drainage of cloudy or foul-smelling drainage.  If you feel warm, check your temperature to see if you are running a fever.    **If any of these things occur, please notify the nurse at our office.  We may need you to come into the office for an incision check.      Q:  How do I take care of my incision?  A:  If you have a dressing in place -  Starting the day after surgery, replace the dressing 1-2 times a day until there is no further drainage from the incision.  At that time, a dressing is no longer needed.  Try to minimize tape on the skin if irritation is occurring at the tape sites.  If you have significant irritation from tape on the skin, please call the office to discuss other method of dressing your incision.    Small pieces of tape called  steri-strips  may be present directly overlying your incision; these may be removed 10 days after surgery unless otherwise specified by your surgeon.  If these tapes start to loosen at the ends, you may trim them back until they fall off or are removed.    A:  If you had  Dermabond  tissue glue used as a dressing (this causes your incision to look shiny with a clear covering over it) - This type of dressing wears off with time and does not require more dressings over the top unless it is draining around the glue as it wears off.  Do not apply ointments or lotions over the incisions until the glue has completely worn off.    Q:  There is a piece of tape or a sticky  lead  still on my skin.  Can I remove this?  A:  Sometimes the sticky  leads  used for monitoring during surgery or for evaluation in the emergency department are not all removed while you are in the hospital.  These sometimes have a tab or metal dot on them.  You can easily remove these on your own, like taking off a band-aid.  If there is a gel substance under the  lead , simply wipe/clean it off with a washcloth or paper towel.      Q:  What can I do to minimize constipation (very hard stools, or lack of stools)?  A:  Stay well hydrated.  Increase your dietary fiber intake or take a fiber supplement -with plenty of water.  Walk around frequently.  You may consider an over-the-counter stool-softener.  Your Pharmacist can assist you with choosing one that is stocked at your pharmacy.  Constipation is also one of the most common side effects of  pain medication.  If you are using pain medication, be pro-active and try to PREVENT problems with constipation by taking the steps above BEFORE constipation becomes a problem.    Q:  What do I do if I need more pain medications?  A:  Call the office to receive refills.  Be aware that certain pain meds cannot be called into a pharmacy and actually require a paper prescription.  A change may be made in your pain med as you progress thru your recovery period or if you have side effects to certain meds.    --Pain meds are NOT refilled after 5pm on weekdays, and NOT AT ALL on the weekends, so please look ahead to prevent problems.      Q:  Why am I having a hard time sleeping now that I am at home?  A:  Many medications you receive while you are in the hospital can impact your sleep for a number of days after your surgery/hospitalization.  Decreased level of activity and naps during the day may also make sleeping at night difficult.  Try to minimize day-time naps, and get up frequently during the day to walk around your home during your recovery time.  Sleep aides may be of some help, but are not recommended for long-term use.      Q:  I am having some back discomfort.  What should I do?  A:  This may be related to certain positioning that was required for your surgery, extended periods of time in bed, or other changes in your overall activity level.  You may try ice, heat, acetaminophen, or ibuprofen to treat this temporarily.  Note that many pain medications have acetaminophen in them and would state this on the prescription bottle.  Be sure not to exceed the maximum of 4000mg per day of acetaminophen.     **If the pain you are having does not resolve, is severe, or is a flare of back pain you have had on other occasions prior to surgery, please contact your primary physician for further recommendations or for an appointment to be examined at their office.    Q:  Why am I having headaches?  A:  Headaches can be caused  by many things:  caffeine withdrawal, use of pain meds, dehydration, high blood pressure, lack of sleep, over-activity/exhaustion, flare-up of usual migraine headaches.  If you feel this is related to muscle tension (a band-like feeling around the head, or a pressure at the low-back of the head) you may try ice or heat to this area.  You may need to drink more fluids (try electrolyte drink like Gatorade), rest, or take your usual migraine medications.   **If your headaches do not resolve, worsen, are accompanied by other symptoms, or if your blood pressure is high, please call your primary physician for recommendation and/or examination.    Q:  I am unable to urinate.  What do I do?  A:  A small percentage of people can have difficulty urinating initially after surgery.  This includes being able to urinate only a very small amount at a time and feeling discomfort or pressure in the very low abdomen.  This is called  urinary retention , and is actually an urgent situation.  Proceed to your nearest Emergency department for evaluation (not an Urgent Care Center).  Sometimes the bladder does not work correctly after certain medications you receive during surgery, or related to certain procedures.  You may need to have a catheter placed until your bladder recovers.  When planning to go to an Emergency department, it may help to call the ER to let them know you are coming in for this problem after a surgery.  This may help you get in quicker to be evaluated.  **If you have symptoms of a urinary tract infection, please contact your primary physician for the proper evaluation and treatment.          If you have other questions, please call the office Monday thru Friday between 8am and 5pm to discuss with the nurse.  # [phone]    There is a surgeon ON CALL on weekday evenings and over the weekend in case of urgent need only, and may be contacted at the same number.    If you are having an emergency, call 911 or proceed  to your nearest emergency department.                              Same Day Surgery Discharge Instructions  Special Precautions After Surgery - Adult    It is not unusual to feel lightheaded or faint, up to 24 hours after surgery or while taking pain medication.  If you have these symptoms; sit for a few minutes before standing and have someone assist you when getting up.  You should rest and relax for the next 24 hours and must have someone stay with you for at least 24 hours after your discharge.  DO NOT DRIVE any vehicle or operate mechanical equipment for 24 hours following the end of your surgery.  DO NOT DRIVE while taking narcotic pain medications that have been prescribed by your physician.  If you had a limb operated on, you must be able to use it fully to drive.  DO NOT drink alcoholic beverages for 24 hours following surgery or while taking prescription pain medication.  Drink clear liquids (apple juice, ginger ale, broth, 7-Up, etc.).  Progress to your regular diet as you feel able.  Any questions call your physician and do not make important decisions for 24 hours.    Nausea and Vomiting: Nausea and vomiting can occur any time after receiving anesthesia. If you experience nausea and vomiting we encourage you to move to a clear liquid diet and advance your diet as tolerated. If nausea and vomiting do not improve within 12 hours please call the surgeon or present to the Emergency department.     Break-through Bleeding: If your experience bleeding from your surgical site apply pressure and additional dressing per nurse instruction. For simple problems such as a saturated dressing, you may need to reinforce the dressing with more gauze and tape and put slight pressure on the site. If bleeding does not subside contact the surgeon or present to the Emergency Department.    Post-op Infection: If you develop a fever of 100.4 or greater, have pus like drainage, redness, swelling or severe pain at the surgical  site not alleviated with pain medications; please contact the surgeon or present to the Emergency Department.     ***Take Ibuprofen/Motrin- starting tonight 8:15 pm.***  __________________________________________________________________________________________________________________________________  IMPORTANT NUMBERS:    [facility] [place]:  [phone], [phone]  Pharmacy:  [phone]  Same Day Surgery:  [phone], for general post-op questions call Monday - Thursday until 8:30 p.m., Fridays until 6:00 p.m.  Nurse Advice Line:  [phone]                                                                          [facility]:  [phone]

## 2022-11-04 NOTE — ANESTHESIA CARE TRANSFER NOTE
Patient: Olesya Cintron    Procedure: Procedure(s):  CHOLECYSTECTOMY, LAPAROSCOPIC       Diagnosis: Biliary dyskinesia [K82.8]  Diagnosis Additional Information: No value filed.    Anesthesia Type:   General     Note:    Oropharynx: oropharynx clear of all foreign objects  Level of Consciousness: drowsy      Independent Airway: airway patency satisfactory and stable  Dentition: dentition unchanged  Vital Signs Stable: post-procedure vital signs reviewed and stable  Report to RN Given: handoff report given  Patient transferred to: PACU    Handoff Report: Identifed the Patient, Identified the Reponsible Provider, Reviewed the pertinent medical history, Discussed the surgical course, Reviewed Intra-OP anesthesia mangement and issues during anesthesia, Set expectations for post-procedure period and Allowed opportunity for questions and acknowledgement of understanding      Vitals:  Vitals Value Taken Time   BP     Temp     Pulse 66 11/04/22 1441   Resp 31 11/04/22 1441   SpO2 100 % 11/04/22 1441   Vitals shown include unvalidated device data.    Electronically Signed By: FANI Keenan CRNA  November 4, 2022  2:41 PM

## 2022-11-04 NOTE — ANESTHESIA POSTPROCEDURE EVALUATION
Patient: Olesya Cintron    Procedure: Procedure(s):  CHOLECYSTECTOMY, LAPAROSCOPIC       Anesthesia Type:  General    Note:  Disposition: Outpatient   Postop Pain Control: Uneventful            Sign Out: Well controlled pain   PONV: No   Neuro/Psych: Uneventful            Sign Out: Acceptable/Baseline neuro status   Airway/Respiratory: Uneventful            Sign Out: AIRWAY IN SITU/Resp. Support   CV/Hemodynamics: Uneventful            Sign Out: Acceptable CV status; No obvious hypovolemia; No obvious fluid overload   Other NRE: NONE   DID A NON-ROUTINE EVENT OCCUR? No           Last vitals:  Vitals Value Taken Time   BP     Temp     Pulse 66 11/04/22 1441   Resp 31 11/04/22 1441   SpO2 100 % 11/04/22 1441   Vitals shown include unvalidated device data.    Electronically Signed By: FANI Keenan CRNA  November 4, 2022  2:42 PM

## 2022-11-08 LAB
PATH REPORT.COMMENTS IMP SPEC: NORMAL
PATH REPORT.COMMENTS IMP SPEC: NORMAL
PATH REPORT.FINAL DX SPEC: NORMAL
PATH REPORT.GROSS SPEC: NORMAL
PATH REPORT.MICROSCOPIC SPEC OTHER STN: NORMAL
PATH REPORT.RELEVANT HX SPEC: NORMAL
PHOTO IMAGE: NORMAL

## 2022-11-17 ENCOUNTER — OFFICE VISIT (OUTPATIENT)
Dept: SURGERY | Facility: CLINIC | Age: 47
End: 2022-11-17
Payer: COMMERCIAL

## 2022-11-17 VITALS
SYSTOLIC BLOOD PRESSURE: 124 MMHG | OXYGEN SATURATION: 100 % | BODY MASS INDEX: 22.84 KG/M2 | HEART RATE: 90 BPM | WEIGHT: 148 LBS | DIASTOLIC BLOOD PRESSURE: 84 MMHG

## 2022-11-17 DIAGNOSIS — Z90.49 S/P LAPAROSCOPIC CHOLECYSTECTOMY: Primary | ICD-10-CM

## 2022-11-17 PROCEDURE — 99024 POSTOP FOLLOW-UP VISIT: CPT | Performed by: SURGERY

## 2022-11-17 NOTE — NURSING NOTE
No chief complaint on file.      Vitals:    11/17/22 1406   BP: 124/84   BP Location: Left arm   Patient Position: Sitting   Cuff Size: Adult Regular   Pulse: 90   SpO2: 100%   Weight: 65.8 kg (145 lb)     Wt Readings from Last 1 Encounters:   11/17/22 65.8 kg (145 lb)       Gricelda Alex MA

## 2022-11-17 NOTE — PROGRESS NOTES
General Surgery Post Op    Pt returns for follow up visit s/p cholecystectomy on 11/4/22.    Patient has been doing well, tolerating diet. Bowels moving well. Pain controlled. No issues with wound healing/redness/drainage. No fevers.      Physical exam: Vitals: /84 (BP Location: Left arm, Patient Position: Sitting, Cuff Size: Adult Regular)   Pulse 90   Wt 67.1 kg (148 lb)   SpO2 100%   BMI 22.84 kg/m    BMI= Body mass index is 22.84 kg/m .    Exam:  Constitutional: healthy, alert and no distress  Cardiovascular: negative  Respiratory: negative  Gastrointestinal: Abdomen soft, non-tender. BS normal. No masses, organomegaly  Incisions C/D/I    Path:  Final Diagnosis   Gallbladder, cholecystectomy:  - Cholesterolosis.  - Negative for malignancy.       Assessment:     ICD-10-CM    1. S/P laparoscopic cholecystectomy  Z90.49         Plan: Olesya Cintron was seen for follow-up after laparoscopic cholecystectomy.  Patient is doing well and recovering without issue at this time.  We reviewed the pathology which was benign.  We discussed routine post-operative care of wounds including avoiding sun exposure to wounds to limit scarring, no need for overlying ointments, and weight restrictions going forward.  Patient was instructed to call with any questions or concerns.  Olesya Cintron can follow-up on an as needed basis.    Curry Kapoor,  on 11/17/2022 at 1:58 PM

## 2022-11-17 NOTE — LETTER
11/17/2022         RE: Olesya Cintron  [address] [place] [address]  [place] [address]        Dear Colleague,    Thank you for referring your patient, Olesya Cintron, to the [facility]. Please see a copy of my visit note below.    General Surgery Post Op    Pt returns for follow up visit s/p cholecystectomy on 11/4/22.    Patient has been doing well, tolerating diet. Bowels moving well. Pain controlled. No issues with wound healing/redness/drainage. No fevers.      Physical exam: Vitals: /84 (BP Location: Left arm, Patient Position: Sitting, Cuff Size: Adult Regular)   Pulse 90   Wt 67.1 kg (148 lb)   SpO2 100%   BMI 22.84 kg/m    BMI= Body mass index is 22.84 kg/m .    Exam:  Constitutional: healthy, alert and no distress  Cardiovascular: negative  Respiratory: negative  Gastrointestinal: Abdomen soft, non-tender. BS normal. No masses, organomegaly  Incisions C/D/I    Path:  Final Diagnosis   Gallbladder, cholecystectomy:  - Cholesterolosis.  - Negative for malignancy.       Assessment:     ICD-10-CM    1. S/P laparoscopic cholecystectomy  Z90.49         Plan: Olesya Cintron was seen for follow-up after laparoscopic cholecystectomy.  Patient is doing well and recovering without issue at this time.  We reviewed the pathology which was benign.  We discussed routine post-operative care of wounds including avoiding sun exposure to wounds to limit scarring, no need for overlying ointments, and weight restrictions going forward.  Patient was instructed to call with any questions or concerns.  Olesya Cintron can follow-up on an as needed basis.    Curry Kapoor DO on 11/17/2022 at 1:58 PM          Again, thank you for allowing me to participate in the care of your patient.        Sincerely,        Curry Kapoor DO

## 2022-11-19 ENCOUNTER — HEALTH MAINTENANCE LETTER (OUTPATIENT)
Age: 47
End: 2022-11-19

## 2022-12-13 ENCOUNTER — PATIENT OUTREACH (OUTPATIENT)
Dept: OBGYN | Facility: CLINIC | Age: 47
End: 2022-12-13

## 2022-12-13 NOTE — TELEPHONE ENCOUNTER
Panel Management Review        Health Maintenance List    Health Maintenance   Topic Date Due     ADVANCE CARE PLANNING  Never done     HEPATITIS B IMMUNIZATION (1 of 3 - 3-dose series) Never done     COLORECTAL CANCER SCREENING  Never done     HEPATITIS C SCREENING  Never done     COVID-19 Vaccine (3 - Booster for Moderna series) 05/28/2021     YEARLY PREVENTIVE VISIT  08/04/2021     MAMMO SCREENING  10/26/2021     HPV TEST  06/25/2022     PAP  06/25/2022     INFLUENZA VACCINE (1) 09/01/2022     DTAP/TDAP/TD IMMUNIZATION (3 - Td or Tdap) 10/17/2022     LIPID  07/09/2023     HIV SCREENING  Completed     PHQ-2 (once per calendar year)  Completed     Pneumococcal Vaccine: Pediatrics (0 to 5 Years) and At-Risk Patients (6 to 64 Years)  Aged Out     IPV IMMUNIZATION  Aged Out     MENINGITIS IMMUNIZATION  Aged Out       Composite cancer screening  Chart review shows that this patient is due/due soon for the following Pap Smear, Mammogram and Colonoscopy  Lab Results   Component Value Date    PAP NIL 06/25/2019     Past Surgical History:   Procedure Laterality Date     HC REMOVE TONSILS/ADENOIDS,12+ Y/O       LAPAROSCOPIC CHOLECYSTECTOMY N/A 11/4/2022    Procedure: CHOLECYSTECTOMY, LAPAROSCOPIC;  Surgeon: Curry Kapoor, ;  Location: [facility] OR     LEEP TX, CERVICAL  1995     SURGICAL HISTORY OF -   age 6    Hydrodistention of bladder     SURGICAL HISTORY OF -       Local excision of skin precancer- foot and ear     UPPER GI ENDOSCOPY  8/07    H-pylori     [facility] COLONOSCOPY THRU STOMA, DIAGNOSTIC  8/07    normal       Is hysterectomy listed in surgical history? No   Is mastectomy listed in surgical history? No     Summary:    Patient is due/failing the following:   Pap Smear, Mammogram and Colonoscopy    Action needed: Patient needs office visit for pap smear, colonoscopy, mammogram.    Type of outreach:  Sent [employer] message.      Staff Signature: Sophia Ventura CMA

## 2022-12-13 NOTE — LETTER
2022      Olesya Cintron  [address] [place] [address]            Dear Olesya,    To ensure we are providing the best quality care, we have reviewed your chart and see that you are due for:    Cervical Cancer Screening:    Please call Dept: [phone] to schedule an Annual Exam with Pap Smear.    Breast Cancer Screening:    Please call [facility]  at [phone] to schedule a Mammogram.    Colon Cancer Screening:    If you have received a referral to schedule a Colonoscopy or choose to do a Colonoscopy instead of the FIT/ Cologuard test, please call [phone] to schedule.    If you have received a FIT test kit from a prior office visit, please check the expiration date. If , please get a new kit from the Lab/ Clinic. If not , please complete the test and mail in as soon as you are able.    If you would prefer to complete a Cologuard test, please reach out to your provider to see if this is an option for you.    You may call Dept: [phone] if you have any questions. If you have completed the tests outside of [facility], please have the results forwarded to our office Fax # [phone]. We will update the chart for your primary Physician to review before your next annual physical.    Sincerely,  Narcisa Carey MD

## 2023-02-02 ENCOUNTER — OFFICE VISIT (OUTPATIENT)
Dept: FAMILY MEDICINE | Facility: CLINIC | Age: 48
End: 2023-02-02
Payer: COMMERCIAL

## 2023-02-02 VITALS
HEART RATE: 77 BPM | SYSTOLIC BLOOD PRESSURE: 139 MMHG | RESPIRATION RATE: 20 BRPM | BODY MASS INDEX: 22.13 KG/M2 | HEIGHT: 68 IN | DIASTOLIC BLOOD PRESSURE: 92 MMHG | OXYGEN SATURATION: 100 % | WEIGHT: 146 LBS | TEMPERATURE: 98.3 F

## 2023-02-02 DIAGNOSIS — I10 HYPERTENSION, UNSPECIFIED TYPE: Primary | ICD-10-CM

## 2023-02-02 PROCEDURE — 99207 PR DROP WITH A PROCEDURE: CPT | Performed by: PHYSICIAN ASSISTANT

## 2023-02-02 ASSESSMENT — PAIN SCALES - GENERAL: PAINLEVEL: NO PAIN (0)

## 2023-02-03 ENCOUNTER — VIRTUAL VISIT (OUTPATIENT)
Dept: FAMILY MEDICINE | Facility: CLINIC | Age: 48
End: 2023-02-03
Payer: COMMERCIAL

## 2023-02-03 DIAGNOSIS — L50.8 RECURRENT URTICARIA: ICD-10-CM

## 2023-02-03 DIAGNOSIS — I10 BENIGN ESSENTIAL HYPERTENSION: Primary | ICD-10-CM

## 2023-02-03 DIAGNOSIS — R44.8 FEELS COLD: ICD-10-CM

## 2023-02-03 DIAGNOSIS — F90.9 ATTENTION DEFICIT HYPERACTIVITY DISORDER (ADHD), UNSPECIFIED ADHD TYPE: ICD-10-CM

## 2023-02-03 DIAGNOSIS — Z82.49 FAMILY HISTORY OF CARDIOVASCULAR DISEASE: ICD-10-CM

## 2023-02-03 DIAGNOSIS — N95.1 PERIMENOPAUSAL: ICD-10-CM

## 2023-02-03 PROCEDURE — 99215 OFFICE O/P EST HI 40 MIN: CPT | Mod: 95 | Performed by: PHYSICIAN ASSISTANT

## 2023-02-03 RX ORDER — LISINOPRIL 10 MG/1
10 TABLET ORAL DAILY
Qty: 60 TABLET | Refills: 0 | Status: SHIPPED | OUTPATIENT
Start: 2023-02-03 | End: 2023-04-18

## 2023-02-03 RX ORDER — CETIRIZINE HYDROCHLORIDE 10 MG/1
10 TABLET ORAL DAILY
COMMUNITY
Start: 2023-02-03

## 2023-02-03 NOTE — PROGRESS NOTES
Olesya is a 47 year old who is being evaluated via a billable video visit.      How would you like to obtain your AVS? MyChart  If the video visit is dropped, the invitation should be resent by: using [facility]     Will anyone else be joining your video visit? No    Assessment & Plan     Benign essential hypertension  Family history of cardiovascular disease  New diagnosis, start med.  Would still recommend physical exam as discussed with patient.  Will do labs in a few weeks.  - TSH with free T4 reflex  - UA with Microscopic - lab collect  - lisinopril (ZESTRIL) 10 MG tablet  Dispense: 60 tablet; Refill: 0  - Adult Dermatology Referral  - CBC with platelets  - Comprehensive metabolic panel (BMP + Alb, Alk Phos, ALT, AST, Total. Bili, TP)    Recurrent urticaria  See after visit summary for plan    Feels cold  Check thyroid  -TSH    Attention deficit hyperactivity disorder (ADHD), unspecified ADHD type  Stable, ok to continue medication which has not changed    Perimenopausal  Discussed her concern of estrogen and HTN, however despite discussion of typical change to progesterone only birth control at her age she would like to defer 1 more year.    I spent a total of 42 minutes on the day of the visit.   Time spent doing chart review, history and exam, documentation and further activities per the note     Patient Instructions   HTN -  Start med  Lab appt in a few weeks - set this up  Update me on BP and any side effects in a few weeks  Physical exam for high BP work up when you're in clinic next    Hives -  Zyrtec twice daily  Pepcid (famotidine) 10-40 mg per day  Derm will do labs, but we can treat further meanwhile if needed    Derm follow up on the precancer mole that has returned    Eyes -  Rewetting drops, gel or ointment   Thermalon Dry Eye Moist Heat Compress   See eye doctor      No follow-ups on file.    Racheal Santa PA-C  [facility]    Subjective   Olesya is a 47 year old,  presenting for the following health issues:  Hypertension      HPI     Reason for visit:  High blood pressure  Symptom onset:  More than a month  Symptoms include:  High blood pressure vision  Symptom intensity:  Mild  Symptom progression:  Staying the same  Had these symptoms before:  Yes  Has tried/received treatment for these symptoms:  No  What makes it worse:  Na  What makes it better:  Na    140-145s/90.  Started taking garliq, beet juice.    Exercises.  Fast walk 30-45 min daily.  Eats well - fruits, veggies, balanced protein.  Occasional sweet.  Rare alcohol - maybe on weekends in winter - 3 beers twice a week.  1 cup coffee in morning, 1 pop in afternoon.  Rotates among various supplements which are on med list.  Occasional panic attack feelings, thinks related to caffeine.  No SOB.  Vision not great recently - sometimes good sometimes bad, blurry vision looking at computer but fine up close.  Did have lasix 2 yrs ago monovision, was told she   On adderall for her ADHD - feels very helpful for her job.  Asthma.  Controlled.  Hives if gets out of warm bath or stressed.  Very problematic lately.    Father quintuple bypass at age 55.  Mom has HTN.  Brother age 43 just got put on BP meds.  Mom with thyroid issues.  Freq strep as child, sometimes high fevers which resulted in hospitalizations, bronchitis and pneumonia.            Objective             Physical Exam           Video-Visit Details    Type of service:  Video Visit     Originating Location (pt. Location): Other workplace  Distant Location (provider location):  On-site  Platform used for Video Visit: Noni

## 2023-02-03 NOTE — PATIENT INSTRUCTIONS
HTN -  Start med  Lab appt in a few weeks - set this up  Update me on BP and any side effects in a few weeks  Physical exam for high BP work up when you're in clinic next    Hives -  Zyrtec twice daily  Pepcid (famotidine) 10-40 mg per day  Derm will do labs, but we can treat further meanwhile if needed    Derm follow up on the precancer mole that has returned    Eyes -  Rewetting drops, gel or ointment   Thermalon Dry Eye Moist Heat Compress   See eye doctor

## 2023-02-15 ENCOUNTER — LAB (OUTPATIENT)
Dept: LAB | Facility: CLINIC | Age: 48
End: 2023-02-15
Payer: COMMERCIAL

## 2023-02-15 DIAGNOSIS — Z13.1 SCREENING FOR DIABETES MELLITUS: ICD-10-CM

## 2023-02-15 DIAGNOSIS — I10 BENIGN ESSENTIAL HYPERTENSION: ICD-10-CM

## 2023-02-15 LAB
ALBUMIN SERPL BCG-MCNC: 4.1 G/DL (ref 3.5–5.2)
ALBUMIN UR-MCNC: NEGATIVE MG/DL
ALP SERPL-CCNC: 100 U/L (ref 35–104)
ALT SERPL W P-5'-P-CCNC: 19 U/L (ref 10–35)
ANION GAP SERPL CALCULATED.3IONS-SCNC: 10 MMOL/L (ref 7–15)
APPEARANCE UR: ABNORMAL
AST SERPL W P-5'-P-CCNC: 24 U/L (ref 10–35)
BACTERIA #/AREA URNS HPF: ABNORMAL /HPF
BILIRUB SERPL-MCNC: <0.2 MG/DL
BILIRUB UR QL STRIP: NEGATIVE
BUN SERPL-MCNC: 11.7 MG/DL (ref 6–20)
CALCIUM SERPL-MCNC: 9.3 MG/DL (ref 8.6–10)
CHLORIDE SERPL-SCNC: 98 MMOL/L (ref 98–107)
COLOR UR AUTO: YELLOW
CREAT SERPL-MCNC: 0.75 MG/DL (ref 0.51–0.95)
DEPRECATED HCO3 PLAS-SCNC: 27 MMOL/L (ref 22–29)
ERYTHROCYTE [DISTWIDTH] IN BLOOD BY AUTOMATED COUNT: 13 % (ref 10–15)
GFR SERPL CREATININE-BSD FRML MDRD: >90 ML/MIN/1.73M2
GLUCOSE SERPL-MCNC: 156 MG/DL (ref 70–99)
GLUCOSE UR STRIP-MCNC: NEGATIVE MG/DL
HCT VFR BLD AUTO: 38.3 % (ref 35–47)
HGB BLD-MCNC: 12.2 G/DL (ref 11.7–15.7)
HGB UR QL STRIP: ABNORMAL
KETONES UR STRIP-MCNC: NEGATIVE MG/DL
LEUKOCYTE ESTERASE UR QL STRIP: ABNORMAL
MCH RBC QN AUTO: 29.8 PG (ref 26.5–33)
MCHC RBC AUTO-ENTMCNC: 31.9 G/DL (ref 31.5–36.5)
MCV RBC AUTO: 94 FL (ref 78–100)
MUCOUS THREADS #/AREA URNS LPF: PRESENT /LPF
NITRATE UR QL: POSITIVE
PH UR STRIP: 6.5 [PH] (ref 5–7)
PLATELET # BLD AUTO: 316 10E3/UL (ref 150–450)
POTASSIUM SERPL-SCNC: 3.7 MMOL/L (ref 3.4–5.3)
PROT SERPL-MCNC: 8 G/DL (ref 6.4–8.3)
RBC # BLD AUTO: 4.09 10E6/UL (ref 3.8–5.2)
RBC #/AREA URNS AUTO: ABNORMAL /HPF
SODIUM SERPL-SCNC: 135 MMOL/L (ref 136–145)
SP GR UR STRIP: 1.01 (ref 1–1.03)
SQUAMOUS #/AREA URNS AUTO: ABNORMAL /LPF
TSH SERPL DL<=0.005 MIU/L-ACNC: 1.01 UIU/ML (ref 0.3–4.2)
UROBILINOGEN UR STRIP-ACNC: 0.2 E.U./DL
WBC # BLD AUTO: 8.8 10E3/UL (ref 4–11)
WBC #/AREA URNS AUTO: ABNORMAL /HPF
WBC CLUMPS #/AREA URNS HPF: PRESENT /HPF

## 2023-02-15 PROCEDURE — 83036 HEMOGLOBIN GLYCOSYLATED A1C: CPT

## 2023-02-15 PROCEDURE — 80053 COMPREHEN METABOLIC PANEL: CPT

## 2023-02-15 PROCEDURE — 81001 URINALYSIS AUTO W/SCOPE: CPT

## 2023-02-15 PROCEDURE — 84443 ASSAY THYROID STIM HORMONE: CPT

## 2023-02-15 PROCEDURE — 36415 COLL VENOUS BLD VENIPUNCTURE: CPT

## 2023-02-15 PROCEDURE — 85027 COMPLETE CBC AUTOMATED: CPT

## 2023-02-16 ENCOUNTER — MYC MEDICAL ADVICE (OUTPATIENT)
Dept: FAMILY MEDICINE | Facility: CLINIC | Age: 48
End: 2023-02-16
Payer: COMMERCIAL

## 2023-02-17 DIAGNOSIS — N30.01 ACUTE CYSTITIS WITH HEMATURIA: Primary | ICD-10-CM

## 2023-02-17 DIAGNOSIS — Z13.1 SCREENING FOR DIABETES MELLITUS: Primary | ICD-10-CM

## 2023-02-17 LAB — HBA1C MFR BLD: 5.2 % (ref 0–5.6)

## 2023-02-17 RX ORDER — CEPHALEXIN 500 MG/1
500 CAPSULE ORAL 2 TIMES DAILY
Qty: 14 CAPSULE | Refills: 0 | Status: SHIPPED | OUTPATIENT
Start: 2023-02-17 | End: 2023-02-24

## 2023-02-20 DIAGNOSIS — I10 HTN (HYPERTENSION): Primary | ICD-10-CM

## 2023-02-20 DIAGNOSIS — R31.9 HEMATURIA: ICD-10-CM

## 2023-02-20 NOTE — RESULT ENCOUNTER NOTE
Careteam please order future UA micro, diagnosis HTN, hematuria.    Olesya  Sodium is fine. It was the same previously.  A1c test was normal.  Blood sugar of 150s probably reflected recent meal.  Repeat urine sometime.  Again space it over a week after period ends.  I want to be sure the blood in urine resolves.  Otherwise no changes needed.  Let me know if you have questions.  Racheal

## 2023-04-09 ENCOUNTER — HEALTH MAINTENANCE LETTER (OUTPATIENT)
Age: 48
End: 2023-04-09

## 2023-04-16 DIAGNOSIS — I10 BENIGN ESSENTIAL HYPERTENSION: ICD-10-CM

## 2023-04-18 RX ORDER — LISINOPRIL 10 MG/1
TABLET ORAL
Qty: 60 TABLET | Refills: 0 | Status: SHIPPED | OUTPATIENT
Start: 2023-04-18 | End: 2023-05-26

## 2023-05-25 DIAGNOSIS — Z30.41 ENCOUNTER FOR SURVEILLANCE OF CONTRACEPTIVE PILLS: ICD-10-CM

## 2023-05-25 DIAGNOSIS — I10 BENIGN ESSENTIAL HYPERTENSION: ICD-10-CM

## 2023-05-25 NOTE — TELEPHONE ENCOUNTER
Thank you,  Jacquelyn Tabares - Pharmacy Technician  [facility]  [phone]

## 2023-05-26 RX ORDER — LISINOPRIL 10 MG/1
TABLET ORAL
Qty: 60 TABLET | Refills: 0 | Status: SHIPPED | OUTPATIENT
Start: 2023-05-26 | End: 2023-08-29

## 2023-05-26 NOTE — TELEPHONE ENCOUNTER
Routing refill request to provider for review/approval because:  Latanya given x1 and patient did not follow up, please advise  NESTOR Tabares RN

## 2023-05-31 RX ORDER — NORGESTIMATE AND ETHINYL ESTRADIOL 0.25-0.035
1 KIT ORAL DAILY
Qty: 84 TABLET | Refills: 0 | Status: SHIPPED | OUTPATIENT
Start: 2023-05-31 | End: 2023-09-08

## 2023-05-31 NOTE — TELEPHONE ENCOUNTER
Pending Prescriptions:                       Disp   Refills    norgestimate-ethinyl estradiol (ORTHO-CYCL*84 tab*0        Sig: Take 1 tablet by mouth daily    Routing refill request to provider for review/approval because:  Patient needs to be seen because:  Last visit in February was virtual, will be due for in clinic visit next month. OK for 90 day harpreet refill?    Contraceptives Protocol Failed 05/30/2023 05:16 PM   Protocol Details    Recent (12 mo) or future (30 days) visit within the authorizing provider's specialty    Patient is not a current smoker if age is 35 or older    Medication is active on med list    No active pregnancy on record    No positive pregnancy test in past 12 months     Isaura Guajardo RN  [facility]

## 2023-06-05 ENCOUNTER — OFFICE VISIT (OUTPATIENT)
Dept: DERMATOLOGY | Facility: CLINIC | Age: 48
End: 2023-06-05
Attending: PHYSICIAN ASSISTANT
Payer: COMMERCIAL

## 2023-06-05 DIAGNOSIS — D18.01 ANGIOMA OF SKIN: ICD-10-CM

## 2023-06-05 DIAGNOSIS — L82.1 SEBORRHEIC KERATOSIS: ICD-10-CM

## 2023-06-05 DIAGNOSIS — L81.4 LENTIGO: ICD-10-CM

## 2023-06-05 DIAGNOSIS — D22.9 NEVUS: Primary | ICD-10-CM

## 2023-06-05 PROCEDURE — 99203 OFFICE O/P NEW LOW 30 MIN: CPT | Performed by: PHYSICIAN ASSISTANT

## 2023-06-05 ASSESSMENT — PAIN SCALES - GENERAL: PAINLEVEL: NO PAIN (0)

## 2023-06-05 NOTE — PROGRESS NOTES
HPI:   Chief complaints: Olesya Cintron is a pleasant 48 year old female who presents for Full skin cancer screening to rule out skin cancer   Last Skin Exam: 20+ years ago      1st Baseline: no  Personal HX of Skin Cancer: no   Personal HX of Malignant Melanoma: no   Family HX of Skin Cancer / Malignant Melanoma: yes father with a history of skin cancer  Personal HX of Atypical Moles:   Moles removed in her 20s which were precancerous  Risk factors: history of sun exposure and burns  New / Changing lesions:yes mole on the top of the left ear - is same location where a mole was excised previously.   Social History: is a . 2 grandchildren ages 5 and 3  On review of systems, there are no further skin complaints, patient is feeling otherwise well.   ROS of the following were done and are negative: Constitutional, Eyes, Ears, Nose,   Mouth, Throat, Cardiovascular, Respiratory, GI, Genitourinary, Musculoskeletal,   Psychiatric, Endocrine, Allergic/Immunologic.    PHYSICAL EXAM:   There were no vitals taken for this visit.  Skin exam performed as follows: Type 2 skin. Mood appropriate  Alert and Oriented X 3. Well developed, well nourished in no distress.  General appearance: Normal  Head including face: Normal  Eyes: conjunctiva and lids: Normal  Mouth: Lips, teeth, gums: Normal  Neck: Normal  Chest-breast/axillae: Normal  Back: Normal  Extremities: digits/nails (clubbing): Normal  Eccrine and Apocrine glands: Normal  Right upper extremity: Normal  Left upper extremity: Normal  Right lower extremity: Normal  Left lower extremity: Normal  Skin: Scalp and body hair: See below    Pt deferred exam of breasts, groin, buttocks: No    Other physical findings:  1. Multiple pigmented macules on extremities and trunk  2. Multiple pigmented macules on face, trunk and extremities  3. Multiple vascular papules on trunk, arms and legs  4. Multiple scattered keratotic plaques  5. 4 x 2 mm brown macule on the left  superior helix       Except as noted above, no other signs of skin cancer or melanoma.     ASSESSMENT/PLAN:   Benign Full skin cancer screening today. . Patient with history of none  Advised on monthly self exams and 1 year  Patient Education: Appropriate brochures given.    1. Multiple benign appearing melanocytic nevi on arms, legs and trunk. Discussed ABCDEs of melanoma and sunscreen.   2. Multiple lentigos on arms, legs and trunk. Advised benign, no treatment needed.  3. Multiple scattered angiomas. Advised benign, no treatment needed.   4. Seborrheic keratosis on arms, legs and trunk. Advised benign, no treatment needed.  5. Benign appearing nevus on the left superior helix - will monitor for stability. Photograph taken for reference.           Follow-up: 6 mo for recheck mole/yearly FSE/PRN sooner    1.) Patient was asked about new and changing moles. YES  2.) Patient received a complete physical skin examination: YES  3.) Patient was counseled to perform a monthly self skin examination: YES  Scribed By: Yue Moe MS, PAPEPE

## 2023-06-05 NOTE — LETTER
6/5/2023         RE: Olesya Cintron  [address]  [place] [address]        Dear Colleague,    Thank you for referring your patient, Olesya Cintron, to the [facility]. Please see a copy of my visit note below.    HPI:   Chief complaints: Olesya Cintron is a pleasant 48 year old female who presents for Full skin cancer screening to rule out skin cancer   Last Skin Exam: 20+ years ago      1st Baseline: no  Personal HX of Skin Cancer: no   Personal HX of Malignant Melanoma: no   Family HX of Skin Cancer / Malignant Melanoma: yes father with a history of skin cancer  Personal HX of Atypical Moles:   Moles removed in her 20s which were precancerous  Risk factors: history of sun exposure and burns  New / Changing lesions:yes mole on the top of the left ear - is same location where a mole was excised previously.   Social History: is a . 2 grandchildren ages 5 and 3  On review of systems, there are no further skin complaints, patient is feeling otherwise well.   ROS of the following were done and are negative: Constitutional, Eyes, Ears, Nose,   Mouth, Throat, Cardiovascular, Respiratory, GI, Genitourinary, Musculoskeletal,   Psychiatric, Endocrine, Allergic/Immunologic.    PHYSICAL EXAM:   There were no vitals taken for this visit.  Skin exam performed as follows: Type 2 skin. Mood appropriate  Alert and Oriented X 3. Well developed, well nourished in no distress.  General appearance: Normal  Head including face: Normal  Eyes: conjunctiva and lids: Normal  Mouth: Lips, teeth, gums: Normal  Neck: Normal  Chest-breast/axillae: Normal  Back: Normal  Extremities: digits/nails (clubbing): Normal  Eccrine and Apocrine glands: Normal  Right upper extremity: Normal  Left upper extremity: Normal  Right lower extremity: Normal  Left lower extremity: Normal  Skin: Scalp and body hair: See below    Pt deferred exam of breasts, groin, buttocks: No    Other physical findings:  1. Multiple  pigmented macules on extremities and trunk  2. Multiple pigmented macules on face, trunk and extremities  3. Multiple vascular papules on trunk, arms and legs  4. Multiple scattered keratotic plaques  5. 4 x 2 mm brown macule on the left superior helix       Except as noted above, no other signs of skin cancer or melanoma.     ASSESSMENT/PLAN:   Benign Full skin cancer screening today. . Patient with history of none  Advised on monthly self exams and 1 year  Patient Education: Appropriate brochures given.    1. Multiple benign appearing melanocytic nevi on arms, legs and trunk. Discussed ABCDEs of melanoma and sunscreen.   2. Multiple lentigos on arms, legs and trunk. Advised benign, no treatment needed.  3. Multiple scattered angiomas. Advised benign, no treatment needed.   4. Seborrheic keratosis on arms, legs and trunk. Advised benign, no treatment needed.  5. Benign appearing nevus on the left superior helix - will monitor for stability. Photograph taken for reference.           Follow-up: 6 mo for recheck mole/yearly FSE/PRN sooner    1.) Patient was asked about new and changing moles. YES  2.) Patient received a complete physical skin examination: YES  3.) Patient was counseled to perform a monthly self skin examination: YES  Scribed By: Yue Moe, MS, PAPEPE          Again, thank you for allowing me to participate in the care of your patient.        Sincerely,        Yue Moe PA-C

## 2023-08-29 DIAGNOSIS — I10 BENIGN ESSENTIAL HYPERTENSION: ICD-10-CM

## 2023-08-29 RX ORDER — LISINOPRIL 10 MG/1
TABLET ORAL
Qty: 30 TABLET | Refills: 0 | Status: SHIPPED | OUTPATIENT
Start: 2023-08-29 | End: 2023-10-23

## 2023-09-08 ENCOUNTER — MYC REFILL (OUTPATIENT)
Dept: FAMILY MEDICINE | Facility: CLINIC | Age: 48
End: 2023-09-08
Payer: COMMERCIAL

## 2023-09-08 DIAGNOSIS — Z30.41 ENCOUNTER FOR SURVEILLANCE OF CONTRACEPTIVE PILLS: ICD-10-CM

## 2023-09-08 RX ORDER — NORGESTIMATE AND ETHINYL ESTRADIOL 0.25-0.035
1 KIT ORAL DAILY
Qty: 84 TABLET | Refills: 0 | OUTPATIENT
Start: 2023-09-08

## 2023-09-08 RX ORDER — NORGESTIMATE AND ETHINYL ESTRADIOL 0.25-0.035
1 KIT ORAL DAILY
Qty: 84 TABLET | Refills: 1 | Status: SHIPPED | OUTPATIENT
Start: 2023-09-08 | End: 2023-11-16

## 2023-10-04 DIAGNOSIS — I10 BENIGN ESSENTIAL HYPERTENSION: ICD-10-CM

## 2023-10-05 RX ORDER — LISINOPRIL 10 MG/1
TABLET ORAL
Qty: 30 TABLET | Refills: 0 | Status: CANCELLED | OUTPATIENT
Start: 2023-10-05

## 2023-10-05 RX ORDER — LISINOPRIL 10 MG/1
TABLET ORAL
Qty: 30 TABLET | Refills: 0 | OUTPATIENT
Start: 2023-10-05

## 2023-10-05 NOTE — TELEPHONE ENCOUNTER
Overdue for needed care. Please call to schedule blood pressure check with RN. Once appt is scheduled, route back to the pool.    Julie Behrendt RN

## 2023-10-20 DIAGNOSIS — I10 BENIGN ESSENTIAL HYPERTENSION: ICD-10-CM

## 2023-10-23 RX ORDER — LISINOPRIL 10 MG/1
10-20 TABLET ORAL DAILY
Qty: 30 TABLET | Refills: 0 | Status: SHIPPED | OUTPATIENT
Start: 2023-10-23 | End: 2023-11-16

## 2023-10-23 NOTE — TELEPHONE ENCOUNTER
"Requested Prescriptions   Pending Prescriptions Disp Refills    lisinopril (ZESTRIL) 10 MG tablet [Pharmacy Med Name: LISINOPRIL 10MG TABS] 30 tablet 0     Sig: TAKE ONE TABLET (10 MG) BY MOUTH DAILY. IF NEEDED CAN INCREASE TO 2 TABLETS.  GOAL FOR BLOOD PRESSURE UNDER 130/80.  **NEED FOR BLOOD PRESSURE CHECK WITH NURSE FOR FURTHER REFILLS**       ACE Inhibitors (Including Combos) Protocol Failed - 10/20/2023  5:13 PM        Failed - Blood pressure under 140/90 in past 12 months     BP Readings from Last 3 Encounters:   02/02/23 (!) 139/92   11/17/22 124/84   11/04/22 112/72                 Passed - Recent (12 mo) or future (30 days) visit within the authorizing provider's specialty     Patient has had an office visit with the authorizing provider or a provider within the authorizing providers department within the previous 12 mos or has a future within next 30 days. See \"Patient Info\" tab in inbasket, or \"Choose Columns\" in Meds & Orders section of the refill encounter.              Passed - Medication is active on med list        Passed - Patient is age 18 or older        Passed - No active pregnancy on record        Passed - Normal serum creatinine on file in past 12 months     Recent Labs   Lab Test 02/15/23  1620   CR 0.75       Ok to refill medication if creatinine is low          Passed - Normal serum potassium on file in past 12 months     Recent Labs   Lab Test 02/15/23  1620   POTASSIUM 3.7             Passed - No positive pregnancy test within past 12 months             "

## 2023-11-16 ENCOUNTER — OFFICE VISIT (OUTPATIENT)
Dept: FAMILY MEDICINE | Facility: CLINIC | Age: 48
End: 2023-11-16
Payer: COMMERCIAL

## 2023-11-16 ENCOUNTER — LAB (OUTPATIENT)
Dept: LAB | Facility: CLINIC | Age: 48
End: 2023-11-16
Payer: COMMERCIAL

## 2023-11-16 VITALS
RESPIRATION RATE: 16 BRPM | TEMPERATURE: 98.7 F | BODY MASS INDEX: 22.91 KG/M2 | HEIGHT: 67 IN | OXYGEN SATURATION: 98 % | WEIGHT: 146 LBS | SYSTOLIC BLOOD PRESSURE: 124 MMHG | HEART RATE: 91 BPM | DIASTOLIC BLOOD PRESSURE: 76 MMHG

## 2023-11-16 DIAGNOSIS — Z13.220 SCREENING FOR HYPERLIPIDEMIA: ICD-10-CM

## 2023-11-16 DIAGNOSIS — Z12.11 SCREEN FOR COLON CANCER: ICD-10-CM

## 2023-11-16 DIAGNOSIS — Z30.41 ENCOUNTER FOR SURVEILLANCE OF CONTRACEPTIVE PILLS: ICD-10-CM

## 2023-11-16 DIAGNOSIS — I10 BENIGN ESSENTIAL HYPERTENSION: ICD-10-CM

## 2023-11-16 DIAGNOSIS — Z12.31 VISIT FOR SCREENING MAMMOGRAM: ICD-10-CM

## 2023-11-16 DIAGNOSIS — J01.10 ACUTE NON-RECURRENT FRONTAL SINUSITIS: ICD-10-CM

## 2023-11-16 DIAGNOSIS — L50.2 URTICARIA DUE TO COLD: ICD-10-CM

## 2023-11-16 DIAGNOSIS — Z12.4 CERVICAL CANCER SCREENING: ICD-10-CM

## 2023-11-16 DIAGNOSIS — M77.12 LEFT LATERAL EPICONDYLITIS: Primary | Chronic | ICD-10-CM

## 2023-11-16 LAB
ANION GAP SERPL CALCULATED.3IONS-SCNC: 10 MMOL/L (ref 7–15)
BUN SERPL-MCNC: 14.8 MG/DL (ref 6–20)
CALCIUM SERPL-MCNC: 9.7 MG/DL (ref 8.6–10)
CHLORIDE SERPL-SCNC: 99 MMOL/L (ref 98–107)
CHOLEST SERPL-MCNC: 207 MG/DL
CREAT SERPL-MCNC: 0.87 MG/DL (ref 0.51–0.95)
DEPRECATED HCO3 PLAS-SCNC: 29 MMOL/L (ref 22–29)
EGFRCR SERPLBLD CKD-EPI 2021: 81 ML/MIN/1.73M2
GLUCOSE SERPL-MCNC: 96 MG/DL (ref 70–99)
HDLC SERPL-MCNC: 65 MG/DL
LDLC SERPL CALC-MCNC: 107 MG/DL
NONHDLC SERPL-MCNC: 142 MG/DL
POTASSIUM SERPL-SCNC: 4 MMOL/L (ref 3.4–5.3)
SODIUM SERPL-SCNC: 138 MMOL/L (ref 135–145)
TRIGL SERPL-MCNC: 175 MG/DL

## 2023-11-16 PROCEDURE — 90471 IMMUNIZATION ADMIN: CPT | Performed by: NURSE PRACTITIONER

## 2023-11-16 PROCEDURE — G0145 SCR C/V CYTO,THINLAYER,RESCR: HCPCS | Performed by: NURSE PRACTITIONER

## 2023-11-16 PROCEDURE — 80061 LIPID PANEL: CPT

## 2023-11-16 PROCEDURE — 36415 COLL VENOUS BLD VENIPUNCTURE: CPT

## 2023-11-16 PROCEDURE — 87624 HPV HI-RISK TYP POOLED RSLT: CPT | Performed by: NURSE PRACTITIONER

## 2023-11-16 PROCEDURE — 80048 BASIC METABOLIC PNL TOTAL CA: CPT

## 2023-11-16 PROCEDURE — 99214 OFFICE O/P EST MOD 30 MIN: CPT | Mod: 25 | Performed by: NURSE PRACTITIONER

## 2023-11-16 PROCEDURE — 90715 TDAP VACCINE 7 YRS/> IM: CPT | Performed by: NURSE PRACTITIONER

## 2023-11-16 RX ORDER — DOXYCYCLINE 100 MG/1
100 CAPSULE ORAL 2 TIMES DAILY
Qty: 14 CAPSULE | Refills: 0 | Status: SHIPPED | OUTPATIENT
Start: 2023-11-16 | End: 2023-11-16

## 2023-11-16 RX ORDER — LISINOPRIL 10 MG/1
10-20 TABLET ORAL DAILY
Qty: 90 TABLET | Refills: 3 | Status: SHIPPED | OUTPATIENT
Start: 2023-11-16

## 2023-11-16 RX ORDER — NORGESTIMATE AND ETHINYL ESTRADIOL 0.25-0.035
1 KIT ORAL DAILY
Qty: 84 TABLET | Refills: 3 | Status: SHIPPED | OUTPATIENT
Start: 2023-11-16

## 2023-11-16 RX ORDER — DOXYCYCLINE 100 MG/1
100 CAPSULE ORAL 2 TIMES DAILY
Qty: 14 CAPSULE | Refills: 0 | Status: SHIPPED | OUTPATIENT
Start: 2023-11-16

## 2023-11-16 ASSESSMENT — PAIN SCALES - GENERAL: PAINLEVEL: NO PAIN (0)

## 2023-11-16 NOTE — PROGRESS NOTES
Assessment & Plan     Left lateral epicondylitis  Symptoms present since July leading to now chronic epicondylitis present on the left elbow.  Rest, bracing, and ice have been unsuccessful at relieving the symptoms.  Advised starting with therapy if no improvement to sports medicine referral to be placed.  - Occupational Therapy Referral; Future  - Physical Therapy Referral; Future    Benign essential hypertension  Blood pressure is well controlled on current regimen refills provided today Labs updated.  - lisinopril (ZESTRIL) 10 MG tablet; Take 1-2 tablets (10-20 mg) by mouth daily  - Basic metabolic panel  (Ca, Cl, CO2, Creat, Gluc, K, Na, BUN); Future    Acute non-recurrent frontal sinusitis  Acute sinusitis present.  Will treat with doxycycline given history of penicillin allergy.  Advised symptomatic management utilizing Mucinex Tylenol, ibuprofen, and decongestant if needed.  Follow-up if no improvement.  Symptoms have been present for greater than 10 days.  - doxycycline hyclate (VIBRAMYCIN) 100 MG capsule; Take 1 capsule (100 mg) by mouth 2 times daily    Screening for hyperlipidemia  - Lipid panel reflex to direct LDL Non-fasting; Future    Screen for colon cancer  - Colonoscopy Screening  Referral; Future    Visit for screening mammogram  - MA SCREENING DIGITAL BILAT - Future  (s+30); Future    Cervical cancer screening  - Pap Screen with HPV - recommended age 30 - 65 years    Encounter for surveillance of contraceptive pills  - norgestimate-ethinyl estradiol (ORTHO-CYCLEN) 0.25-35 MG-MCG tablet; Take 1 tablet by mouth daily    Urticaria due to cold  Patient's experience of rash consistent with an urticarial type reaction.  Advised doing her best to avoid triggering components thus for her seems to be cold exposure.  Warm layered clothing will help reduce symptoms.                 FANI Cates [facility]    Elgin Dacosta is a 48 year old, presenting  for the following health issues:  Hypertension, Sinus Problem, and Elbow Pain        11/16/2023     2:24 PM   Additional Questions   Roomed by Jazmín WEBBER MA   Accompanied by Self       History of Present Illness       Hypertension: She presents for follow up of hypertension.  She does check blood pressure  regularly outside of the clinic. Outpatient blood pressures have not been over 140/90. She does not follow a low salt diet.     Reason for visit:  Tennis elbow high blood pressure rash sinus infection    She eats 2-3 servings of fruits and vegetables daily.She consumes 1 sweetened beverage(s) daily.She exercises with enough effort to increase her heart rate 20 to 29 minutes per day.  She exercises with enough effort to increase her heart rate 4 days per week.   She is taking medications regularly.       *Patient hurt left elbow in July-was carrying a really heavy bag and thinks she developed tennis elbow from that. She has used KT taping and wearing a band on the arm. Paresthesias present into hand. Would like to start physical therapy.     Also develops a rash on both forearms, has been going on since Sept. Has seen dermatology. Will appear and then disappear.       Acute Illness  Acute illness concerns: Sinus concerns  Onset/Duration: Started x2 weeks ago  Symptoms:  Fever: No, initially with symptom onset.   Chills/Sweats: No  Headache (location?): YES  Sinus Pressure: YES, mid forehead headache, feels huston on the right right.   Conjunctivitis:  No  Ear Pain: YES: right  Rhinorrhea: YES  Congestion: YES  Sore Throat: YES  Cough: YES-non-productive during the night.   Wheeze: No  Decreased Appetite: No  Nausea: YES  Vomiting: No  Diarrhea: YES  Dysuria/Freq.: No  Dysuria or Hematuria: No  Fatigue/Achiness: No  Sick/Strep Exposure: YES  Therapies tried and outcome: Mucinex, dayquil  She is using her inhaler.   Front headache present with some wheezing in her upper chest when coughing. Sinus pressure has been  "persistent.       Over due for health maintenance she would like to do this today if able. PAP smear included.     Review of Systems   Constitutional, HEENT, cardiovascular, pulmonary, gi and gu systems are negative, except as otherwise noted.      Objective    /76   Pulse 91   Temp 98.7  F (37.1  C) (Tympanic)   Resp 16   Ht 1.702 m (5' 7\")   Wt 66.2 kg (146 lb)   LMP 10/02/2023 (Approximate)   SpO2 98%   BMI 22.87 kg/m    Body mass index is 22.87 kg/m .    Physical Exam   GENERAL: healthy, alert and no distress  EYES: Eyes grossly normal to inspection, PERRL and conjunctivae and sclerae normal  HENT: ear canals and TM's normal, nose and mouth without ulcers or lesions  NECK: no adenopathy, no asymmetry, masses, or scars and thyroid normal to palpation  RESP: lungs clear to auscultation - no rales, rhonchi or wheezes  CV: regular rate and rhythm, normal S1 S2, no S3 or S4, no murmur, click or rub, no peripheral edema and peripheral pulses strong  MS: no gross musculoskeletal defects noted, no edema  SKIN: no suspicious lesions or rashes  NEURO: Normal strength and tone, mentation intact and speech normal  PSYCH: mentation appears normal, affect normal/bright                      "

## 2023-11-16 NOTE — COMMUNITY RESOURCES LIST (ENGLISH)
11/16/2023    [employer]  N/A  For questions about this resource list or additional care needs, please contact your primary care clinic or care manager.  Phone: [phone]   Email: N/A   Address: [address] [place] [id number]   Hours: N/A        Financial Stability       Utility payment assistance  1  [employer] Distance: 8.9 miles      In-Person, Phone/Virtual   [address] [place] [address]  Language: English  Hours: Mon - Sun Appt. Only  Fees: Free   Phone: (466) 740-9484 Email: maria antonia@[employer] Website: http://www.community[employer] Environmentspinghand.org     2  [facility] [place] operated by [facility] [employer] ([employer]) - Energy Assistance Program Distance: 19.71 miles      In-Person, Phone/Virtual   [address] [address]  Language: English  Hours: Mon - Fri 8:00 AM - 4:30 PM  Fees: Free   Phone: (325) 405-3820 Email: accap@accap.org Website: http://www.accap.org          Important Numbers & Websites       Emergency Services   911  [facility]   [facility]  Poison Control   (522) 591-1287  Suicide Prevention Lifeline   (131) 838-6019 (TALK)  Child Abuse Hotline   (771) 247-5367 (4-A-Child)  Sexual Assault Hotline   (133) 237-6283 (HOPE)  National Runaway Safeline   (965) 339-9673 (RUNAWAY)  All-Options Talkline   (750) 164-6183  Substance Abuse Referral   (915) 892-3437 (HELP)

## 2023-11-21 LAB
BKR LAB AP GYN ADEQUACY: NORMAL
BKR LAB AP GYN INTERPRETATION: NORMAL
BKR LAB AP HPV REFLEX: NORMAL
BKR LAB AP LMP: NORMAL
BKR LAB AP PREVIOUS ABNORMAL: NORMAL
PATH REPORT.COMMENTS IMP SPEC: NORMAL
PATH REPORT.COMMENTS IMP SPEC: NORMAL
PATH REPORT.RELEVANT HX SPEC: NORMAL

## 2023-11-24 LAB
HUMAN PAPILLOMA VIRUS 16 DNA: NEGATIVE
HUMAN PAPILLOMA VIRUS 18 DNA: NEGATIVE
HUMAN PAPILLOMA VIRUS FINAL DIAGNOSIS: NORMAL
HUMAN PAPILLOMA VIRUS OTHER HR: NEGATIVE

## 2024-01-27 ENCOUNTER — HEALTH MAINTENANCE LETTER (OUTPATIENT)
Age: 49
End: 2024-01-27

## 2024-03-07 ENCOUNTER — TELEPHONE (OUTPATIENT)
Dept: FAMILY MEDICINE | Facility: CLINIC | Age: 49
End: 2024-03-07
Payer: COMMERCIAL

## 2024-03-07 NOTE — TELEPHONE ENCOUNTER
Test Results        Who ordered the test:  PCP    Type of test: Lab    Date of test:  Last 12 months    Where was the test performed:  [place]    What are your questions/concerns?:  [facility] is looking for lab work results from the last 12 months, please call the number listed below with any question.     Please fax the results to:  [phone] - ATTN: Jeremías Michel    Could we send this information to you in [facility] or would you prefer to receive a phone call?:   Patient would prefer a phone call   Okay to leave a detailed message?: No at Other phone number:  [phone]

## 2024-07-10 DIAGNOSIS — I10 BENIGN ESSENTIAL HYPERTENSION: ICD-10-CM

## 2024-07-11 RX ORDER — LISINOPRIL 10 MG/1
TABLET ORAL
Qty: 90 TABLET | Refills: 2 | OUTPATIENT
Start: 2024-07-11

## 2024-10-14 DIAGNOSIS — Z30.41 ENCOUNTER FOR SURVEILLANCE OF CONTRACEPTIVE PILLS: ICD-10-CM

## 2024-10-15 RX ORDER — NORGESTIMATE AND ETHINYL ESTRADIOL 0.25-0.035
1 KIT ORAL DAILY
Qty: 84 TABLET | Refills: 1 | Status: SHIPPED | OUTPATIENT
Start: 2024-10-15

## 2024-12-22 DIAGNOSIS — I10 BENIGN ESSENTIAL HYPERTENSION: ICD-10-CM

## 2024-12-23 RX ORDER — LISINOPRIL 10 MG/1
TABLET ORAL
Qty: 90 TABLET | Refills: 2 | OUTPATIENT
Start: 2024-12-23

## 2024-12-23 NOTE — TELEPHONE ENCOUNTER
Overdue for needed care. Please call to schedule annual exam.    Isaura Guajardo RN  [facility]

## 2025-01-22 ENCOUNTER — OFFICE VISIT (OUTPATIENT)
Dept: FAMILY MEDICINE | Facility: CLINIC | Age: 50
End: 2025-01-22
Payer: COMMERCIAL

## 2025-01-22 ENCOUNTER — ORDERS ONLY (AUTO-RELEASED) (OUTPATIENT)
Dept: FAMILY MEDICINE | Facility: CLINIC | Age: 50
End: 2025-01-22

## 2025-01-22 VITALS
DIASTOLIC BLOOD PRESSURE: 90 MMHG | RESPIRATION RATE: 16 BRPM | BODY MASS INDEX: 23.52 KG/M2 | OXYGEN SATURATION: 99 % | HEART RATE: 98 BPM | HEIGHT: 68 IN | SYSTOLIC BLOOD PRESSURE: 132 MMHG | TEMPERATURE: 98.8 F | WEIGHT: 155.2 LBS

## 2025-01-22 DIAGNOSIS — Z11.59 NEED FOR HEPATITIS C SCREENING TEST: ICD-10-CM

## 2025-01-22 DIAGNOSIS — L65.9 HAIR THINNING: ICD-10-CM

## 2025-01-22 DIAGNOSIS — N92.4 EXCESSIVE BLEEDING IN PREMENOPAUSAL PERIOD: ICD-10-CM

## 2025-01-22 DIAGNOSIS — Z00.00 ROUTINE HISTORY AND PHYSICAL EXAMINATION OF ADULT: ICD-10-CM

## 2025-01-22 DIAGNOSIS — Z12.31 VISIT FOR SCREENING MAMMOGRAM: ICD-10-CM

## 2025-01-22 DIAGNOSIS — Z12.11 SCREEN FOR COLON CANCER: ICD-10-CM

## 2025-01-22 DIAGNOSIS — J01.00 ACUTE MAXILLARY SINUSITIS, RECURRENCE NOT SPECIFIED: ICD-10-CM

## 2025-01-22 DIAGNOSIS — L98.9 PRECANCEROUS SKIN LESION: ICD-10-CM

## 2025-01-22 DIAGNOSIS — J98.01 BRONCHOSPASM: ICD-10-CM

## 2025-01-22 DIAGNOSIS — G47.00 INSOMNIA, UNSPECIFIED TYPE: ICD-10-CM

## 2025-01-22 DIAGNOSIS — Z02.89 HEALTH EXAMINATION OF DEFINED SUBPOPULATION: ICD-10-CM

## 2025-01-22 DIAGNOSIS — J30.1 ALLERGIC RHINITIS DUE TO POLLEN, UNSPECIFIED SEASONALITY: ICD-10-CM

## 2025-01-22 DIAGNOSIS — F90.9 ATTENTION DEFICIT HYPERACTIVITY DISORDER (ADHD), UNSPECIFIED ADHD TYPE: Primary | ICD-10-CM

## 2025-01-22 DIAGNOSIS — Z87.09 HISTORY OF ASTHMA: ICD-10-CM

## 2025-01-22 DIAGNOSIS — R73.09 ELEVATED GLUCOSE: ICD-10-CM

## 2025-01-22 DIAGNOSIS — Z30.41 ENCOUNTER FOR SURVEILLANCE OF CONTRACEPTIVE PILLS: ICD-10-CM

## 2025-01-22 DIAGNOSIS — I10 BENIGN ESSENTIAL HYPERTENSION: ICD-10-CM

## 2025-01-22 PROBLEM — A69.20 LYME DISEASE: Status: RESOLVED | Noted: 2019-07-15 | Resolved: 2025-01-22

## 2025-01-22 PROCEDURE — 99396 PREV VISIT EST AGE 40-64: CPT | Performed by: PHYSICIAN ASSISTANT

## 2025-01-22 PROCEDURE — G2211 COMPLEX E/M VISIT ADD ON: HCPCS | Performed by: PHYSICIAN ASSISTANT

## 2025-01-22 PROCEDURE — 99213 OFFICE O/P EST LOW 20 MIN: CPT | Mod: 25 | Performed by: PHYSICIAN ASSISTANT

## 2025-01-22 RX ORDER — CETIRIZINE HYDROCHLORIDE 10 MG/1
10 TABLET ORAL DAILY
Qty: 90 TABLET | Refills: 3 | Status: SHIPPED | OUTPATIENT
Start: 2025-01-22

## 2025-01-22 RX ORDER — TRAZODONE HYDROCHLORIDE 50 MG/1
50-75 TABLET, FILM COATED ORAL AT BEDTIME
Qty: 135 TABLET | Refills: 3 | Status: SHIPPED | OUTPATIENT
Start: 2025-01-22

## 2025-01-22 RX ORDER — NORGESTIMATE AND ETHINYL ESTRADIOL 0.25-0.035
1 KIT ORAL DAILY
Qty: 84 TABLET | Refills: 0 | Status: SHIPPED | OUTPATIENT
Start: 2025-01-22

## 2025-01-22 RX ORDER — SPIRONOLACTONE 50 MG/1
50 TABLET, FILM COATED ORAL DAILY
Qty: 30 TABLET | Refills: 0 | Status: SHIPPED | OUTPATIENT
Start: 2025-01-22

## 2025-01-22 RX ORDER — DEXTROAMPHETAMINE SACCHARATE, AMPHETAMINE ASPARTATE, DEXTROAMPHETAMINE SULFATE AND AMPHETAMINE SULFATE 1.25; 1.25; 1.25; 1.25 MG/1; MG/1; MG/1; MG/1
5 TABLET ORAL DAILY PRN
COMMUNITY
Start: 2025-01-22

## 2025-01-22 RX ORDER — ALBUTEROL SULFATE 90 UG/1
2 INHALANT RESPIRATORY (INHALATION) EVERY 6 HOURS PRN
Qty: 18 G | Refills: 0 | Status: SHIPPED | OUTPATIENT
Start: 2025-01-22

## 2025-01-22 RX ORDER — FLUTICASONE PROPIONATE 50 MCG
1 SPRAY, SUSPENSION (ML) NASAL DAILY
Qty: 48 G | Refills: 3 | Status: SHIPPED | OUTPATIENT
Start: 2025-01-22

## 2025-01-22 RX ORDER — ATOMOXETINE 40 MG/1
40 CAPSULE ORAL DAILY
Qty: 57 CAPSULE | Refills: 0 | Status: SHIPPED | OUTPATIENT
Start: 2025-01-22

## 2025-01-22 RX ORDER — LISINOPRIL 10 MG/1
10-20 TABLET ORAL DAILY
Qty: 90 TABLET | Refills: 3 | Status: CANCELLED | OUTPATIENT
Start: 2025-01-22

## 2025-01-22 SDOH — HEALTH STABILITY: PHYSICAL HEALTH: ON AVERAGE, HOW MANY MINUTES DO YOU ENGAGE IN EXERCISE AT THIS LEVEL?: 30 MIN

## 2025-01-22 SDOH — HEALTH STABILITY: PHYSICAL HEALTH: ON AVERAGE, HOW MANY DAYS PER WEEK DO YOU ENGAGE IN MODERATE TO STRENUOUS EXERCISE (LIKE A BRISK WALK)?: 5 DAYS

## 2025-01-22 ASSESSMENT — PAIN SCALES - GENERAL: PAINLEVEL_OUTOF10: NO PAIN (0)

## 2025-01-22 ASSESSMENT — SOCIAL DETERMINANTS OF HEALTH (SDOH): HOW OFTEN DO YOU GET TOGETHER WITH FRIENDS OR RELATIVES?: TWICE A WEEK

## 2025-01-22 NOTE — PROGRESS NOTES
Preventive Care Visit  [facility]  Racheal Santa PA-C, Family Medicine  Jan 22, 2025      Assessment & Plan     Routine physical exam of adult    Attention deficit hyperactivity disorder (ADHD), unspecified ADHD type  Controlled, transition care from psychiatry.  However, decide to try strattera instead of her current adderall XR 20 and IR 5 mg.     - atomoxetine (STRATTERA) 40 MG capsule; Take 1 capsule (40 mg) by mouth daily. X 3 days then increase to 2 caps (80 mg).    Benign essential hypertension  Controlled, but med change to also treat hair thinning  - spironolactone (ALDACTONE) 50 MG tablet; Take 1 tablet (50 mg) by mouth daily.    Hair thinning  Work up and treat newly discussed problem  - CBC with platelets; Future  - TSH with free T4 reflex; Future  - Iron and iron binding capacity; Future  - Ferritin; Future  - Vitamin D Deficiency; Future  - Comprehensive metabolic panel (BMP + Alb, Alk Phos, ALT, AST, Total. Bili, TP); Future  - spironolactone (ALDACTONE) 50 MG tablet; Take 1 tablet (50 mg) by mouth daily.    Insomnia, unspecified type  Refill stable  - traZODone (DESYREL) 50 MG tablet; Take 1-1.5 tablets (50-75 mg) by mouth at bedtime.    Acute maxillary sinusitis, recurrence not specified  Refill stable  - fluticasone (FLONASE) 50 MCG/ACT nasal spray; Spray 1 spray into both nostrils daily.    Allergic rhinitis due to pollen, unspecified seasonality  Refill stable  - cetirizine (ZYRTEC) 10 MG tablet; Take 1 tablet (10 mg) by mouth daily.    Elevated glucose  monitor  - Hemoglobin A1c; Future    Excessive bleeding in premenopausal period  Needs work up  - Ob/Gyn  Referral; Future  - CBC with platelets; Future  - TSH with free T4 reflex; Future    Encounter for surveillance of contraceptive pills  Short refill as needs to see GYN for concerning bleeding pattern  - norgestimate-ethinyl estradiol (ORTHO-CYCLEN) 0.25-35 MG-MCG tablet; Take 1 tablet by mouth  daily. No refill without seeing GYN    history of precancerous skin lesion excisions  Referral back to dermatology  - Adult Dermatology  Referral; Future    Bronchospasm during illness  History of childhood asthma  Refill to have on hand  - albuterol (PROAIR HFA/PROVENTIL HFA/VENTOLIN HFA) 108 (90 Base) MCG/ACT inhaler; Inhale 2 puffs into the lungs every 6 hours as needed for shortness of breath, wheezing or cough.    Visit for screening mammogram  - MA Screening Bilateral w/ [facility]; Future    Screen for colon cancer  - COLOGUARD(eKonnekt); Future    Health examination of defined subpopulation  - Varicella zoster antibody IgM; Future    The longitudinal plan of care for the diagnosis(es)/condition(s) as documented were addressed during this visit. Due to the added complexity in care, I will continue to support Olesya in the subsequent management and with ongoing continuity of care.    Counseling  Appropriate preventive services were addressed with this patient via screening, questionnaire, or discussion as appropriate for fall prevention, nutrition, physical activity, Tobacco-use cessation, social engagement, weight loss and cognition.  Checklist reviewing preventive services available has been given to the patient.  Reviewed patient's diet, addressing concerns and/or questions.     Patient Instructions   Refilled birth control x 3 months - but need to set up with GYN     Stop lisinopril.  Replace with spironolactone.  May want to try half tab daily for a few days but if possible, go up to 50 mg.  BP goal 110s-130/60s-80.  Higher doses help hair more.  Will need lab.    Try strattera.      Set up mammogram - Call [phone] to set up    Colon cancer screen will come in mail    Set up recheck with dermatology   Set up with GYN     Somehow follow up with me in 2-4 weeks and do labs    Discussed vaccines    Subjective   Olesya is a 49 year old, presenting for the following:  Physical, Hypertension (Renew  meds), Contraception (Renew meds), Refill Request (flonase), LAB REQUEST (Would like labs done to check hormone levels to see if she is in pre menopause), Health Maintenance (Reminded due for colonoscopy and mammogram /declined vaccines ), and Medication Question (Patient is wondering if a PCP can prescribe ADHD meds and Trazodone or if Psychiatrist has to keep doing these. )        1/22/2025     2:10 PM   Additional Questions   Roomed by David STOLL CMA   Accompanied by self         1/22/2025     2:10 PM   Patient Reported Additional Medications   Patient reports taking the following new medications Multi Vitamin 1 tablet daily        HPI  Cold x 5 days.  Family was all pos for covid but she has tested neg.  Is improving.    LAB REQUEST Would like labs done to check hormone levels to see if she is in pre menopause     Medication Question Patient is wondering if a PCP can prescribe ADHD meds and Trazodone or if Psychiatrist has to keep doing these.      Hypertension Follow-up  Do you check your blood pressure regularly outside of the clinic? Yes   Are you following a low salt diet? No  Are your blood pressures ever more than 140 on the top number (systolic) OR more   than 90 on the bottom number (diastolic), for example 140/90? Yes    On lisinopril 10 mg.  HTN running 124-125/80.  Higher today but has been taking a lot of cold meds.  Seems to keep BP down with beet juice.    Pulse runs around 98.      ADHD - x yrs unchanged, adderall xr 20 plus  - in past different versions of adderall.  Diagnosed when her child got diagnosed in elementary school -  pointed out same symptoms in herself.  Has been seeing psychiatry for years but wants to transition it to family practice.  Takes 7 days/wk.  No lost/stolen meds.      Hair loss - frontal temporal occipital.  Mom with hypothyroid, alopecia.    Note history of elev glucose.    Medication Followup of Flonsase/Birth Control   Taking Medication as prescribed: yes  Side  Effects:  None  Medication Helping Symptoms:  Birth control - having irregular periods     Used to be bleeding x 4 days, now 7-8 days AND it is heavier.  Spotting between periods.  No new cramping.      Health Care Directive  Patient does not have a Health Care Directive: Discussed advance care planning with patient; however, patient declined at this time.      1/22/2025   General Health   How would you rate your overall physical health? Good   Feel stress (tense, anxious, or unable to sleep) Only a little   (!) STRESS CONCERN      1/22/2025   Nutrition   Three or more servings of calcium each day? Yes   Diet: Regular (no restrictions)   How many servings of fruit and vegetables per day? (!) 2-3   How many sweetened beverages each day? 0-1         1/22/2025   Exercise   Days per week of moderate/strenous exercise 5 days   Average minutes spent exercising at this level 30 min         1/22/2025   Social Factors   Frequency of gathering with friends or relatives Twice a week   Worry food won't last until get money to buy more No   Food not last or not have enough money for food? No   Do you have housing? (Housing is defined as stable permanent housing and does not include staying ouside in a car, in a tent, in an abandoned building, in an overnight shelter, or couch-surfing.) Yes   Are you worried about losing your housing? No   Lack of transportation? No   Unable to get utilities (heat,electricity)? No         1/22/2025   Fall Risk   Fallen 2 or more times in the past year? No   Trouble with walking or balance? No          1/22/2025   Dental   Dentist two times every year? Yes         1/22/2025   TB Screening   Were you born outside of the [place]? Yes         Today's PHQ-2 Score:       1/22/2025     2:10 PM   PHQ-2 ( 1999 [employer])   Q1: Little interest or pleasure in doing things 0   Q2: Feeling down, depressed or hopeless 0   PHQ-2 Score 0    Q1: Little interest or pleasure in doing things Not at all   Q2: Feeling down,  "depressed or hopeless Not at all   PHQ-2 Score 0       Patient-reported           1/22/2025   Substance Use   Alcohol more than 3/day or more than 7/wk No   Do you use any other substances recreationally? No     Social History     Tobacco Use    Smoking status: Former     Types: Cigarettes    Smokeless tobacco: Never    Tobacco comments:     uses nicotene gum on occassion   Vaping Use    Vaping status: Never Used   Substance Use Topics    Alcohol use: Yes     Comment: one drink per week    Drug use: No          Mammogram Screening - Mammogram every 1-2 years updated in Health Maintenance based on mutual decision making        1/22/2025   STI Screening   New sexual partner(s) since last STI/HIV test? No     History of abnormal Pap smear: No - age 30- 64 PAP with HPV every 5 years recommended        Latest Ref Rng & Units 11/16/2023     2:51 PM 6/25/2019     1:30 PM 6/25/2019     1:12 PM   PAP / HPV   PAP  Negative for Intraepithelial Lesion or Malignancy (NILM)      PAP (Historical)    NIL    HPV 16 DNA Negative Negative  Negative     HPV 18 DNA Negative Negative  Negative     Other HR HPV Negative Negative  Negative       ASCVD Risk   The 10-year ASCVD risk score (Ruth MILLER, et al., 2019) is: 1.5%    Values used to calculate the score:      Age: 49 years      Sex: Female      Is Non- : No      Diabetic: No      Tobacco smoker: No      Systolic Blood Pressure: 132 mmHg      Is BP treated: Yes      HDL Cholesterol: 65 mg/dL      Total Cholesterol: 207 mg/dL        1/22/2025   Contraception/Family Planning   Questions about contraception or family planning (!) YES         Reviewed and updated as needed this visit by Provider                    Objective    Exam  /90 (BP Location: Right arm, Patient Position: Sitting, Cuff Size: Adult Regular)   Pulse 98   Temp 98.8  F (37.1  C) (Tympanic)   Resp 16   Ht 1.715 m (5' 7.5\")   Wt 70.4 kg (155 lb 3.2 oz)   LMP 01/06/2025 " "(Approximate)   SpO2 99%   BMI 23.95 kg/m     Estimated body mass index is 23.95 kg/m  as calculated from the following:    Height as of this encounter: 1.715 m (5' 7.5\").    Weight as of this encounter: 70.4 kg (155 lb 3.2 oz).    Physical Exam  GENERAL: alert and no distress  EYES: Eyes grossly normal to inspection, PERRL and conjunctivae and sclerae normal  HENT: ear canals and TM's normal, nose and mouth without ulcers or lesions  NECK: no adenopathy, no asymmetry, masses, or scars  RESP: lungs clear to auscultation - no rales, rhonchi or wheezes  CV: regular rate and rhythm, normal S1 S2, no S3 or S4, no murmur, click or rub, no peripheral edema  ABDOMEN: soft, nontender, no hepatosplenomegaly, no masses and bowel sounds normal  MS: no gross musculoskeletal defects noted, no edema  SKIN: no suspicious lesions or rashes  NEURO: Normal strength and tone, mentation intact and speech normal  PSYCH: mentation appears normal, affect normal/bright        Signed Electronically by: Racheal Santa PA-C    "

## 2025-01-22 NOTE — PATIENT INSTRUCTIONS
Refilled birth control x 3 months - but need to set up with GYN     Stop lisinopril.  Replace with spironolactone.  May want to try half tab daily for a few days but if possible, go up to 50 mg.  BP goal 110s-130/60s-80.  Higher doses help hair more.  Will need lab.    Try strattera.      Set up mammogram - Call [phone] to set up    Colon cancer screen will come in mail    Set up recheck with dermatology   Set up with GYN     Somehow follow up with me in 2-4 weeks and do labs    Discussed vaccines

## 2025-01-23 ENCOUNTER — PATIENT OUTREACH (OUTPATIENT)
Dept: CARE COORDINATION | Facility: CLINIC | Age: 50
End: 2025-01-23
Payer: COMMERCIAL

## 2025-01-24 ENCOUNTER — TELEPHONE (OUTPATIENT)
Dept: FAMILY MEDICINE | Facility: CLINIC | Age: 50
End: 2025-01-24
Payer: COMMERCIAL

## 2025-01-24 NOTE — TELEPHONE ENCOUNTER
Prior Authorization required on Atomoxetine 40mg  Insurance Phone [phone]  Patient ID [id number]  Please contact the pharmacy with Prior Auth status (approved/denied)    Thank you  Leena Hernandez [facility]  [phone]

## 2025-01-28 NOTE — TELEPHONE ENCOUNTER
Central Prior Authorization Team   Phone: [phone]    PA Initiation    Medication: Atomoxetine 40mg  Insurance Company: [employer] - Phone [phone] Fax [phone]  Pharmacy Filling the Rx: [facility] [place], [place] - [address]  Filling Pharmacy Phone: [phone]  Filling Pharmacy Fax:    Start Date: 1/28/2025    Key: [id number]

## 2025-01-29 ENCOUNTER — TELEPHONE (OUTPATIENT)
Dept: OBGYN | Facility: CLINIC | Age: 50
End: 2025-01-29
Payer: COMMERCIAL

## 2025-01-29 NOTE — TELEPHONE ENCOUNTER
Prior Authorization Approval    Authorization Effective Date: 1/29/2025  Authorization Expiration Date: 1/28/2028  Medication: Atomoxetine 40mg  Approved Dose/Quantity:    Reference #:     Insurance Company: [employer] - Phone [phone] Fax [phone]  Expected CoPay:       CoPay Card Available:      Foundation Assistance Needed:    Which Pharmacy is filling the prescription (Not needed for infusion/clinic administered): [facility] [place], [place] - [address] [place] [address]  Pharmacy Notified:  Yes  Patient Notified:  **Instructed pharmacy to notify patient when script is ready to /ship.**

## 2025-01-29 NOTE — TELEPHONE ENCOUNTER
Referral sent as routine next available opening. If patient desires to wait until her mammogram appointment to come on the same day as that , she can make that choice.    Olivia GOMES RN   [facility]

## 2025-01-29 NOTE — TELEPHONE ENCOUNTER
This patient is unable to be accommodated through the escalation process. Call patient to offer affiliate partner option, or can wait to be seen here on 3/11/25 Pt requested to schedule on 3/11/25 DOS for her appt to align with her mammo appt already scheduled. Writer scheduled per pt preference, if she needs to be seen soon please contact pt to advise. Thank you!

## 2025-02-01 ENCOUNTER — HEALTH MAINTENANCE LETTER (OUTPATIENT)
Age: 50
End: 2025-02-01

## 2025-02-11 ENCOUNTER — LAB (OUTPATIENT)
Dept: LAB | Facility: CLINIC | Age: 50
End: 2025-02-11
Payer: COMMERCIAL

## 2025-02-11 DIAGNOSIS — L65.9 HAIR THINNING: ICD-10-CM

## 2025-02-11 DIAGNOSIS — N92.4 EXCESSIVE BLEEDING IN PREMENOPAUSAL PERIOD: ICD-10-CM

## 2025-02-11 DIAGNOSIS — R73.09 ELEVATED GLUCOSE: ICD-10-CM

## 2025-02-11 DIAGNOSIS — Z02.89 HEALTH EXAMINATION OF DEFINED SUBPOPULATION: ICD-10-CM

## 2025-02-11 LAB
ALBUMIN SERPL BCG-MCNC: 4.7 G/DL (ref 3.5–5.2)
ALP SERPL-CCNC: 90 U/L (ref 40–150)
ALT SERPL W P-5'-P-CCNC: 20 U/L (ref 0–50)
ANION GAP SERPL CALCULATED.3IONS-SCNC: 15 MMOL/L (ref 7–15)
AST SERPL W P-5'-P-CCNC: 24 U/L (ref 0–45)
BILIRUB SERPL-MCNC: 0.3 MG/DL
BUN SERPL-MCNC: 14.6 MG/DL (ref 6–20)
CALCIUM SERPL-MCNC: 9.9 MG/DL (ref 8.8–10.4)
CHLORIDE SERPL-SCNC: 97 MMOL/L (ref 98–107)
CREAT SERPL-MCNC: 0.97 MG/DL (ref 0.51–0.95)
EGFRCR SERPLBLD CKD-EPI 2021: 71 ML/MIN/1.73M2
ERYTHROCYTE [DISTWIDTH] IN BLOOD BY AUTOMATED COUNT: 12.1 % (ref 10–15)
EST. AVERAGE GLUCOSE BLD GHB EST-MCNC: 108 MG/DL
FERRITIN SERPL-MCNC: 45 NG/ML (ref 6–175)
GLUCOSE SERPL-MCNC: 123 MG/DL (ref 70–99)
HBA1C MFR BLD: 5.4 % (ref 0–5.6)
HCO3 SERPL-SCNC: 24 MMOL/L (ref 22–29)
HCT VFR BLD AUTO: 40.5 % (ref 35–47)
HGB BLD-MCNC: 13.7 G/DL (ref 11.7–15.7)
IRON BINDING CAPACITY (ROCHE): 414 UG/DL (ref 240–430)
IRON SATN MFR SERPL: 26 % (ref 15–46)
IRON SERPL-MCNC: 108 UG/DL (ref 37–145)
MCH RBC QN AUTO: 30.5 PG (ref 26.5–33)
MCHC RBC AUTO-ENTMCNC: 33.8 G/DL (ref 31.5–36.5)
MCV RBC AUTO: 90 FL (ref 78–100)
PLATELET # BLD AUTO: 372 10E3/UL (ref 150–450)
POTASSIUM SERPL-SCNC: 4 MMOL/L (ref 3.4–5.3)
PROT SERPL-MCNC: 8.5 G/DL (ref 6.4–8.3)
RBC # BLD AUTO: 4.49 10E6/UL (ref 3.8–5.2)
SODIUM SERPL-SCNC: 136 MMOL/L (ref 135–145)
TSH SERPL DL<=0.005 MIU/L-ACNC: 1.3 UIU/ML (ref 0.3–4.2)
WBC # BLD AUTO: 10.7 10E3/UL (ref 4–11)

## 2025-02-11 PROCEDURE — 36415 COLL VENOUS BLD VENIPUNCTURE: CPT

## 2025-02-11 PROCEDURE — 82306 VITAMIN D 25 HYDROXY: CPT

## 2025-02-11 PROCEDURE — 83036 HEMOGLOBIN GLYCOSYLATED A1C: CPT

## 2025-02-11 PROCEDURE — 84443 ASSAY THYROID STIM HORMONE: CPT

## 2025-02-11 PROCEDURE — 85027 COMPLETE CBC AUTOMATED: CPT

## 2025-02-11 PROCEDURE — 86787 VARICELLA-ZOSTER ANTIBODY: CPT | Mod: 90

## 2025-02-11 PROCEDURE — 80053 COMPREHEN METABOLIC PANEL: CPT

## 2025-02-11 PROCEDURE — 82728 ASSAY OF FERRITIN: CPT

## 2025-02-11 PROCEDURE — 83550 IRON BINDING TEST: CPT

## 2025-02-11 PROCEDURE — 83540 ASSAY OF IRON: CPT

## 2025-02-11 PROCEDURE — 99000 SPECIMEN HANDLING OFFICE-LAB: CPT

## 2025-02-12 LAB — VIT D+METAB SERPL-MCNC: 50 NG/ML (ref 20–50)

## 2025-02-16 LAB — NONINV COLON CA DNA+OCC BLD SCRN STL QL: NEGATIVE

## 2025-02-23 ENCOUNTER — ANCILLARY PROCEDURE (OUTPATIENT)
Dept: GENERAL RADIOLOGY | Facility: CLINIC | Age: 50
End: 2025-02-23
Attending: PHYSICIAN ASSISTANT
Payer: COMMERCIAL

## 2025-02-23 ENCOUNTER — OFFICE VISIT (OUTPATIENT)
Dept: URGENT CARE | Facility: URGENT CARE | Age: 50
End: 2025-02-23
Payer: COMMERCIAL

## 2025-02-23 VITALS
BODY MASS INDEX: 24.23 KG/M2 | HEART RATE: 88 BPM | TEMPERATURE: 98.4 F | OXYGEN SATURATION: 98 % | DIASTOLIC BLOOD PRESSURE: 79 MMHG | SYSTOLIC BLOOD PRESSURE: 124 MMHG | RESPIRATION RATE: 18 BRPM | WEIGHT: 157 LBS

## 2025-02-23 DIAGNOSIS — J01.90 ACUTE SINUSITIS WITH SYMPTOMS > 10 DAYS: Primary | ICD-10-CM

## 2025-02-23 DIAGNOSIS — R05.1 ACUTE COUGH: ICD-10-CM

## 2025-02-23 PROCEDURE — 71046 X-RAY EXAM CHEST 2 VIEWS: CPT | Mod: TC | Performed by: STUDENT IN AN ORGANIZED HEALTH CARE EDUCATION/TRAINING PROGRAM

## 2025-02-23 PROCEDURE — 99214 OFFICE O/P EST MOD 30 MIN: CPT | Performed by: PHYSICIAN ASSISTANT

## 2025-02-23 RX ORDER — ALBUTEROL SULFATE 90 UG/1
INHALANT RESPIRATORY (INHALATION)
Qty: 18 G | Refills: 0 | Status: SHIPPED | OUTPATIENT
Start: 2025-02-23

## 2025-02-23 RX ORDER — PREDNISONE 20 MG/1
40 TABLET ORAL DAILY
Qty: 10 TABLET | Refills: 0 | Status: SHIPPED | OUTPATIENT
Start: 2025-02-23 | End: 2025-02-28

## 2025-02-23 NOTE — PROGRESS NOTES
Assessment & Plan     Acute sinusitis with symptoms > 10 days  Will treat with Augmentin twice daily x 10 days. Get plenty of rest and push fluids. Continue with supportive care. Follow up as needed.   - amoxicillin-clavulanate (AUGMENTIN) 875-125 MG tablet; Take 1 tablet by mouth 2 times daily for 10 days.    Acute cough  Reassurance, chest x-ray is clear. Will treat with prednisone burst and albuterol every 4-6 hrs as needed. Continue with supportive care. Return to clinic if symptoms worsen or do not improve; otherwise follow up as needed       - XR Chest 2 Views; Future  - predniSONE (DELTASONE) 20 MG tablet; Take 2 tablets (40 mg) by mouth daily for 5 days.  - albuterol (PROAIR HFA/PROVENTIL HFA/VENTOLIN HFA) 108 (90 Base) MCG/ACT inhaler; Inhale 2 puffs every 4-6 hours as needed for cough, wheezing, or shortness of breath                Return in about 1 week (around 3/2/2025), or if symptoms worsen or fail to improve.            Subjective   Chief Complaint   Patient presents with    Sinus Problem     Cough and cold symptoms with fever over a week ago, has progressed to sinus pressure, tightening in her chest with exertion, inhaler works for this.  Hx of sinus infections.       HPI     URI Adult    Onset of symptoms was 1.5+ week(s) ago.  Course of illness is worsening.    Severity moderate  Current and Associated symptoms: cough, sinus pressure, colored drainage  Treatment measures tried include flonase .  Predisposing factors include history asthma.                      Objective    /79   Pulse 88   Temp 98.4  F (36.9  C) (Tympanic)   Resp 18   Wt 71.2 kg (157 lb)   LMP 01/06/2025 (Approximate)   SpO2 98%   BMI 24.23 kg/m    Body mass index is 24.23 kg/m .      Physical Exam  Constitutional:       Appearance: She is well-developed.   HENT:      Head: Normocephalic.      Right Ear: Tympanic membrane and ear canal normal.      Left Ear: Tympanic membrane and ear canal normal.   Eyes:       Conjunctiva/sclera: Conjunctivae normal.   Cardiovascular:      Rate and Rhythm: Normal rate.      Heart sounds: Normal heart sounds.   Pulmonary:      Effort: Pulmonary effort is normal.      Breath sounds: Normal breath sounds.      Comments: Frequent reactive cough   Skin:     General: Skin is warm and dry.      Findings: No rash.   Psychiatric:         Behavior: Behavior normal.            CXR - Reviewed and interpreted by me: no acute infiltrate         Signed Electronically by: Naz Guido PA-C

## 2025-02-24 ENCOUNTER — MYC MEDICAL ADVICE (OUTPATIENT)
Dept: FAMILY MEDICINE | Facility: CLINIC | Age: 50
End: 2025-02-24
Payer: COMMERCIAL

## 2025-02-24 DIAGNOSIS — F90.9 ATTENTION DEFICIT HYPERACTIVITY DISORDER (ADHD), UNSPECIFIED ADHD TYPE: Primary | ICD-10-CM

## 2025-02-25 RX ORDER — DEXTROAMPHETAMINE SACCHARATE, AMPHETAMINE ASPARTATE MONOHYDRATE, DEXTROAMPHETAMINE SULFATE AND AMPHETAMINE SULFATE 5; 5; 5; 5 MG/1; MG/1; MG/1; MG/1
20 CAPSULE, EXTENDED RELEASE ORAL DAILY
Qty: 30 CAPSULE | Refills: 0 | Status: SHIPPED | OUTPATIENT
Start: 2025-02-25

## 2025-02-25 RX ORDER — DEXTROAMPHETAMINE SACCHARATE, AMPHETAMINE ASPARTATE, DEXTROAMPHETAMINE SULFATE AND AMPHETAMINE SULFATE 1.25; 1.25; 1.25; 1.25 MG/1; MG/1; MG/1; MG/1
5 TABLET ORAL DAILY PRN
Qty: 30 TABLET | Refills: 0 | Status: SHIPPED | OUTPATIENT
Start: 2025-02-25

## 2025-03-03 ENCOUNTER — MYC REFILL (OUTPATIENT)
Dept: FAMILY MEDICINE | Facility: CLINIC | Age: 50
End: 2025-03-03
Payer: COMMERCIAL

## 2025-03-03 DIAGNOSIS — L65.9 HAIR THINNING: ICD-10-CM

## 2025-03-03 DIAGNOSIS — I10 BENIGN ESSENTIAL HYPERTENSION: ICD-10-CM

## 2025-03-03 RX ORDER — SPIRONOLACTONE 50 MG/1
50 TABLET, FILM COATED ORAL DAILY
Qty: 90 TABLET | Refills: 1 | Status: SHIPPED | OUTPATIENT
Start: 2025-03-03

## 2025-03-03 RX ORDER — SPIRONOLACTONE 50 MG/1
50 TABLET, FILM COATED ORAL DAILY
Qty: 30 TABLET | Refills: 0 | OUTPATIENT
Start: 2025-03-03

## 2025-03-11 ENCOUNTER — OFFICE VISIT (OUTPATIENT)
Dept: OBGYN | Facility: CLINIC | Age: 50
End: 2025-03-11
Attending: PHYSICIAN ASSISTANT
Payer: COMMERCIAL

## 2025-03-11 ENCOUNTER — HOSPITAL ENCOUNTER (OUTPATIENT)
Dept: MAMMOGRAPHY | Facility: CLINIC | Age: 50
Discharge: HOME | End: 2025-03-11
Attending: PHYSICIAN ASSISTANT | Admitting: PHYSICIAN ASSISTANT
Payer: COMMERCIAL

## 2025-03-11 VITALS
TEMPERATURE: 98.3 F | RESPIRATION RATE: 16 BRPM | DIASTOLIC BLOOD PRESSURE: 86 MMHG | HEIGHT: 68 IN | HEART RATE: 84 BPM | WEIGHT: 156.4 LBS | BODY MASS INDEX: 23.7 KG/M2 | SYSTOLIC BLOOD PRESSURE: 126 MMHG

## 2025-03-11 DIAGNOSIS — N92.1 BREAKTHROUGH BLEEDING ON BIRTH CONTROL PILLS: ICD-10-CM

## 2025-03-11 DIAGNOSIS — N95.1 PERIMENOPAUSAL SYMPTOM: ICD-10-CM

## 2025-03-11 DIAGNOSIS — Z12.31 VISIT FOR SCREENING MAMMOGRAM: ICD-10-CM

## 2025-03-11 DIAGNOSIS — N92.4 EXCESSIVE BLEEDING IN PREMENOPAUSAL PERIOD: Primary | ICD-10-CM

## 2025-03-11 LAB
CHOLEST SERPL-MCNC: 219 MG/DL
ERYTHROCYTE [DISTWIDTH] IN BLOOD BY AUTOMATED COUNT: 12.8 % (ref 10–15)
FASTING STATUS PATIENT QL REPORTED: ABNORMAL
HCG UR QL: NEGATIVE
HCT VFR BLD AUTO: 38 % (ref 35–47)
HDLC SERPL-MCNC: 72 MG/DL
HGB BLD-MCNC: 12.7 G/DL (ref 11.7–15.7)
INTERNAL QC OK POCT: NORMAL
LDLC SERPL CALC-MCNC: 105 MG/DL
MCH RBC QN AUTO: 30.5 PG (ref 26.5–33)
MCHC RBC AUTO-ENTMCNC: 33.4 G/DL (ref 31.5–36.5)
MCV RBC AUTO: 91 FL (ref 78–100)
NONHDLC SERPL-MCNC: 147 MG/DL
PLATELET # BLD AUTO: 356 10E3/UL (ref 150–450)
POCT KIT EXPIRATION DATE: NORMAL
POCT KIT LOT NUMBER: NORMAL
RBC # BLD AUTO: 4.17 10E6/UL (ref 3.8–5.2)
TRIGL SERPL-MCNC: 209 MG/DL
WBC # BLD AUTO: 9.1 10E3/UL (ref 4–11)

## 2025-03-11 PROCEDURE — 99459 PELVIC EXAMINATION: CPT | Performed by: PHYSICIAN ASSISTANT

## 2025-03-11 PROCEDURE — 99243 OFF/OP CNSLTJ NEW/EST LOW 30: CPT | Mod: 25 | Performed by: PHYSICIAN ASSISTANT

## 2025-03-11 PROCEDURE — 3079F DIAST BP 80-89 MM HG: CPT | Performed by: PHYSICIAN ASSISTANT

## 2025-03-11 PROCEDURE — 77067 SCR MAMMO BI INCL CAD: CPT

## 2025-03-11 PROCEDURE — 83002 ASSAY OF GONADOTROPIN (LH): CPT | Performed by: PHYSICIAN ASSISTANT

## 2025-03-11 PROCEDURE — 82670 ASSAY OF TOTAL ESTRADIOL: CPT | Performed by: PHYSICIAN ASSISTANT

## 2025-03-11 PROCEDURE — 36415 COLL VENOUS BLD VENIPUNCTURE: CPT | Performed by: PHYSICIAN ASSISTANT

## 2025-03-11 PROCEDURE — 81025 URINE PREGNANCY TEST: CPT | Performed by: PHYSICIAN ASSISTANT

## 2025-03-11 PROCEDURE — 83001 ASSAY OF GONADOTROPIN (FSH): CPT | Performed by: PHYSICIAN ASSISTANT

## 2025-03-11 PROCEDURE — 80061 LIPID PANEL: CPT | Performed by: PHYSICIAN ASSISTANT

## 2025-03-11 PROCEDURE — 84270 ASSAY OF SEX HORMONE GLOBUL: CPT | Performed by: PHYSICIAN ASSISTANT

## 2025-03-11 PROCEDURE — 58100 BIOPSY OF UTERUS LINING: CPT | Performed by: OBSTETRICS & GYNECOLOGY

## 2025-03-11 PROCEDURE — 3074F SYST BP LT 130 MM HG: CPT | Performed by: PHYSICIAN ASSISTANT

## 2025-03-11 PROCEDURE — 77063 BREAST TOMOSYNTHESIS BI: CPT

## 2025-03-11 PROCEDURE — 88305 TISSUE EXAM BY PATHOLOGIST: CPT | Performed by: PATHOLOGY

## 2025-03-11 PROCEDURE — 85027 COMPLETE CBC AUTOMATED: CPT | Performed by: PHYSICIAN ASSISTANT

## 2025-03-11 PROCEDURE — 84146 ASSAY OF PROLACTIN: CPT | Performed by: PHYSICIAN ASSISTANT

## 2025-03-11 NOTE — PROGRESS NOTES
[facility] Endometrial Biopsy Procedure Note    Olesya Cintron  1975  [id number]    The patient was counseled on the risks (including risk of pain and bleeding). Verbal and written consent were obtained.  A    Technique: The patient was placed in the dorsal lithotomy position.  A speculum was placed in the vagina and the cervix visualized. The cervix was cleaned with betadine swabs x3. The rocket curet was passed through the cervix to the fundus with return of adequate tissue. This was placed in specimen jar and sent for permanent pathology. All instruments were removed.  The patient tolerated the procedure well.  She was given post op instructions which included activity and pelvic restrictions.      A/P:  s/p EMB.     Narcisa Carey MD  OB/GYN   3/11/2025

## 2025-03-11 NOTE — NURSING NOTE
"Initial /86 (BP Location: Right arm, Patient Position: Chair, Cuff Size: Adult Regular)   Pulse 84   Temp 98.3  F (36.8  C) (Tympanic)   Resp 16   Ht 1.715 m (5' 7.5\")   Wt 70.9 kg (156 lb 6.4 oz)   LMP 03/04/2025 (Approximate)   BMI 24.13 kg/m   Estimated body mass index is 24.13 kg/m  as calculated from the following:    Height as of this encounter: 1.715 m (5' 7.5\").    Weight as of this encounter: 70.9 kg (156 lb 6.4 oz). .    Sophia Ventura, [facility]    "

## 2025-03-11 NOTE — PROGRESS NOTES
[facility]  OB/GYN Clinic   Gynecology Consult Note    CC:   No chief complaint on file.      HPI: Ms. Cintron is a 49 year old  being seen for GYN consultation for heavy menses by the request of her provider, Racheal Mullen. Today she reports irregular cycles the past 6-12 months. She states periods in the last 6 months have never been consistent. She is still taking oral birth control pills daily. She does not do continuous cycles with her birth control, but has noticed the past 6 months that she will skip her period some months and other months it will come mid cycle while she is taking her birth control pills. She has noticed that her periods are always 7 days and at times heavier now since they have become more irregular.     History of UTIs in the past, but that has improved due using a ph wash.     She has noticed hot flashes; which do not bother her. Vaginal dryness and difficulty sleeping at night.     GYN Hx: Reports menarche at age 12, regular menses every 28-30 days, lasting 7 days. Denies significant dysmenorrhea or spotting in between periods. Denies any hx of ovarian cysts, uterine fibroids or polyps. Denies any hx of STI or PID. Currently sexually active with male partner. Currently using oral birth control for contraception. Patient tried IUD with heavy blood loss and pain. Denies any vaginal discharge, vulvar itching/burning or pain. Denies any dyspareunia or pelvic pain with defecation or urination.  Denies any history of breast mass or disease. She denies any facial/back/abdominal hair growth or facial/body acne. She denies any nipple drainage or visual field defects.   Pt denies any personal or family history of VTE. She is a non-smoker. She denies a history of significant migraines with aura, liver disease. Positive hypertension controlled with medication.     Denies any hx of excessive bleeding with dental work or cuts.     ROS: A 10 pt ROS was completed and found  "to be otherwise negative unless mentioned in the HPI.     PMH:   Past Medical History:   Diagnosis Date    Depression     age 12    Eating disorder     anorexia age 12    Helicobacter pylori (H. pylori) 2007    txed with abx    History of urinary tract infection 2008    FREQUENT Q2 month infections, \"documented\" by cx at another facility; txed with hydrodistention at age 6 for frequent UTI      Lyme disease 07/15/2019    MEDICAL HISTORY OF -     fx tailbone age 12    Mononucleosis     age 11    Pelvic pain in female 2012    Personal history of other malignant neoplasm of skin     precancer left ear, left foot     labor ,    Recurrent urinary tract infection 2016    S/P LEEP of cervix 1995    path results??    Vulvar vestibulitis 2013       PSHx:   Past Surgical History:   Procedure Laterality Date    HC REMOVE TONSILS/ADENOIDS,11+ Y/O      LAPAROSCOPIC CHOLECYSTECTOMY N/A 2022    Procedure: CHOLECYSTECTOMY, LAPAROSCOPIC;  Surgeon: Curry Kapoor, DO;  Location: [facility]    LEEP TX, CERVICAL      SURGICAL HISTORY OF -   age 6    Hydrodistention of bladder    SURGICAL HISTORY OF -       Local excision of skin precancer- foot and ear    UPPER GI ENDOSCOPY      H-pylori    ZZHC COLONOSCOPY THRU STOMA, DIAGNOSTIC      normal       OBHx:   OB History    Para Term  AB Living   4 3 2 1 1 3   SAB IAB Ectopic Multiple Live Births   1 0 0 0 3      # Outcome Date GA Lbr Gerardo/2nd Weight Sex Type Anes PTL Lv   4 SAB 12              Birth Comments: System Generated. Please review and update pregnancy details.   3  02/21/10 36w0d 02:18 3.118 kg (6 lb 14 oz) M    DEVON      Name: Silvio      Apgar1: 9  Apgar5: 9   2 Term 95 37w0d 04:00 2.722 kg (6 lb) F    DEVON      Birth Comments: bedrest  -elevated bp in labor   1 Term 91 37w0d 04:00 2.637 kg (5 lb 13 oz) M    DEVON      Birth Comments: bedrest  7 " months      Obstetric Comments    x 2, PTL with bedrest       Medications:   Current Outpatient Medications   Medication Sig Dispense Refill    albuterol (PROAIR HFA/PROVENTIL HFA/VENTOLIN HFA) 108 (90 Base) MCG/ACT inhaler Inhale 2 puffs every 4-6 hours as needed for cough, wheezing, or shortness of breath 18 g 0    albuterol (PROAIR HFA/PROVENTIL HFA/VENTOLIN HFA) 108 (90 Base) MCG/ACT inhaler Inhale 2 puffs into the lungs every 6 hours as needed for shortness of breath, wheezing or cough. 18 g 0    amphetamine-dextroamphetamine (ADDERALL XR) 20 MG 24 hr capsule Take 1 capsule (20 mg) by mouth daily. 30 capsule 0    amphetamine-dextroamphetamine (ADDERALL) 5 MG tablet Take 1 tablet (5 mg) by mouth daily as needed (for breakthrough sx in afternoon). 30 tablet 0    cetirizine (ZYRTEC) 10 MG tablet Take 1 tablet (10 mg) by mouth daily. 90 tablet 3    fluticasone (FLONASE) 50 MCG/ACT nasal spray Spray 1 spray into both nostrils daily. 48 g 3    norgestimate-ethinyl estradiol (ORTHO-CYCLEN) 0.25-35 MG-MCG tablet Take 1 tablet by mouth daily. No refill without seeing GYN 84 tablet 0    order for DME Equipment being ordered: Valved chamber spacer, use with inhaler. 1 Device 0    spironolactone (ALDACTONE) 50 MG tablet TAKE ONE TABLET BY MOUTH ONCE DAILY 90 tablet 1    traZODone (DESYREL) 50 MG tablet Take 1-1.5 tablets (50-75 mg) by mouth at bedtime. 135 tablet 3     No current facility-administered medications for this visit.       Allergies:      Allergies   Allergen Reactions    Ciprofloxacin Nausea and Vomiting    Penicillin [Penicillins]      Hives         Social History:   Social History     Socioeconomic History    Marital status:      Spouse name: Not on file    Number of children: 2    Years of education: Not on file    Highest education level: Not on file   Occupational History    Occupation: Teacher     Employer: [employer]   Tobacco Use    Smoking status: Former     Types: Cigarettes     Smokeless tobacco: Never    Tobacco comments:     uses nicotene gum on occassion   Vaping Use    Vaping status: Never Used   Substance and Sexual Activity    Alcohol use: Yes     Comment: one drink per week    Drug use: No    Sexual activity: Yes     Partners: Male     Birth control/protection: Pill   Other Topics Concern    Parent/sibling w/ CABG, MI or angioplasty before 65F 55M? Yes     Comment: father- age 56   Social History Narrative    Not on file     Social Drivers of Health     Financial Resource Strain: Low Risk  (1/22/2025)    Financial Resource Strain     Within the past 12 months, have you or your family members you live with been unable to get utilities (heat, electricity) when it was really needed?: No   Food Insecurity: Low Risk  (1/22/2025)    Food Insecurity     Within the past 12 months, did you worry that your food would run out before you got money to buy more?: No     Within the past 12 months, did the food you bought just not last and you didn t have money to get more?: No   Transportation Needs: Low Risk  (1/22/2025)    Transportation Needs     Within the past 12 months, has lack of transportation kept you from medical appointments, getting your medicines, non-medical meetings or appointments, work, or from getting things that you need?: No   Physical Activity: Sufficiently Active (1/22/2025)    Exercise Vital Sign     Days of Exercise per Week: 5 days     Minutes of Exercise per Session: 30 min   Stress: No Stress Concern Present (1/22/2025)    [employer] - Occupational Stress Questionnaire     Feeling of Stress : Only a little   Social Connections: Unknown (1/22/2025)    Social Connection and Isolation Panel [NHANES]     Frequency of Communication with Friends and Family: Not on file     Frequency of Social Gatherings with Friends and Family: Twice a week     Attends Jain Services: Not on file     Active Member of Clubs or Organizations: Not on file      Attends Club or Organization Meetings: Not on file     Marital Status: Not on file   Interpersonal Safety: Low Risk  (1/22/2025)    Interpersonal Safety     Do you feel physically and emotionally safe where you currently live?: Yes     Within the past 12 months, have you been hit, slapped, kicked or otherwise physically hurt by someone?: No     Within the past 12 months, have you been humiliated or emotionally abused in other ways by your partner or ex-partner?: No   Housing Stability: Low Risk  (1/22/2025)    Housing Stability     Do you have housing? : Yes     Are you worried about losing your housing?: No     Social History     Socioeconomic History    Marital status:     Number of children: 2   Occupational History    Occupation: Teacher     Employer: [employer]   Tobacco Use    Smoking status: Former     Types: Cigarettes    Smokeless tobacco: Never    Tobacco comments:     uses nicotene gum on occassion   Vaping Use    Vaping status: Never Used   Substance and Sexual Activity    Alcohol use: Yes     Comment: one drink per week    Drug use: No    Sexual activity: Yes     Partners: Male     Birth control/protection: Pill   Other Topics Concern    Parent/sibling w/ CABG, MI or angioplasty before 65F 55M? Yes     Comment: father- age 56     Social Drivers of Health     Financial Resource Strain: Low Risk  (1/22/2025)    Financial Resource Strain     Within the past 12 months, have you or your family members you live with been unable to get utilities (heat, electricity) when it was really needed?: No   Food Insecurity: Low Risk  (1/22/2025)    Food Insecurity     Within the past 12 months, did you worry that your food would run out before you got money to buy more?: No     Within the past 12 months, did the food you bought just not last and you didn t have money to get more?: No   Transportation Needs: Low Risk  (1/22/2025)    Transportation Needs     Within the past 12 months, has lack of  transportation kept you from medical appointments, getting your medicines, non-medical meetings or appointments, work, or from getting things that you need?: No   Physical Activity: Sufficiently Active (1/22/2025)    Exercise Vital Sign     Days of Exercise per Week: 5 days     Minutes of Exercise per Session: 30 min   Stress: No Stress Concern Present (1/22/2025)    [employer] [employer] - Occupational Stress Questionnaire     Feeling of Stress : Only a little   Social Connections: Unknown (1/22/2025)    Social Connection and Isolation Panel [NHANES]     Frequency of Social Gatherings with Friends and Family: Twice a week   Interpersonal Safety: Low Risk  (1/22/2025)    Interpersonal Safety     Do you feel physically and emotionally safe where you currently live?: Yes     Within the past 12 months, have you been hit, slapped, kicked or otherwise physically hurt by someone?: No     Within the past 12 months, have you been humiliated or emotionally abused in other ways by your partner or ex-partner?: No   Housing Stability: Low Risk  (1/22/2025)    Housing Stability     Do you have housing? : Yes     Are you worried about losing your housing?: No       Family History:   Family History   Problem Relation Age of Onset    Thyroid Disease Mother     Hypertension Mother     Lipids Mother     Parkinsonism Mother     Alopecia Mother     Lupus Mother     Iron deficiency Mother     Lipids Father     Hypertension Father     Heart Disease Father         quintuple bypass age 53    Gastrointestinal Disease Father         ulcer    Alzheimer Disease Father     Hypertension Brother     Cardiovascular Maternal Grandfather         MI    Diabetes Maternal Grandfather     Alzheimer Disease Paternal Grandmother     Prostate Cancer Paternal Grandfather     Attention Deficit Disorder Daughter     Iron deficiency Daughter     Breast Cancer No family hx of     Colon Cancer No family hx of      Denies any family hx of  "bleeding disorders or reaction to anesthesia.     Physical Exam:   There were no vitals filed for this visit.   Estimated body mass index is 24.23 kg/m  as calculated from the following:    Height as of 1/22/25: 1.715 m (5' 7.5\").    Weight as of 2/23/25: 71.2 kg (157 lb).    Gen: Pleasant, talkative female in no apparent distress   Respiratory: breathing comfortably on room air   Cardiac: Warm and well-perfused.   GI: Abd soft and non-tender, +BS  : External genitalia is free of lesion. Urethra and bartholin glands normal.  Vaginal mucosa is moist and pink without unusual discharge.  Cervix is without lesions or discharge.  Bimanual exam reveals mobile anteverted uterus without cervical motion tenderness.  Cervix is very short due to history of cervical cancer with LEEP in past. Adenexa are mobile and non-tender bilaterally. No appreciable adnexal enlargement. No tenderness over the bladder. Perineum intact. Eliseo Stage 5. EMB obtained today.   Rectal: no masses or hemorrhoids visually appreciated  Derm: no acanthosis nigricans, ance or facial/back/abdominal hair growth patterns   MSK: Grossly normal movement of all four extremities  Psych: mood and affect bright     EMB procedure done by Dr. Carey today. See her note.     A&P:     (N92.4) Excessive bleeding in premenopausal period  (primary encounter diagnosis)  Comment: work up in process. No concerns for infection or STI. EMB done and in process. Will check labs and hormones, but discussed with patient that hormones may not be accurate due to being on oral birth control. Pelvic US also ordered.   Plan: Estradiol, Follicle stimulating hormone,         Luteinizing Hormone, Prolactin, CBC with         platelets, US Pelvic Complete with         Transvaginal, HCG qualitative urine POCT,         Surgical Pathology Exam, ENDOMETRIAL BIOPSY W/O        CERVICAL DILATION, 17 OH progesterone,         Testosterone Free and Total, Lipid panel reflex        to direct LDL " Non-fasting, ENDOMETRIAL BIOPSY         W/O CERVICAL DILATION          (N92.1) Breakthrough bleeding on birth control pills  Comment: see note above.     (N95.1) Perimenopausal symptom  Comment: hot flashes that patient can manage well. Vaginal dryness: discussed oil based lubricants, vaginal hyaluronic acid vaginal suppositories vs vaginal estrogen cream. Patient would like to try vaginal suppositories first.     Health maintenance: pap smear is up to date.   Return to clinic in 4-6 weeks to discuss results and further treatment plan as needed.     Estelita De León PA-C

## 2025-03-12 LAB
ESTRADIOL SERPL-MCNC: 6 PG/ML
FSH SERPL IRP2-ACNC: 8.2 MIU/ML
LH SERPL-ACNC: 9.4 MIU/ML
PROLACTIN SERPL 3RD IS-MCNC: 14 NG/ML (ref 5–23)
SHBG SERPL-SCNC: 189 NMOL/L (ref 30–135)

## 2025-03-13 LAB
PATH REPORT.COMMENTS IMP SPEC: NORMAL
PATH REPORT.FINAL DX SPEC: NORMAL
PATH REPORT.GROSS SPEC: NORMAL
PATH REPORT.MICROSCOPIC SPEC OTHER STN: NORMAL
PATH REPORT.RELEVANT HX SPEC: NORMAL
PHOTO IMAGE: NORMAL

## 2025-03-16 ENCOUNTER — TELEPHONE (OUTPATIENT)
Dept: FAMILY MEDICINE | Facility: CLINIC | Age: 50
End: 2025-03-16
Payer: COMMERCIAL

## 2025-03-16 LAB
TESTOST FREE SERPL-MCNC: 0.09 NG/DL
TESTOST SERPL-MCNC: 19 NG/DL (ref 8–60)

## 2025-03-16 NOTE — TELEPHONE ENCOUNTER
Prior Authorization required on Amphetamine 5mg  Insurance Phone [phone]  Patient ID [id number]  Please contact the pharmacy with Prior Auth status (approved/denied)    Thank you  Leena Hernandez [facility]  [phone]

## 2025-03-18 NOTE — TELEPHONE ENCOUNTER
Prior Authorization Approval    Authorization Effective Date: 3/18/2025  Authorization Expiration Date: 3/18/2028  Medication: Prior auth needed on Amphetamine 5mg  Approved Dose/Quantity:   Reference #:     Insurance Company: [employer] - Phone [phone] Fax [phone]  Expected CoPay:       CoPay Card Available:      Foundation Assistance Needed:    Which Pharmacy is filling the prescription (Not needed for infusion/clinic administered): [facility] [place], [place] - [address] [place] [address]  Pharmacy Notified:  yes  Patient Notified:  yes- Pharmacy will contact patient when ready to /ship

## 2025-03-18 NOTE — TELEPHONE ENCOUNTER
Central Prior Authorization Team   Phone: [phone]    PA Initiation    Medication: Prior auth needed on Amphetamine 5mg  Insurance Company: [employer] - Phone [phone] Fax [phone]  Pharmacy Filling the Rx: [facility] [place], [place] - [address]  Filling Pharmacy Phone: [phone]  Filling Pharmacy Fax:    Start Date: 3/18/2025

## 2025-03-26 ENCOUNTER — VIRTUAL VISIT (OUTPATIENT)
Dept: FAMILY MEDICINE | Facility: CLINIC | Age: 50
End: 2025-03-26
Payer: COMMERCIAL

## 2025-03-26 DIAGNOSIS — F90.9 ATTENTION DEFICIT HYPERACTIVITY DISORDER (ADHD), UNSPECIFIED ADHD TYPE: Primary | ICD-10-CM

## 2025-03-26 DIAGNOSIS — Z30.41 ENCOUNTER FOR SURVEILLANCE OF CONTRACEPTIVE PILLS: ICD-10-CM

## 2025-03-26 PROCEDURE — 98006 SYNCH AUDIO-VIDEO EST MOD 30: CPT | Performed by: PHYSICIAN ASSISTANT

## 2025-03-26 RX ORDER — DEXTROAMPHETAMINE SACCHARATE, AMPHETAMINE ASPARTATE MONOHYDRATE, DEXTROAMPHETAMINE SULFATE AND AMPHETAMINE SULFATE 5; 5; 5; 5 MG/1; MG/1; MG/1; MG/1
20 CAPSULE, EXTENDED RELEASE ORAL DAILY
Qty: 30 CAPSULE | Refills: 0 | Status: SHIPPED | OUTPATIENT
Start: 2025-05-17 | End: 2025-03-31

## 2025-03-26 RX ORDER — DEXTROAMPHETAMINE SACCHARATE, AMPHETAMINE ASPARTATE, DEXTROAMPHETAMINE SULFATE AND AMPHETAMINE SULFATE 1.25; 1.25; 1.25; 1.25 MG/1; MG/1; MG/1; MG/1
5 TABLET ORAL DAILY PRN
Qty: 30 TABLET | Refills: 0 | Status: SHIPPED | OUTPATIENT
Start: 2025-04-17

## 2025-03-26 RX ORDER — NORGESTIMATE AND ETHINYL ESTRADIOL 0.25-0.035
1 KIT ORAL DAILY
Qty: 84 TABLET | Refills: 3 | Status: SHIPPED | OUTPATIENT
Start: 2025-03-26

## 2025-03-26 RX ORDER — DEXTROAMPHETAMINE SACCHARATE, AMPHETAMINE ASPARTATE, DEXTROAMPHETAMINE SULFATE AND AMPHETAMINE SULFATE 1.25; 1.25; 1.25; 1.25 MG/1; MG/1; MG/1; MG/1
5 TABLET ORAL DAILY PRN
Qty: 30 TABLET | Refills: 0 | Status: SHIPPED | OUTPATIENT
Start: 2025-05-17

## 2025-03-26 RX ORDER — DEXTROAMPHETAMINE SACCHARATE, AMPHETAMINE ASPARTATE MONOHYDRATE, DEXTROAMPHETAMINE SULFATE AND AMPHETAMINE SULFATE 5; 5; 5; 5 MG/1; MG/1; MG/1; MG/1
20 CAPSULE, EXTENDED RELEASE ORAL DAILY
Qty: 30 CAPSULE | Refills: 0 | Status: SHIPPED | OUTPATIENT
Start: 2025-04-17 | End: 2025-04-09

## 2025-03-26 NOTE — NURSING NOTE
"Chief Complaint   Patient presents with    Results       Initial LMP 03/04/2025 (Approximate)  Estimated body mass index is 24.13 kg/m  as calculated from the following:    Height as of 3/11/25: 1.715 m (5' 7.5\").    Weight as of 3/11/25: 70.9 kg (156 lb 6.4 oz).    Patient presents to the clinic using No DME    Is there anyone who you would like to be able to receive your results? No  If yes have patient fill out RAFI      "

## 2025-03-26 NOTE — PROGRESS NOTES
Olesya is a 49 year old who is being evaluated via a billable video visit.    How would you like to obtain your AVS? MyChart  If the video visit is dropped, the invitation should be resent by: Text to cell phone: [phone]  Will anyone else be joining your video visit? No      Assessment & Plan     Attention deficit hyperactivity disorder (ADHD), unspecified ADHD type  Fair control but afternoon breakthrough symptoms and sometimes insomnia.   Discussed optimal timing for 2nd dose v alternatives.  She will try adding the IR dose to her lunchbox to take consistently.  Lunch actually turns out to be optimal timing based on when XR dose is felt to be wearing off.  - amphetamine-dextroamphetamine (ADDERALL) 5 MG tablet; Take 1 tablet (5 mg) by mouth daily as needed (for breakthrough sx in afternoon).  - amphetamine-dextroamphetamine (ADDERALL) 5 MG tablet; Take 1 tablet (5 mg) by mouth daily as needed (for breakthrough sx in afternoon).  - amphetamine-dextroamphetamine (ADDERALL XR) 20 MG 24 hr capsule; Take 1 capsule (20 mg) by mouth daily.    Encounter for surveillance of contraceptive pills  Refill stable.  Medication change is not desired, rediscuss trying minipill next yr.  - norgestimate-ethinyl estradiol (ORTHO-CYCLEN) 0.25-35 MG-MCG tablet; Take 1 tablet by mouth daily.    Patient Instructions   Send refill request in 3 months if doing well  Must be seen at least every 6 months.      Subjective   Olesya is a 49 year old, presenting for the following health issues:  Results      3/26/2025     4:09 PM   Additional Questions   Roomed by Marlen MORENO CMA     HPI    ADHD Follow-up  Status since last visit: Stable  Taking medications as prescribed:  Yes  ADHD Medication       Amphetamines Disp Start End     amphetamine-dextroamphetamine (ADDERALL XR) 20 MG 24 hr capsule 30 capsule 2/25/2025 --    Sig - Route: Take 1 capsule (20 mg) by mouth daily. - Oral    Class: E-Prescribe    Earliest Fill Date: 2/25/2025      amphetamine-dextroamphetamine (ADDERALL) 5 MG tablet 30 tablet 2/25/2025 --    Sig - Route: Take 1 tablet (5 mg) by mouth daily as needed (for breakthrough sx in afternoon). - Oral    Class: E-Prescribe    Earliest Fill Date: 2/25/2025          Concerns with medications: no     Side effects noted: crash in afternoon         Objective         Pt would like to discuss lab results from women's clinic   Vitals:  No vitals were obtained today due to virtual visit.        Video-Visit Details    Type of service:  Video Visit   Originating Location (pt. Location): Other workplace    Distant Location (provider location):  On-site  Platform used for Video Visit: Noni  Signed Electronically by: Racheal Santa PA-C

## 2025-03-31 DIAGNOSIS — F90.9 ATTENTION DEFICIT HYPERACTIVITY DISORDER (ADHD), UNSPECIFIED ADHD TYPE: ICD-10-CM

## 2025-03-31 NOTE — TELEPHONE ENCOUNTER
Hi Olesya is out of her Adderall and the prescription we have on file is dated for 4/17. We will need a prescription to get her enough until then since she picked up a 30 day supply on 3/1/25.    Thank you,   Anjel Tabares, [facility]

## 2025-04-01 RX ORDER — DEXTROAMPHETAMINE SACCHARATE, AMPHETAMINE ASPARTATE MONOHYDRATE, DEXTROAMPHETAMINE SULFATE AND AMPHETAMINE SULFATE 5; 5; 5; 5 MG/1; MG/1; MG/1; MG/1
20 CAPSULE, EXTENDED RELEASE ORAL DAILY
Qty: 30 CAPSULE | Refills: 0 | Status: SHIPPED | OUTPATIENT
Start: 2025-05-17

## 2025-04-08 ENCOUNTER — MYC MEDICAL ADVICE (OUTPATIENT)
Dept: FAMILY MEDICINE | Facility: CLINIC | Age: 50
End: 2025-04-08
Payer: COMMERCIAL

## 2025-04-08 DIAGNOSIS — F90.9 ATTENTION DEFICIT HYPERACTIVITY DISORDER (ADHD), UNSPECIFIED ADHD TYPE: ICD-10-CM

## 2025-04-09 RX ORDER — DEXTROAMPHETAMINE SACCHARATE, AMPHETAMINE ASPARTATE MONOHYDRATE, DEXTROAMPHETAMINE SULFATE AND AMPHETAMINE SULFATE 5; 5; 5; 5 MG/1; MG/1; MG/1; MG/1
20 CAPSULE, EXTENDED RELEASE ORAL DAILY
Qty: 30 CAPSULE | Refills: 0 | Status: SHIPPED | OUTPATIENT
Start: 2025-04-09

## 2025-04-09 NOTE — TELEPHONE ENCOUNTER
Pls see TrunqShow message below.       Last office visit: 1/22/2025 ; last virtual visit: 3/26/2025 with prescribing provider:     Future Office Visit:      RX pended, message routed to provider for consideration of refill.     Julie Behrendt RN

## 2025-06-23 DIAGNOSIS — F90.9 ATTENTION DEFICIT HYPERACTIVITY DISORDER (ADHD), UNSPECIFIED ADHD TYPE: ICD-10-CM

## 2025-06-23 RX ORDER — DEXTROAMPHETAMINE SACCHARATE, AMPHETAMINE ASPARTATE MONOHYDRATE, DEXTROAMPHETAMINE SULFATE AND AMPHETAMINE SULFATE 5; 5; 5; 5 MG/1; MG/1; MG/1; MG/1
20 CAPSULE, EXTENDED RELEASE ORAL DAILY
Qty: 30 CAPSULE | Refills: 0 | Status: SHIPPED | OUTPATIENT
Start: 2025-08-22

## 2025-06-23 RX ORDER — DEXTROAMPHETAMINE SACCHARATE, AMPHETAMINE ASPARTATE MONOHYDRATE, DEXTROAMPHETAMINE SULFATE AND AMPHETAMINE SULFATE 5; 5; 5; 5 MG/1; MG/1; MG/1; MG/1
20 CAPSULE, EXTENDED RELEASE ORAL DAILY
Qty: 30 CAPSULE | Refills: 0 | Status: SHIPPED | OUTPATIENT
Start: 2025-06-23

## 2025-06-23 RX ORDER — DEXTROAMPHETAMINE SACCHARATE, AMPHETAMINE ASPARTATE MONOHYDRATE, DEXTROAMPHETAMINE SULFATE AND AMPHETAMINE SULFATE 5; 5; 5; 5 MG/1; MG/1; MG/1; MG/1
20 CAPSULE, EXTENDED RELEASE ORAL DAILY
Qty: 30 CAPSULE | Refills: 0 | Status: SHIPPED | OUTPATIENT
Start: 2025-07-23

## 2025-07-03 ENCOUNTER — OFFICE VISIT (OUTPATIENT)
Dept: URGENT CARE | Facility: URGENT CARE | Age: 50
End: 2025-07-03
Payer: COMMERCIAL

## 2025-07-03 VITALS
RESPIRATION RATE: 16 BRPM | OXYGEN SATURATION: 97 % | BODY MASS INDEX: 23.15 KG/M2 | HEART RATE: 86 BPM | SYSTOLIC BLOOD PRESSURE: 130 MMHG | WEIGHT: 150 LBS | TEMPERATURE: 97.9 F | DIASTOLIC BLOOD PRESSURE: 82 MMHG

## 2025-07-03 DIAGNOSIS — N76.0 BV (BACTERIAL VAGINOSIS): Primary | ICD-10-CM

## 2025-07-03 DIAGNOSIS — B96.89 BV (BACTERIAL VAGINOSIS): Primary | ICD-10-CM

## 2025-07-03 DIAGNOSIS — R30.0 DYSURIA: ICD-10-CM

## 2025-07-03 LAB
BACTERIA #/AREA URNS HPF: ABNORMAL /HPF
CLUE CELLS: PRESENT
MUCOUS THREADS #/AREA URNS LPF: PRESENT /LPF
RBC #/AREA URNS AUTO: ABNORMAL /HPF
SQUAMOUS #/AREA URNS AUTO: ABNORMAL /LPF
TRICHOMONAS, WET PREP: ABNORMAL
WBC #/AREA URNS AUTO: ABNORMAL /HPF
WBC'S/HIGH POWER FIELD, WET PREP: ABNORMAL
YEAST, WET PREP: ABNORMAL

## 2025-07-03 RX ORDER — METRONIDAZOLE 7.5 MG/G
1 GEL VAGINAL DAILY
Qty: 35 G | Refills: 0 | Status: SHIPPED | OUTPATIENT
Start: 2025-07-03 | End: 2025-07-10

## 2025-07-03 NOTE — PROGRESS NOTES
Urgent Care Clinic Visit    Chief Complaint   Patient presents with    Urinary Problem     Up all night going to the bathroom, burning, back pain. Says was given nitrofurantoin says sx were better then 1 day later all sx came back.                7/3/2025    11:27 AM   Additional Questions   Roomed by Nayla MOLINA

## 2025-07-03 NOTE — PROGRESS NOTES
Assessment & Plan       ICD-10-CM    1. BV (bacterial vaginosis)  N76.0 metroNIDAZOLE (METROGEL) 0.75 % vaginal gel    B96.89       2. Dysuria  R30.0 UA Microscopic with Reflex to Culture     Wet prep - Clinic Collect     Urine Culture Aerobic Bacterial - lab collect     Urine Culture Aerobic Bacterial - lab collect     CANCELED: UA Macroscopic with reflex to Microscopic and Culture           I am going to treat her with metronidazole gel.  We discussed that I cannot tell if she had a UTI before she went on the nitrofurantoin because no sample was collected.  Her microscopic exam today is not convincing but we will send a culture to be sure.  Also with a holiday weekend coming up she wants to be able to consume a little bit of alcohol but denies being a heavy drinker.  We discussed the possible reaction between metronidazole and alcohol and she has had this reaction in the past.  should have a little bit less risk of that with topical therapy versus oral therapy so we are going to give her the vaginal gel instead of oral pills.  Still cautioned her on any alcohol intake because that risk is still present.  Supportive cares also discussed.    See patient instructions which were reviewed in detail with patient:    Patient Instructions   I am treating you with metronidazole gel, one applicator in the vagina once daily for 7 days.   Using it vaginally will give you less overall absorption into the body, but there is still a chance of reacting with alcohol, so I recommend avoidance or at least minimizing alcohol intake while using the medication and for 24 hours after completing therapy. Even with the vaginal gel it's not recommended to use alcohol.     You MAY continue to use your pyridium for pain IF HELPFUL, but since it's not a bladder infection it's not likely to be beneficial.     We will send in the urine culture and notify you with results. If positive showing infection, you'll need additional treatment for that.  "    Continue to drink plenty of water to keep well hydrated and flush the kidneys/bladder well.           Loulou Estrella MD  [facility]    Elgin Dacosta is a 50 year old female who presents to clinic today for the following health issues:  Chief Complaint   Patient presents with    Urinary Problem     Up all night going to the bathroom, burning, back pain. Says was given nitrofurantoin says sx were better then 1 day later all sx came back.      HPI    See notes above.   She recently had a virtual visit for UTI and was started on nitrofurantoin.  1 day into therapy she felt a lot better but then after finishing her antibiotics the symptoms returned almost immediately.  She has a little bit of dull back pain but no fever.  She notes nocturia and dysuria, no blood in the urine but she cannot really tell because she is taking Pyridium.  She has a history of UTIs and many years ago she used to take prophylactic antibiotics after intercourse because that always was a trigger.  She did have recent intercourse with her  prior to this onset.  No concern for STDs and she has 1 partner.  She just got her period about a week ago and is going through some perimenopausal changes.  No significant discharge but there is some irritation externally.  No history of kidney stones.    7 pt ROS is otherwise negative except as noted in HPI.      Objective    /82   Pulse 86   Temp 97.9  F (36.6  C) (Tympanic)   Resp 16   Wt 68 kg (150 lb)   SpO2 97%   BMI 23.15 kg/m    Physical Exam   Vitals noted.  Patient alert, oriented, and in no acute distress.   Abdomen:  Soft, slightly tender with palpation over the bladder, described as \"more pressure\", nondistended with good bowel sounds and no masses or hepatosplenomegaly.   Vaginal exam reveals normal external and internal genitalia.  Cervix is closed, long and thick.  No lesions or abnormalities seen.  Slight yellowish " "discharge present. Wet prep sent. Bimanual exam reveals a nontender, nongravid uterus with no CMT.  No adnexal masses or tenderness.   Just feels \"outside\" pressure with exam.     Wet prep shows clue cells.   UA unable due to use of pyridium.   Microscopic exam as below. Reviewed with patient.     Results for orders placed or performed in visit on 07/03/25   UA Microscopic with Reflex to Culture     Status: Abnormal   Result Value Ref Range    Bacteria Urine Few (A) None Seen /HPF    RBC Urine 0-2 0-2 /HPF /HPF    WBC Urine 0-5 0-5 /HPF /HPF    Squamous Epithelials Urine Few (A) None Seen /LPF    Mucus Urine Present (A) None Seen /LPF    Narrative    Urine Culture not indicated   Wet prep - Clinic Collect     Status: Abnormal    Specimen: Vagina; Swab   Result Value Ref Range    Trichomonas Absent Absent    Yeast Absent Absent    Clue Cells Present (A) Absent    WBCs/high power field 2+ (A) None             "

## 2025-07-03 NOTE — PATIENT INSTRUCTIONS
I am treating you with metronidazole gel, one applicator in the vagina once daily for 7 days.   Using it vaginally will give you less overall absorption into the body, but there is still a chance of reacting with alcohol, so I recommend avoidance or at least minimizing alcohol intake while using the medication and for 24 hours after completing therapy. Even with the vaginal gel it's not recommended to use alcohol.     You MAY continue to use your pyridium for pain IF HELPFUL, but since it's not a bladder infection it's not likely to be beneficial.     We will send in the urine culture and notify you with results. If positive showing infection, you'll need additional treatment for that.     Continue to drink plenty of water to keep well hydrated and flush the kidneys/bladder well.

## 2025-07-05 ENCOUNTER — RESULTS FOLLOW-UP (OUTPATIENT)
Dept: URGENT CARE | Facility: URGENT CARE | Age: 50
End: 2025-07-05

## 2025-07-05 LAB — BACTERIA UR CULT: NORMAL

## 2025-07-17 ENCOUNTER — OFFICE VISIT (OUTPATIENT)
Dept: OBGYN | Facility: CLINIC | Age: 50
End: 2025-07-17
Payer: COMMERCIAL

## 2025-07-17 VITALS
SYSTOLIC BLOOD PRESSURE: 135 MMHG | RESPIRATION RATE: 16 BRPM | HEART RATE: 88 BPM | DIASTOLIC BLOOD PRESSURE: 87 MMHG | BODY MASS INDEX: 22.76 KG/M2 | WEIGHT: 150.2 LBS | HEIGHT: 68 IN | TEMPERATURE: 97.9 F

## 2025-07-17 DIAGNOSIS — N89.8 VAGINAL IRRITATION: ICD-10-CM

## 2025-07-17 DIAGNOSIS — R35.0 URINARY FREQUENCY: ICD-10-CM

## 2025-07-17 DIAGNOSIS — R30.9 URINARY PAIN: Primary | ICD-10-CM

## 2025-07-17 DIAGNOSIS — N30.01 ACUTE CYSTITIS WITH HEMATURIA: ICD-10-CM

## 2025-07-17 DIAGNOSIS — N76.0 BV (BACTERIAL VAGINOSIS): ICD-10-CM

## 2025-07-17 DIAGNOSIS — B96.89 BV (BACTERIAL VAGINOSIS): ICD-10-CM

## 2025-07-17 DIAGNOSIS — B37.31 CANDIDAL VULVITIS: ICD-10-CM

## 2025-07-17 DIAGNOSIS — M54.50 ACUTE BILATERAL LOW BACK PAIN WITHOUT SCIATICA: ICD-10-CM

## 2025-07-17 DIAGNOSIS — N20.0 KIDNEY STONE: ICD-10-CM

## 2025-07-17 LAB
ALBUMIN UR-MCNC: NEGATIVE MG/DL
APPEARANCE UR: ABNORMAL
BACTERIA #/AREA URNS HPF: ABNORMAL /HPF
BILIRUB UR QL STRIP: NEGATIVE
CLUE CELLS: PRESENT
COLOR UR AUTO: YELLOW
GLUCOSE UR STRIP-MCNC: NEGATIVE MG/DL
HGB UR QL STRIP: ABNORMAL
KETONES UR STRIP-MCNC: NEGATIVE MG/DL
LEUKOCYTE ESTERASE UR QL STRIP: ABNORMAL
NITRATE UR QL: POSITIVE
PH UR STRIP: 5.5 [PH] (ref 5–7)
RBC #/AREA URNS AUTO: ABNORMAL /HPF
SP GR UR STRIP: <=1.005 (ref 1–1.03)
SQUAMOUS #/AREA URNS AUTO: ABNORMAL /LPF
TRICHOMONAS, WET PREP: ABNORMAL
UROBILINOGEN UR STRIP-ACNC: 0.2 E.U./DL
WBC #/AREA URNS AUTO: ABNORMAL /HPF
WBC CLUMPS #/AREA URNS HPF: PRESENT /HPF
WBC'S/HIGH POWER FIELD, WET PREP: ABNORMAL
YEAST, WET PREP: ABNORMAL

## 2025-07-17 RX ORDER — FLUCONAZOLE 150 MG/1
TABLET ORAL
Qty: 2 TABLET | Refills: 2 | Status: SHIPPED | OUTPATIENT
Start: 2025-07-17

## 2025-07-17 RX ORDER — METRONIDAZOLE 500 MG/1
500 TABLET ORAL 2 TIMES DAILY
Qty: 14 TABLET | Refills: 0 | Status: SHIPPED | OUTPATIENT
Start: 2025-07-17 | End: 2025-07-24

## 2025-07-17 RX ORDER — SULFAMETHOXAZOLE AND TRIMETHOPRIM 800; 160 MG/1; MG/1
1 TABLET ORAL 2 TIMES DAILY
Qty: 14 TABLET | Refills: 0 | Status: SHIPPED | OUTPATIENT
Start: 2025-07-17 | End: 2025-07-24

## 2025-07-17 NOTE — PROGRESS NOTES
"ESTABLISHED PATIENT OUTPATIENT VISIT    Chief complaint/Reason for visit: Urinary and vaginal concerns    HPI: 50 year old presents with urinary and vaginal concerns.  She reports a history of UTIs \"my whole life.\"  Had a \"bladder stretching\" procedure age 7.  Was on postcoital prevention for awhile but taken off because was starting to get resistant to that.  Has done well with standard prevention measures since.    Current symptoms started around 6/25.  Developed burning with urination, frequency, low back pain.  Did telehealth visit and was given Macrobid x 5 days.  Felt better while on Macrobid but symptoms returned immediately after finishing.  Went to Urgent Care on 7/3, Ucx was negative but was treated for BV with Metrogel.  Didn't feel much better after that treatment.  Started wondering if she had kidney stone so started hydrating.  Reports passing as many as 13 small stones over this past weekend.  Has noticed some blood when wiping after urinating.  Also has developed some vulvar burning/discomfort as if she has cuts.    Currently on OCPs.  Was seen by Estelita De León in 3/2025 for irregular bleeding and pelvic US was ordered which she has not completed.  She did have EmBx that was benign.    Allergies: Cipro (N/V).  ------------------------------------------------------------------------  Review of Systems:     With the exception of any items noted in HPI, the remainder of the GI and  ROS is negative.    ------------------------------------------------------------------------    The medical, surgical, social and family histories were reviewed and updated.     ------------------------------------------------------------------------  Physical Exam:    Constitutional:   - /87 (BP Location: Right arm, Patient Position: Sitting, Cuff Size: Adult Regular)   Pulse 88   Temp 97.9  F (36.6  C)   Resp 16   Ht 1.715 m (5' 7.5\")   Wt 68.1 kg (150 lb 3.2 oz)   LMP 06/24/2025 (Exact Date)   BMI 23.18 " kg/m    - General Appearance: pleasant, well-appearing, NAD    Back: No CVA tenderness    Genitourinary/Female Genitalia:  - EGBUS: Fissures noted in skin folds along labia majora/minora border bilaterally.  Otherwise normal-appearing external genitalia and perineum  - Vagina: normal vaginal mucosa and discharge.  Minimal discharge.  Wet prep sent.  - Cervix: without lesions  - Bladder/Urethra: tender to palpation, no masses  - Uterus/Adnexa: difficult to appreciate as patient was slightly guarding due to reported bladder discomfort.  No uterine or adnexal masses/tenderness noted.    ------------------------------------------------------------------------  Labs/Studies Reviewed:     Component      Latest Ref Rng 7/17/2025  2:32 PM 7/17/2025  2:50 PM   Color Urine      Colorless, Straw, Light Yellow, Yellow  Yellow     Appearance Urine      Clear  Cloudy !     Glucose Urine      Negative mg/dL Negative     Bilirubin Urine      Negative  Negative     Ketones Urine      Negative mg/dL Negative     Specific Gravity Urine      1.003 - 1.035  <=1.005     Blood Urine      Negative  Small !     pH Urine      5.0 - 7.0  5.5     Protein Albumin Urine      Negative mg/dL Negative     Urobilinogen Urine      0.2, 1.0 E.U./dL 0.2     Nitrite Urine      Negative  Positive !     Leukocyte Esterase Urine      Negative  Moderate !     Bacteria Urine      None Seen /HPF Moderate !     RBC Urine      0-2 /HPF /HPF 2-5 !     WBC Urine      0-5 /HPF /HPF 25-50 !     Squamous Epithelial /LPF Urine      None Seen /LPF Few !     WBC Clumps      None Seen /HPF Present !     Trichomonas      Absent   Absent    Yeast      Absent   Absent    Clue cells      Absent   Present !    WBCs/high power field      None   2+ !       Legend:  ! Abnormal    ------------------------------------------------------------------------  Assessment/Plan:  50 year old with ongoing UTI symptoms, possibly passing stones, and vaginal discomfort.  -- Exam shows  evidence of vulvar candidiasis and wet prep shows persistent BV.  Will treat for both.  Rx Diflucan and oral Flagyl sent in per patient preference.  -- UA suggestive of UTI and patient with history of urological procedure with history of frequent UTIs.  History of adverse reaction to Cipro (N/V).  Will presumptively treat with Bactrim DS, 1 tab bid x 7 days.  Follow up culture.  -- Check renal US to evaluate for stones.  Urology referral placed to discuss management of possible stones and recurrent UTIs.  -- Advised completing pelvic US.    Hellen Ontiveros MD

## 2025-07-17 NOTE — PATIENT INSTRUCTIONS
Prescriptions sent for yeast vulvitis (Diflucan) and UTI (Bactrim).  Schedule pelvic ultrasound and renal ultrasound.  Urology referral placed.

## 2025-07-19 LAB — BACTERIA UR CULT: NORMAL

## 2025-07-25 ENCOUNTER — HOSPITAL ENCOUNTER (OUTPATIENT)
Dept: ULTRASOUND IMAGING | Facility: CLINIC | Age: 50
Discharge: HOME | End: 2025-07-25
Attending: PHYSICIAN ASSISTANT | Admitting: PHYSICIAN ASSISTANT
Payer: COMMERCIAL

## 2025-07-25 DIAGNOSIS — N92.4 EXCESSIVE BLEEDING IN PREMENOPAUSAL PERIOD: ICD-10-CM

## 2025-07-25 DIAGNOSIS — M54.50 ACUTE BILATERAL LOW BACK PAIN WITHOUT SCIATICA: ICD-10-CM

## 2025-07-25 DIAGNOSIS — R35.0 URINARY FREQUENCY: ICD-10-CM

## 2025-07-25 PROCEDURE — 76856 US EXAM PELVIC COMPLETE: CPT

## 2025-07-25 PROCEDURE — 76770 US EXAM ABDO BACK WALL COMP: CPT

## (undated) DEVICE — SOL NACL 0.9% IRRIG 3000ML BAG 07972-08

## (undated) DEVICE — SOL NACL 0.9% IRRIG 1000ML BOTTLE 07138-09

## (undated) DEVICE — Device

## (undated) DEVICE — PREP CHLORAPREP 26ML TINTED ORANGE  260815

## (undated) DEVICE — CLIP APPLIER ENDO ROTATING 10MM MED/LG ER320

## (undated) DEVICE — ESU ELEC CLEANCOAT LAP FLAT L-HOOK 36CM E3774-36C

## (undated) DEVICE — STOCKING SLEEVE COMPRESSION CALF LG

## (undated) DEVICE — GOWN XLG DISP 9545

## (undated) DEVICE — ESU PENCIL W/COATED BLADE E2450H

## (undated) DEVICE — SYSTEM LAPAROVUE VISIBILITY LAPVUE10

## (undated) DEVICE — GLOVE PROTEXIS W/NEU-THERA 7.5  2D73TE75

## (undated) DEVICE — SUCTION MANIFOLD NEPTUNE 2 SYS 1 PORT 702-025-000

## (undated) DEVICE — ENDO TROCAR FIRST ENTRY KII FIOS ADV FIX 11X100MM CFF33

## (undated) DEVICE — DRAPE POUCH INSTRUMENT 3 POCKET 1018L

## (undated) DEVICE — DECANTER VIAL 2006S

## (undated) DEVICE — ENDO TROCAR FIRST ENTRY KII FIOS ADV FIX 05X100MM CFF03

## (undated) DEVICE — ENDO POUCH UNIV RETRIEVAL SYSTEM INZII 10MM CD001

## (undated) DEVICE — ENDO TROCAR SLEEVE KII ADV FIXATION 05X100MM CFS02

## (undated) DEVICE — SU VICRYL 0 UR-6 27" J603H

## (undated) DEVICE — SU MONOCRYL 4-0 PS-2 18" UND Y496G

## (undated) DEVICE — ESU HOLSTER PLASTIC DISP E2400

## (undated) DEVICE — GLOVE PROTEXIS BLUE W/NEU-THERA 8.0  2D73EB80

## (undated) DEVICE — SUCTION IRRIGATION STRYKFLOW II W/TIP DISP 250-070-520

## (undated) DEVICE — SURGICEL HEMOSTAT 4X8" 1952

## (undated) DEVICE — ESU ENDO SCISSORS 5MM CVD 5DCS

## (undated) DEVICE — SOL WATER IRRIG 1000ML BOTTLE 07139-09

## (undated) DEVICE — ADH SKIN CLOSURE PREMIERPRO EXOFIN 1.0ML 3470

## (undated) RX ORDER — ONDANSETRON 2 MG/ML
INJECTION INTRAMUSCULAR; INTRAVENOUS
Status: DISPENSED
Start: 2022-11-04

## (undated) RX ORDER — DEXAMETHASONE SODIUM PHOSPHATE 4 MG/ML
INJECTION, SOLUTION INTRA-ARTICULAR; INTRALESIONAL; INTRAMUSCULAR; INTRAVENOUS; SOFT TISSUE
Status: DISPENSED
Start: 2022-11-04

## (undated) RX ORDER — FENTANYL CITRATE-0.9 % NACL/PF 10 MCG/ML
PLASTIC BAG, INJECTION (ML) INTRAVENOUS
Status: DISPENSED
Start: 2022-11-04

## (undated) RX ORDER — BUPIVACAINE HYDROCHLORIDE 5 MG/ML
INJECTION, SOLUTION EPIDURAL; INTRACAUDAL
Status: DISPENSED
Start: 2022-11-04

## (undated) RX ORDER — OXYCODONE HYDROCHLORIDE 5 MG/1
TABLET ORAL
Status: DISPENSED
Start: 2022-11-04

## (undated) RX ORDER — FENTANYL CITRATE 50 UG/ML
INJECTION, SOLUTION INTRAMUSCULAR; INTRAVENOUS
Status: DISPENSED
Start: 2022-11-04

## (undated) RX ORDER — LIDOCAINE HYDROCHLORIDE AND EPINEPHRINE 10; 10 MG/ML; UG/ML
INJECTION, SOLUTION INFILTRATION; PERINEURAL
Status: DISPENSED
Start: 2022-11-04

## (undated) RX ORDER — PROPOFOL 10 MG/ML
INJECTION, EMULSION INTRAVENOUS
Status: DISPENSED
Start: 2022-11-04

## (undated) RX ORDER — ACETAMINOPHEN 325 MG/1
TABLET ORAL
Status: DISPENSED
Start: 2022-11-04

## (undated) RX ORDER — KETOROLAC TROMETHAMINE 30 MG/ML
INJECTION, SOLUTION INTRAMUSCULAR; INTRAVENOUS
Status: DISPENSED
Start: 2022-11-04

## (undated) RX ORDER — CLINDAMYCIN PHOSPHATE 900 MG/50ML
INJECTION, SOLUTION INTRAVENOUS
Status: DISPENSED
Start: 2022-11-04